# Patient Record
Sex: MALE | Race: WHITE | NOT HISPANIC OR LATINO
[De-identification: names, ages, dates, MRNs, and addresses within clinical notes are randomized per-mention and may not be internally consistent; named-entity substitution may affect disease eponyms.]

---

## 2018-04-06 ENCOUNTER — APPOINTMENT (OUTPATIENT)
Dept: DERMATOLOGY | Facility: CLINIC | Age: 83
End: 2018-04-06
Payer: MEDICARE

## 2018-04-06 ENCOUNTER — MOBILE ON CALL (OUTPATIENT)
Age: 83
End: 2018-04-06

## 2018-04-06 ENCOUNTER — LABORATORY RESULT (OUTPATIENT)
Age: 83
End: 2018-04-06

## 2018-04-06 VITALS
WEIGHT: 176 LBS | HEIGHT: 70 IN | DIASTOLIC BLOOD PRESSURE: 68 MMHG | SYSTOLIC BLOOD PRESSURE: 126 MMHG | BODY MASS INDEX: 25.2 KG/M2

## 2018-04-06 DIAGNOSIS — Z86.39 PERSONAL HISTORY OF OTHER ENDOCRINE, NUTRITIONAL AND METABOLIC DISEASE: ICD-10-CM

## 2018-04-06 DIAGNOSIS — D48.5 NEOPLASM OF UNCERTAIN BEHAVIOR OF SKIN: ICD-10-CM

## 2018-04-06 DIAGNOSIS — Z91.89 OTHER SPECIFIED PERSONAL RISK FACTORS, NOT ELSEWHERE CLASSIFIED: ICD-10-CM

## 2018-04-06 DIAGNOSIS — Z87.891 PERSONAL HISTORY OF NICOTINE DEPENDENCE: ICD-10-CM

## 2018-04-06 PROCEDURE — 99203 OFFICE O/P NEW LOW 30 MIN: CPT | Mod: 25,GC

## 2018-04-06 PROCEDURE — 11100 BX SKIN SUBCUTANEOUS&/MUCOUS MEMBRANE 1 LESION: CPT | Mod: 59,GC

## 2018-04-06 PROCEDURE — 11101 BIOPSY SKIN SUBQ&/MUCOUS MEMBRANE EA ADDL LESN: CPT | Mod: 59,GC

## 2018-04-06 RX ORDER — GLIPIZIDE 5 MG/1
5 TABLET, FILM COATED, EXTENDED RELEASE ORAL
Qty: 90 | Refills: 0 | Status: ACTIVE | COMMUNITY
Start: 2017-11-02

## 2018-04-06 RX ORDER — OSELTAMIVIR PHOSPHATE 75 MG/1
75 CAPSULE ORAL
Qty: 10 | Refills: 0 | Status: ACTIVE | COMMUNITY
Start: 2018-01-09

## 2018-04-06 RX ORDER — MIRTAZAPINE 15 MG/1
15 TABLET, FILM COATED ORAL
Qty: 90 | Refills: 0 | Status: ACTIVE | COMMUNITY
Start: 2017-08-08

## 2018-04-06 RX ORDER — ESCITALOPRAM OXALATE 10 MG/1
10 TABLET ORAL
Qty: 45 | Refills: 0 | Status: ACTIVE | COMMUNITY
Start: 2017-05-16

## 2018-04-06 RX ORDER — CLONAZEPAM 0.5 MG/1
0.5 TABLET ORAL
Qty: 30 | Refills: 0 | Status: ACTIVE | COMMUNITY
Start: 2017-12-12

## 2018-04-06 RX ORDER — METFORMIN HYDROCHLORIDE 1000 MG/1
1000 TABLET, COATED ORAL
Qty: 90 | Refills: 0 | Status: ACTIVE | COMMUNITY
Start: 2017-11-02

## 2018-04-06 RX ORDER — POLYETHYLENE GLYCOL 3350 17 G/17G
17 POWDER, FOR SOLUTION ORAL
Qty: 510 | Refills: 0 | Status: ACTIVE | COMMUNITY
Start: 2017-11-20

## 2018-04-10 ENCOUNTER — TRANSCRIPTION ENCOUNTER (OUTPATIENT)
Age: 83
End: 2018-04-10

## 2018-04-17 ENCOUNTER — APPOINTMENT (OUTPATIENT)
Dept: PLASTIC SURGERY | Facility: CLINIC | Age: 83
End: 2018-04-17
Payer: MEDICARE

## 2018-04-17 ENCOUNTER — MOBILE ON CALL (OUTPATIENT)
Age: 83
End: 2018-04-17

## 2018-04-17 ENCOUNTER — APPOINTMENT (OUTPATIENT)
Dept: DERMATOLOGY | Facility: CLINIC | Age: 83
End: 2018-04-17
Payer: MEDICARE

## 2018-04-17 ENCOUNTER — LABORATORY RESULT (OUTPATIENT)
Age: 83
End: 2018-04-17

## 2018-04-17 VITALS
HEIGHT: 70 IN | TEMPERATURE: 98.2 F | DIASTOLIC BLOOD PRESSURE: 71 MMHG | SYSTOLIC BLOOD PRESSURE: 123 MMHG | BODY MASS INDEX: 24.62 KG/M2 | HEART RATE: 89 BPM | WEIGHT: 172 LBS

## 2018-04-17 PROCEDURE — 99203 OFFICE O/P NEW LOW 30 MIN: CPT

## 2018-04-17 PROCEDURE — 17263 DSTRJ MAL LES T/A/L 2.1-3.0: CPT

## 2018-04-17 PROCEDURE — 11100 BX SKIN SUBCUTANEOUS&/MUCOUS MEMBRANE 1 LESION: CPT | Mod: 59

## 2018-04-17 PROCEDURE — 99214 OFFICE O/P EST MOD 30 MIN: CPT | Mod: 25

## 2018-04-17 RX ORDER — MUPIROCIN 20 MG/G
2 OINTMENT TOPICAL
Qty: 1 | Refills: 6 | Status: ACTIVE | COMMUNITY
Start: 2018-04-17 | End: 1900-01-01

## 2018-04-17 RX ORDER — MUPIROCIN CALCIUM 20 MG/G
2 OINTMENT TOPICAL
Qty: 1 | Refills: 2 | Status: DISCONTINUED | COMMUNITY
Start: 2018-04-17 | End: 2018-04-17

## 2018-04-30 ENCOUNTER — MOBILE ON CALL (OUTPATIENT)
Age: 83
End: 2018-04-30

## 2018-04-30 ENCOUNTER — APPOINTMENT (OUTPATIENT)
Dept: DERMATOLOGY | Facility: CLINIC | Age: 83
End: 2018-04-30
Payer: MEDICARE

## 2018-04-30 DIAGNOSIS — C44.329 SQUAMOUS CELL CARCINOMA OF SKIN OF OTHER PARTS OF FACE: ICD-10-CM

## 2018-04-30 PROCEDURE — 99213 OFFICE O/P EST LOW 20 MIN: CPT | Mod: 25

## 2018-04-30 PROCEDURE — 17311 MOHS 1 STAGE H/N/HF/G: CPT

## 2018-05-01 ENCOUNTER — OUTPATIENT (OUTPATIENT)
Dept: OUTPATIENT SERVICES | Facility: HOSPITAL | Age: 83
LOS: 1 days | End: 2018-05-01

## 2018-05-01 ENCOUNTER — APPOINTMENT (OUTPATIENT)
Dept: PLASTIC SURGERY | Facility: CLINIC | Age: 83
End: 2018-05-01
Payer: MEDICARE

## 2018-05-01 VITALS
TEMPERATURE: 99 F | DIASTOLIC BLOOD PRESSURE: 60 MMHG | WEIGHT: 173.94 LBS | SYSTOLIC BLOOD PRESSURE: 100 MMHG | HEIGHT: 68.5 IN | RESPIRATION RATE: 16 BRPM | HEART RATE: 72 BPM

## 2018-05-01 DIAGNOSIS — E11.9 TYPE 2 DIABETES MELLITUS WITHOUT COMPLICATIONS: ICD-10-CM

## 2018-05-01 DIAGNOSIS — Z85.828 PERSONAL HISTORY OF OTHER MALIGNANT NEOPLASM OF SKIN: Chronic | ICD-10-CM

## 2018-05-01 DIAGNOSIS — C44.519 BASAL CELL CARCINOMA OF SKIN OF OTHER PART OF TRUNK: ICD-10-CM

## 2018-05-01 DIAGNOSIS — C44.91 BASAL CELL CARCINOMA OF SKIN, UNSPECIFIED: ICD-10-CM

## 2018-05-01 LAB
ALBUMIN SERPL ELPH-MCNC: 3.8 G/DL — SIGNIFICANT CHANGE UP (ref 3.3–5)
ALP SERPL-CCNC: 93 U/L — SIGNIFICANT CHANGE UP (ref 40–120)
ALT FLD-CCNC: 18 U/L — SIGNIFICANT CHANGE UP (ref 4–41)
AST SERPL-CCNC: 21 U/L — SIGNIFICANT CHANGE UP (ref 4–40)
BILIRUB SERPL-MCNC: 0.3 MG/DL — SIGNIFICANT CHANGE UP (ref 0.2–1.2)
BLD GP AB SCN SERPL QL: NEGATIVE — SIGNIFICANT CHANGE UP
BUN SERPL-MCNC: 17 MG/DL — SIGNIFICANT CHANGE UP (ref 7–23)
CALCIUM SERPL-MCNC: 9.6 MG/DL — SIGNIFICANT CHANGE UP (ref 8.4–10.5)
CHLORIDE SERPL-SCNC: 98 MMOL/L — SIGNIFICANT CHANGE UP (ref 98–107)
CO2 SERPL-SCNC: 33 MMOL/L — HIGH (ref 22–31)
CREAT SERPL-MCNC: 1.39 MG/DL — HIGH (ref 0.5–1.3)
GLUCOSE SERPL-MCNC: 116 MG/DL — HIGH (ref 70–99)
HBA1C BLD-MCNC: 5.8 % — HIGH (ref 4–5.6)
HCT VFR BLD CALC: 44.1 % — SIGNIFICANT CHANGE UP (ref 39–50)
HGB BLD-MCNC: 13.7 G/DL — SIGNIFICANT CHANGE UP (ref 13–17)
MCHC RBC-ENTMCNC: 31.1 % — LOW (ref 32–36)
MCHC RBC-ENTMCNC: 32.2 PG — SIGNIFICANT CHANGE UP (ref 27–34)
MCV RBC AUTO: 103.5 FL — HIGH (ref 80–100)
NRBC # FLD: 0 — SIGNIFICANT CHANGE UP
PLATELET # BLD AUTO: 234 K/UL — SIGNIFICANT CHANGE UP (ref 150–400)
PMV BLD: 9.4 FL — SIGNIFICANT CHANGE UP (ref 7–13)
POTASSIUM SERPL-MCNC: 4.6 MMOL/L — SIGNIFICANT CHANGE UP (ref 3.5–5.3)
POTASSIUM SERPL-SCNC: 4.6 MMOL/L — SIGNIFICANT CHANGE UP (ref 3.5–5.3)
PROT SERPL-MCNC: 7.4 G/DL — SIGNIFICANT CHANGE UP (ref 6–8.3)
RBC # BLD: 4.26 M/UL — SIGNIFICANT CHANGE UP (ref 4.2–5.8)
RBC # FLD: 13 % — SIGNIFICANT CHANGE UP (ref 10.3–14.5)
RH IG SCN BLD-IMP: POSITIVE — SIGNIFICANT CHANGE UP
SODIUM SERPL-SCNC: 140 MMOL/L — SIGNIFICANT CHANGE UP (ref 135–145)
WBC # BLD: 9.82 K/UL — SIGNIFICANT CHANGE UP (ref 3.8–10.5)
WBC # FLD AUTO: 9.82 K/UL — SIGNIFICANT CHANGE UP (ref 3.8–10.5)

## 2018-05-01 PROCEDURE — 14041 TIS TRNFR F/C/C/M/N/A/G/H/F: CPT

## 2018-05-01 NOTE — H&P PST ADULT - NEGATIVE ENMT SYMPTOMS
no sinus symptoms/no tinnitus/no nose bleeds/no vertigo/no throat pain/no dysphagia/no ear pain/no hearing difficulty

## 2018-05-01 NOTE — H&P PST ADULT - SKIN COMMENTS
dry scaly skin to lower extremities, right cheek/under eye sutures c/d/i, left anterior shoulder dressing c/d/i

## 2018-05-01 NOTE — H&P PST ADULT - PROBLEM SELECTOR PLAN 2
type 2, instructed to hold metformin and Glipizide on the morning of procedure. OR booking notified of Hx of DM type 2.

## 2018-05-01 NOTE — H&P PST ADULT - RS GEN PE MLT RESP DETAILS PC
no chest wall tenderness/clear to auscultation bilaterally/good air movement/respirations non-labored/no rhonchi/airway patent/no wheezes/no rales/breath sounds equal

## 2018-05-01 NOTE — H&P PST ADULT - PROBLEM SELECTOR PLAN 1
Pt is scheduled for full thickness graft to chest for 5/7/18. Preop instructions, pepcid provided. Pt and daughter stated understanding. Penicillin allergy, OR booking notified. Pending medical evaluation due to Hx of peripheral edema, and DM type 2. Instructed patient to obtain medical evaluation. Pending echo report 2018. Instructed to take escitalopram on the morning of procedure

## 2018-05-01 NOTE — H&P PST ADULT - NSANTHOSAYNRD_GEN_A_CORE
No. VELMA screening performed.  STOP BANG Legend: 0-2 = LOW Risk; 3-4 = INTERMEDIATE Risk; 5-8 = HIGH Risk

## 2018-05-01 NOTE — H&P PST ADULT - PMH
Anxiety    Basal cell carcinoma of skin    Depression    Diabetes mellitus  type 2  Hyperlipidemia    Pancreatitis  years ago  Squamous cell carcinoma

## 2018-05-01 NOTE — H&P PST ADULT - MUSCULOSKELETAL
details… detailed exam no calf tenderness/no joint warmth/normal strength/no joint swelling/no joint erythema/ROM intact

## 2018-05-01 NOTE — H&P PST ADULT - NEGATIVE GENERAL SYMPTOMS
no polydipsia/no fever/no sweating/no anorexia/no malaise/no chills/no weight loss/no weight gain/no polyphagia/no polyuria/no fatigue

## 2018-05-01 NOTE — H&P PST ADULT - HISTORY OF PRESENT ILLNESS
85 year old male presents to presurgical testing with diagnosis of basal cell carcinoma of skin of other part of trunk scheduled for full thickness graft to chest for 5/7/18. Pt with Hx of Moh's procedure to right medial cheek on 4/30/18. Pt left anterior shoulder lesion, increasing in size s/p biopsy, requiring further surgical intervention. 85 year old male presents to presurgical testing with diagnosis of basal cell carcinoma of skin of other part of trunk scheduled for full thickness graft to chest for 5/7/18. Pt with Hx of Moh's procedure to right medial cheek on 4/30/18. Pt with left anterior shoulder lesion, increasing in size s/p biopsy, requiring further surgical intervention.

## 2018-05-01 NOTE — H&P PST ADULT - NEGATIVE OPHTHALMOLOGIC SYMPTOMS
no diplopia/no blurred vision R/no discharge R/no pain L/no loss of vision R/no discharge L/no pain R/no loss of vision L/no blurred vision L

## 2018-05-01 NOTE — H&P PST ADULT - ENDOCRINE COMMENTS
DM type 2 on medication, reports "blood work was normal", does not check FS on a daily basis. Denies thyroid dysfunction

## 2018-05-01 NOTE — H&P PST ADULT - NEGATIVE NEUROLOGICAL SYMPTOMS
no syncope/no focal seizures/no confusion/no loss of consciousness/no hemiparesis/no headache/no transient paralysis/no paresthesias/no weakness/no generalized seizures/no loss of sensation

## 2018-05-07 ENCOUNTER — APPOINTMENT (OUTPATIENT)
Dept: PLASTIC SURGERY | Facility: CLINIC | Age: 83
End: 2018-05-07
Payer: MEDICARE

## 2018-05-07 ENCOUNTER — APPOINTMENT (OUTPATIENT)
Dept: DERMATOLOGY | Facility: CLINIC | Age: 83
End: 2018-05-07
Payer: MEDICARE

## 2018-05-07 ENCOUNTER — MOBILE ON CALL (OUTPATIENT)
Age: 83
End: 2018-05-07

## 2018-05-07 PROCEDURE — 17314 MOHS ADDL STAGE T/A/L: CPT

## 2018-05-07 PROCEDURE — 17313 MOHS 1 STAGE T/A/L: CPT

## 2018-05-07 PROCEDURE — 17315 MOHS SURG ADDL BLOCK: CPT

## 2018-05-07 PROCEDURE — 13101 CMPLX RPR TRUNK 2.6-7.5 CM: CPT | Mod: 79

## 2018-05-14 ENCOUNTER — APPOINTMENT (OUTPATIENT)
Dept: DERMATOLOGY | Facility: CLINIC | Age: 83
End: 2018-05-14

## 2018-05-15 ENCOUNTER — APPOINTMENT (OUTPATIENT)
Dept: PLASTIC SURGERY | Facility: CLINIC | Age: 83
End: 2018-05-15
Payer: MEDICARE

## 2018-05-15 PROCEDURE — 99024 POSTOP FOLLOW-UP VISIT: CPT

## 2018-05-29 ENCOUNTER — APPOINTMENT (OUTPATIENT)
Dept: PLASTIC SURGERY | Facility: CLINIC | Age: 83
End: 2018-05-29
Payer: MEDICARE

## 2018-05-29 PROCEDURE — 99024 POSTOP FOLLOW-UP VISIT: CPT

## 2018-06-19 ENCOUNTER — APPOINTMENT (OUTPATIENT)
Dept: PLASTIC SURGERY | Facility: CLINIC | Age: 83
End: 2018-06-19
Payer: MEDICARE

## 2018-06-19 PROCEDURE — 99024 POSTOP FOLLOW-UP VISIT: CPT

## 2018-07-24 ENCOUNTER — APPOINTMENT (OUTPATIENT)
Dept: PLASTIC SURGERY | Facility: CLINIC | Age: 83
End: 2018-07-24
Payer: MEDICARE

## 2018-07-24 ENCOUNTER — NON-APPOINTMENT (OUTPATIENT)
Age: 83
End: 2018-07-24

## 2018-07-24 ENCOUNTER — APPOINTMENT (OUTPATIENT)
Dept: DERMATOLOGY | Facility: CLINIC | Age: 83
End: 2018-07-24
Payer: MEDICARE

## 2018-07-24 DIAGNOSIS — C44.91 BASAL CELL CARCINOMA OF SKIN, UNSPECIFIED: ICD-10-CM

## 2018-07-24 DIAGNOSIS — C44.519 BASAL CELL CARCINOMA OF SKIN OF OTHER PART OF TRUNK: ICD-10-CM

## 2018-07-24 PROCEDURE — 17311 MOHS 1 STAGE H/N/HF/G: CPT

## 2018-07-24 PROCEDURE — 99024 POSTOP FOLLOW-UP VISIT: CPT

## 2018-07-24 PROCEDURE — 13132 CMPLX RPR F/C/C/M/N/AX/G/H/F: CPT

## 2018-11-21 ENCOUNTER — APPOINTMENT (OUTPATIENT)
Dept: CT IMAGING | Facility: CLINIC | Age: 83
End: 2018-11-21
Payer: MEDICARE

## 2018-11-21 ENCOUNTER — OUTPATIENT (OUTPATIENT)
Dept: OUTPATIENT SERVICES | Facility: HOSPITAL | Age: 83
LOS: 1 days | End: 2018-11-21
Payer: MEDICARE

## 2018-11-21 ENCOUNTER — TRANSCRIPTION ENCOUNTER (OUTPATIENT)
Age: 83
End: 2018-11-21

## 2018-11-21 DIAGNOSIS — Z85.828 PERSONAL HISTORY OF OTHER MALIGNANT NEOPLASM OF SKIN: Chronic | ICD-10-CM

## 2018-11-21 DIAGNOSIS — S09.90XA UNSPECIFIED INJURY OF HEAD, INITIAL ENCOUNTER: ICD-10-CM

## 2018-11-21 PROBLEM — C44.92 SQUAMOUS CELL CARCINOMA OF SKIN, UNSPECIFIED: Chronic | Status: ACTIVE | Noted: 2018-05-01

## 2018-11-21 PROBLEM — C44.91 BASAL CELL CARCINOMA OF SKIN, UNSPECIFIED: Chronic | Status: ACTIVE | Noted: 2018-05-01

## 2018-11-21 PROCEDURE — 70450 CT HEAD/BRAIN W/O DYE: CPT | Mod: 26

## 2018-11-21 PROCEDURE — 70450 CT HEAD/BRAIN W/O DYE: CPT

## 2018-12-05 ENCOUNTER — APPOINTMENT (OUTPATIENT)
Dept: OPHTHALMOLOGY | Facility: CLINIC | Age: 83
End: 2018-12-05
Payer: MEDICARE

## 2018-12-05 DIAGNOSIS — E11.3293 TYPE 2 DIABETES MELLITUS WITH MILD NONPROLIFERATIVE DIABETIC RETINOPATHY WITHOUT MACULAR EDEMA, BILATERAL: ICD-10-CM

## 2018-12-05 DIAGNOSIS — H40.003 PREGLAUCOMA, UNSPECIFIED, BILATERAL: ICD-10-CM

## 2018-12-05 PROCEDURE — 92133 CPTRZD OPH DX IMG PST SGM ON: CPT

## 2018-12-05 PROCEDURE — 92225: CPT | Mod: 50

## 2018-12-05 PROCEDURE — 92004 COMPRE OPH EXAM NEW PT 1/>: CPT

## 2018-12-05 PROCEDURE — 76514 ECHO EXAM OF EYE THICKNESS: CPT

## 2019-01-23 ENCOUNTER — EMERGENCY (EMERGENCY)
Facility: HOSPITAL | Age: 84
LOS: 1 days | Discharge: HOME | End: 2019-01-23
Attending: EMERGENCY MEDICINE
Payer: MEDICARE

## 2019-01-23 VITALS
RESPIRATION RATE: 19 BRPM | DIASTOLIC BLOOD PRESSURE: 77 MMHG | TEMPERATURE: 98 F | HEIGHT: 68 IN | HEART RATE: 98 BPM | OXYGEN SATURATION: 95 % | SYSTOLIC BLOOD PRESSURE: 116 MMHG | WEIGHT: 171.96 LBS

## 2019-01-23 VITALS
DIASTOLIC BLOOD PRESSURE: 74 MMHG | RESPIRATION RATE: 18 BRPM | HEART RATE: 88 BPM | SYSTOLIC BLOOD PRESSURE: 149 MMHG | TEMPERATURE: 98 F | OXYGEN SATURATION: 95 %

## 2019-01-23 DIAGNOSIS — Z85.828 PERSONAL HISTORY OF OTHER MALIGNANT NEOPLASM OF SKIN: Chronic | ICD-10-CM

## 2019-01-23 LAB
ALBUMIN SERPL ELPH-MCNC: 3.8 G/DL — SIGNIFICANT CHANGE UP (ref 3.3–5)
ALP SERPL-CCNC: 95 U/L — SIGNIFICANT CHANGE UP (ref 40–120)
ALT FLD-CCNC: 19 U/L — SIGNIFICANT CHANGE UP (ref 10–45)
ANION GAP SERPL CALC-SCNC: 11 MMOL/L — SIGNIFICANT CHANGE UP (ref 5–17)
APTT BLD: 32.1 SEC — SIGNIFICANT CHANGE UP (ref 27.5–36.3)
AST SERPL-CCNC: 20 U/L — SIGNIFICANT CHANGE UP (ref 10–40)
BASOPHILS # BLD AUTO: 0 K/UL — SIGNIFICANT CHANGE UP (ref 0–0.2)
BASOPHILS NFR BLD AUTO: 0.5 % — SIGNIFICANT CHANGE UP (ref 0–2)
BILIRUB SERPL-MCNC: 0.3 MG/DL — SIGNIFICANT CHANGE UP (ref 0.2–1.2)
BUN SERPL-MCNC: 19 MG/DL — SIGNIFICANT CHANGE UP (ref 7–23)
CALCIUM SERPL-MCNC: 9.6 MG/DL — SIGNIFICANT CHANGE UP (ref 8.4–10.5)
CHLORIDE SERPL-SCNC: 99 MMOL/L — SIGNIFICANT CHANGE UP (ref 96–108)
CO2 SERPL-SCNC: 32 MMOL/L — HIGH (ref 22–31)
CREAT SERPL-MCNC: 1.41 MG/DL — HIGH (ref 0.5–1.3)
EOSINOPHIL # BLD AUTO: 0.1 K/UL — SIGNIFICANT CHANGE UP (ref 0–0.5)
EOSINOPHIL NFR BLD AUTO: 1.9 % — SIGNIFICANT CHANGE UP (ref 0–6)
GAS PNL BLDV: SIGNIFICANT CHANGE UP
GLUCOSE SERPL-MCNC: 143 MG/DL — HIGH (ref 70–99)
HCT VFR BLD CALC: 41.6 % — SIGNIFICANT CHANGE UP (ref 39–50)
HGB BLD-MCNC: 13.4 G/DL — SIGNIFICANT CHANGE UP (ref 13–17)
INR BLD: 1.18 RATIO — HIGH (ref 0.88–1.16)
LYMPHOCYTES # BLD AUTO: 1.4 K/UL — SIGNIFICANT CHANGE UP (ref 1–3.3)
LYMPHOCYTES # BLD AUTO: 17.1 % — SIGNIFICANT CHANGE UP (ref 13–44)
MCHC RBC-ENTMCNC: 32.2 GM/DL — SIGNIFICANT CHANGE UP (ref 32–36)
MCHC RBC-ENTMCNC: 32.4 PG — SIGNIFICANT CHANGE UP (ref 27–34)
MCV RBC AUTO: 101 FL — HIGH (ref 80–100)
MONOCYTES # BLD AUTO: 0.6 K/UL — SIGNIFICANT CHANGE UP (ref 0–0.9)
MONOCYTES NFR BLD AUTO: 7.6 % — SIGNIFICANT CHANGE UP (ref 2–14)
NEUTROPHILS # BLD AUTO: 5.8 K/UL — SIGNIFICANT CHANGE UP (ref 1.8–7.4)
NEUTROPHILS NFR BLD AUTO: 72.9 % — SIGNIFICANT CHANGE UP (ref 43–77)
PLATELET # BLD AUTO: 210 K/UL — SIGNIFICANT CHANGE UP (ref 150–400)
POTASSIUM SERPL-MCNC: 4.8 MMOL/L — SIGNIFICANT CHANGE UP (ref 3.5–5.3)
POTASSIUM SERPL-SCNC: 4.8 MMOL/L — SIGNIFICANT CHANGE UP (ref 3.5–5.3)
PROT SERPL-MCNC: 7.2 G/DL — SIGNIFICANT CHANGE UP (ref 6–8.3)
PROTHROM AB SERPL-ACNC: 13.5 SEC — HIGH (ref 10–12.9)
RBC # BLD: 4.13 M/UL — LOW (ref 4.2–5.8)
RBC # FLD: 13 % — SIGNIFICANT CHANGE UP (ref 10.3–14.5)
SODIUM SERPL-SCNC: 142 MMOL/L — SIGNIFICANT CHANGE UP (ref 135–145)
TROPONIN T, HIGH SENSITIVITY RESULT: 48 NG/L — SIGNIFICANT CHANGE UP (ref 0–51)
TROPONIN T, HIGH SENSITIVITY RESULT: 49 NG/L — SIGNIFICANT CHANGE UP (ref 0–51)
WBC # BLD: 7.9 K/UL — SIGNIFICANT CHANGE UP (ref 3.8–10.5)
WBC # FLD AUTO: 7.9 K/UL — SIGNIFICANT CHANGE UP (ref 3.8–10.5)

## 2019-01-23 PROCEDURE — 72125 CT NECK SPINE W/O DYE: CPT | Mod: 26

## 2019-01-23 PROCEDURE — 72100 X-RAY EXAM L-S SPINE 2/3 VWS: CPT

## 2019-01-23 PROCEDURE — 73130 X-RAY EXAM OF HAND: CPT

## 2019-01-23 PROCEDURE — 70450 CT HEAD/BRAIN W/O DYE: CPT | Mod: 26

## 2019-01-23 PROCEDURE — 83605 ASSAY OF LACTIC ACID: CPT

## 2019-01-23 PROCEDURE — 99284 EMERGENCY DEPT VISIT MOD MDM: CPT | Mod: 25

## 2019-01-23 PROCEDURE — 82435 ASSAY OF BLOOD CHLORIDE: CPT

## 2019-01-23 PROCEDURE — 73110 X-RAY EXAM OF WRIST: CPT

## 2019-01-23 PROCEDURE — 73130 X-RAY EXAM OF HAND: CPT | Mod: 26,LT

## 2019-01-23 PROCEDURE — 82550 ASSAY OF CK (CPK): CPT

## 2019-01-23 PROCEDURE — 99285 EMERGENCY DEPT VISIT HI MDM: CPT | Mod: GC,25

## 2019-01-23 PROCEDURE — 96360 HYDRATION IV INFUSION INIT: CPT

## 2019-01-23 PROCEDURE — 82803 BLOOD GASES ANY COMBINATION: CPT

## 2019-01-23 PROCEDURE — 85014 HEMATOCRIT: CPT

## 2019-01-23 PROCEDURE — 90715 TDAP VACCINE 7 YRS/> IM: CPT

## 2019-01-23 PROCEDURE — 93005 ELECTROCARDIOGRAM TRACING: CPT

## 2019-01-23 PROCEDURE — 82962 GLUCOSE BLOOD TEST: CPT

## 2019-01-23 PROCEDURE — 93010 ELECTROCARDIOGRAM REPORT: CPT | Mod: GC

## 2019-01-23 PROCEDURE — 85027 COMPLETE CBC AUTOMATED: CPT

## 2019-01-23 PROCEDURE — 82947 ASSAY GLUCOSE BLOOD QUANT: CPT

## 2019-01-23 PROCEDURE — 80053 COMPREHEN METABOLIC PANEL: CPT

## 2019-01-23 PROCEDURE — 84484 ASSAY OF TROPONIN QUANT: CPT

## 2019-01-23 PROCEDURE — 72100 X-RAY EXAM L-S SPINE 2/3 VWS: CPT | Mod: 26

## 2019-01-23 PROCEDURE — 83880 ASSAY OF NATRIURETIC PEPTIDE: CPT

## 2019-01-23 PROCEDURE — 72125 CT NECK SPINE W/O DYE: CPT

## 2019-01-23 PROCEDURE — 90471 IMMUNIZATION ADMIN: CPT

## 2019-01-23 PROCEDURE — 85610 PROTHROMBIN TIME: CPT

## 2019-01-23 PROCEDURE — 71045 X-RAY EXAM CHEST 1 VIEW: CPT | Mod: 26

## 2019-01-23 PROCEDURE — 84132 ASSAY OF SERUM POTASSIUM: CPT

## 2019-01-23 PROCEDURE — 71045 X-RAY EXAM CHEST 1 VIEW: CPT

## 2019-01-23 PROCEDURE — 84295 ASSAY OF SERUM SODIUM: CPT

## 2019-01-23 PROCEDURE — 82330 ASSAY OF CALCIUM: CPT

## 2019-01-23 PROCEDURE — 70450 CT HEAD/BRAIN W/O DYE: CPT

## 2019-01-23 PROCEDURE — 85730 THROMBOPLASTIN TIME PARTIAL: CPT

## 2019-01-23 PROCEDURE — 73110 X-RAY EXAM OF WRIST: CPT | Mod: 26,LT

## 2019-01-23 RX ORDER — SODIUM CHLORIDE 9 MG/ML
500 INJECTION INTRAMUSCULAR; INTRAVENOUS; SUBCUTANEOUS ONCE
Qty: 0 | Refills: 0 | Status: COMPLETED | OUTPATIENT
Start: 2019-01-23 | End: 2019-01-23

## 2019-01-23 RX ORDER — TETANUS TOXOID, REDUCED DIPHTHERIA TOXOID AND ACELLULAR PERTUSSIS VACCINE, ADSORBED 5; 2.5; 8; 8; 2.5 [IU]/.5ML; [IU]/.5ML; UG/.5ML; UG/.5ML; UG/.5ML
0.5 SUSPENSION INTRAMUSCULAR ONCE
Qty: 0 | Refills: 0 | Status: COMPLETED | OUTPATIENT
Start: 2019-01-23 | End: 2019-01-23

## 2019-01-23 RX ADMIN — SODIUM CHLORIDE 500 MILLILITER(S): 9 INJECTION INTRAMUSCULAR; INTRAVENOUS; SUBCUTANEOUS at 11:00

## 2019-01-23 RX ADMIN — SODIUM CHLORIDE 500 MILLILITER(S): 9 INJECTION INTRAMUSCULAR; INTRAVENOUS; SUBCUTANEOUS at 09:51

## 2019-01-23 RX ADMIN — TETANUS TOXOID, REDUCED DIPHTHERIA TOXOID AND ACELLULAR PERTUSSIS VACCINE, ADSORBED 0.5 MILLILITER(S): 5; 2.5; 8; 8; 2.5 SUSPENSION INTRAMUSCULAR at 09:52

## 2019-01-23 NOTE — ED ADULT NURSE NOTE - OBJECTIVE STATEMENT
0824 86 yr old WM brought to ER via ambulance on stretcher from assisted living facility for further eval and tx of near syncope, fall and weakness at 630 AM. Was in room getting dressed, getting ready for breakfast. Felt weak, fell, striking head and scraping left hand near index finger.Abrasion to right forehead and temporal region. Pressed life alert bracelet. Denies LOC, chest pain, palp or dizziness. Lips and tongue look dry A&Ox4. Color pink. skin W&D. Crackles 1/3 up on right. Pedal edema on right 3+, 1+ on left.  PMHx of diabetes, depression and anxiety

## 2019-01-23 NOTE — ED ADULT NURSE NOTE - NSIMPLEMENTINTERV_GEN_ALL_ED
Implemented All Fall with Harm Risk Interventions:  [place] to call system. Call bell, personal items and telephone within reach. Instruct patient to call for assistance. Room bathroom lighting operational. Non-slip footwear when patient is off stretcher. Physically safe environment: no spills, clutter or unnecessary equipment. Stretcher in lowest position, wheels locked, appropriate side rails in place. Provide visual cue, wrist band, yellow gown, etc. Monitor gait and stability. Monitor for mental status changes and reorient to person, place, and time. Review medications for side effects contributing to fall risk. Reinforce activity limits and safety measures with patient and family. Provide visual clues: red socks.

## 2019-01-23 NOTE — ED ADULT TRIAGE NOTE - CHIEF COMPLAINT QUOTE
fall/ near syncope- pt states that he felt dizzy and fell- pt denies LOC- no anti coags   +abrasion on right side of forehead

## 2019-01-23 NOTE — ED PROVIDER NOTE - PSH
H/O Moh's micrographic surgery for skin cancer  4/30/18 right cheek  Inguinal hernia s/p repair (L)    Renal cyst surgery in 2008

## 2019-01-23 NOTE — ED PROVIDER NOTE - NSFOLLOWUPINSTRUCTIONS_ED_ALL_ED_FT
Please followup with Dr. Daniel tomorrow in the [facility].  Return to ED with any worsening sign or symptoms or any concerns.

## 2019-01-23 NOTE — ED PROVIDER NOTE - PROGRESS NOTE DETAILS
All labs reviewed and discussed with patient and son.  Patient able to stand without difficulty and walk.  Patient will followup with Dr. Daniel tomorrow at the [facility].

## 2019-01-23 NOTE — ED PROVIDER NOTE - CARE PLAN
Principal Discharge DX:	Fall, initial encounter Principal Discharge DX:	Contusion of forehead, initial encounter  Secondary Diagnosis:	Fall, initial encounter

## 2019-01-23 NOTE — ED PROVIDER NOTE - OBJECTIVE STATEMENT
86M PMH NIKOLE SR presents from [facility] with syncope.  Patient reports he woke up at 6:30am and reports he felt dizziness.  Patient reports he fell down and hit his head against the door and rug.  Patient states he does not believe he passed out but does not recall.  Patient states he felt too weak to stand up and activated his life alert.  EMS arrived 20-30 minutes later 86M PMH NIKOLE SR presents from [facility] with dizziness and fall.  Patient reports he woke up at 6:30am and reports he felt dizziness.  Patient describes dizziness as lightheadedness and states he felt like he was going to pass out.  Patient reports he fell down and hit his head against the door and rug.  Patient states he does not believe he passed out but does not recall.  Patient states he felt too weak to stand up and activated his life alert.  EMS arrived 20-30 minutes later per patient.  Patient reports he fell forward but reports mild low back pain and left hand pain.  Patient denies chest pain, dyspnea, nausea, vomiting, changes in vision, numbness, paresthesias. 86M PMH NIKOLE SR presents from [facility] with dizziness and fall.  Patient reports he woke up at 6:30am and reports he felt dizziness.  Patient describes dizziness as lightheadedness and states he felt like he was going to pass out.  Patient reports he fell down and hit his head against the door and rug.  Patient states he does not believe he passed out but does not recall.  Patient states he felt too weak to stand up.  Patient reports he fell forward but reports mild low back pain and left hand pain.  Patient denies chest pain, dyspnea, nausea, vomiting, changes in vision, numbness, paresthesias. 86M PMH NIKOLE SR presents from [facility] with dizziness and fall.  Patient reports he woke up at 6:30am and reports he felt dizziness.  Patient describes dizziness as lightheadedness and states he felt like he was going to pass out.  Patient reports he fell down and hit his head against the door and rug.  Patient states he does not believe he passed out but does not recall.  Patient states he felt too weak to stand up.  Patient reports he fell forward but reports mild low back pain and left hand pain.  Patient denies chest pain, dyspnea, nausea, vomiting, changes in vision, numbness, paresthesias.    Rdz; Dizzy this morning and had fall against wall.  minor contusion to hand and forehead.

## 2019-01-23 NOTE — ED PROVIDER NOTE - PHYSICAL EXAMINATION
Rdz:  General: No distress.  Mentation at baseline. abrasion to forhead.  Exam with son at bedside  HEENT: WNL  Chest/Lungs: CTAB, No wheeze, No retractions, No increased work of breathing, Normal rate  Heart: S1S2 RRR, No M/R/G, Pules equal Bilaterally in upper and lower extremities distally  Abd: soft, NT/ND, No guarding, No rebound.  No hernias, no palpable masses.  Extrem: FROM in all joints, no significant edema noted, No ulcers.  Cap refil < 2sec. minor abrasion to hand   Skin: No rash noted, warm dry.  Neuro:  Grossly normal.  No difficulty ambulating. No focal deficits.  Psychiatric: No evidence of delusions. No SI/HI.

## 2019-02-12 ENCOUNTER — APPOINTMENT (OUTPATIENT)
Dept: OPHTHALMOLOGY | Facility: CLINIC | Age: 84
End: 2019-02-12

## 2019-05-09 ENCOUNTER — APPOINTMENT (OUTPATIENT)
Dept: UROLOGY | Facility: CLINIC | Age: 84
End: 2019-05-09
Payer: MEDICARE

## 2019-05-09 DIAGNOSIS — R35.0 FREQUENCY OF MICTURITION: ICD-10-CM

## 2019-05-09 DIAGNOSIS — N48.1 BALANITIS: ICD-10-CM

## 2019-05-09 DIAGNOSIS — K46.9 UNSPECIFIED ABDOMINAL HERNIA W/OUT OBSTRUCTION OR GANGRENE: ICD-10-CM

## 2019-05-09 DIAGNOSIS — N47.8 OTHER DISORDERS OF PREPUCE: ICD-10-CM

## 2019-05-09 PROCEDURE — 99203 OFFICE O/P NEW LOW 30 MIN: CPT | Mod: 25

## 2019-05-09 PROCEDURE — 51798 US URINE CAPACITY MEASURE: CPT | Mod: 59

## 2019-05-09 PROCEDURE — 51741 ELECTRO-UROFLOWMETRY FIRST: CPT

## 2019-05-09 RX ORDER — HYDROCORTISONE 25 MG/G
2.5 CREAM TOPICAL TWICE DAILY
Qty: 15 | Refills: 0 | Status: ACTIVE | COMMUNITY
Start: 2019-05-09 | End: 1900-01-01

## 2019-05-09 RX ORDER — NYSTATIN 100000 [USP'U]/G
100000 CREAM TOPICAL TWICE DAILY
Qty: 2 | Refills: 0 | Status: ACTIVE | COMMUNITY
Start: 2019-05-09 | End: 1900-01-01

## 2019-05-09 RX ORDER — NYSTATIN AND TRIAMCINOLONE ACETONIDE 100000; 1 [USP'U]/G; MG/G
100000-0.1 OINTMENT TOPICAL TWICE DAILY
Qty: 1 | Refills: 0 | Status: ACTIVE | COMMUNITY
Start: 2019-05-09 | End: 1900-01-01

## 2019-05-09 NOTE — REVIEW OF SYSTEMS
[Diarrhea] : diarrhea [Constipation] : constipation [Wake up at night to urinate  How many times?  ___] : wakes up to urinate [unfilled] times during the night [Strong urge to urinate] : strong urge to urinate [Skin Lesions] : skin lesion [Negative] : Heme/Lymph

## 2019-05-09 NOTE — REVIEW OF SYSTEMS
[Diarrhea] : diarrhea [Constipation] : constipation [Wake up at night to urinate  How many times?  ___] : wakes up to urinate [unfilled] times during the night [Strong urge to urinate] : strong urge to urinate [Skin Lesions] : skin lesion [Negative] : Endocrine

## 2019-05-10 LAB
APPEARANCE: CLEAR
BACTERIA: NEGATIVE
BILIRUBIN URINE: NEGATIVE
BLOOD URINE: NEGATIVE
COLOR: YELLOW
GLUCOSE QUALITATIVE U: NEGATIVE
HYALINE CASTS: 1 /LPF
KETONES URINE: NEGATIVE
LEUKOCYTE ESTERASE URINE: NEGATIVE
MICROSCOPIC-UA: NORMAL
NITRITE URINE: NEGATIVE
PH URINE: 6
PROTEIN URINE: ABNORMAL
RED BLOOD CELLS URINE: 9 /HPF
SPECIFIC GRAVITY URINE: 1.03
SQUAMOUS EPITHELIAL CELLS: 1 /HPF
UROBILINOGEN URINE: NORMAL
WHITE BLOOD CELLS URINE: 1 /HPF

## 2019-05-10 NOTE — PHYSICAL EXAM
[General Appearance - Well Developed] : well developed [General Appearance - Well Nourished] : well nourished [Heart Rate And Rhythm] : Heart rate and rhythm were normal [Exaggerated Use Of Accessory Muscles For Inspiration] : no accessory muscle use [Bowel Sounds] : normal bowel sounds [Abdomen Soft] : soft [Abdomen Tenderness] : non-tender [] : no hepato-splenomegaly [Abdomen Mass (___ Cm)] : no abdominal mass palpated [Abdomen Hernia] : no hernia was discovered [Urethral Meatus] : meatus normal [Penis Abnormality] : normal circumcised penis [Epididymis] : the epididymides were normal [Normal Station and Gait] : the gait and station were normal for the patient's age [Oriented To Time, Place, And Person] : oriented to person, place, and time [Inguinal Lymph Nodes Enlarged Bilaterally] : inguinal [Skin Turgor] : supple [No Focal Deficits] : no focal deficits [FreeTextEntry1] : redundant foreskin; balanoposthitis; PVR 17

## 2019-05-10 NOTE — LETTER BODY
[Dear  ___] : Dear  [unfilled], [Consult Letter:] : I had the pleasure of evaluating your patient, [unfilled]. [Please see my note below.] : Please see my note below. [Sincerely,] : Sincerely, [Consult Closing:] : Thank you very much for allowing me to participate in the care of this patient.  If you have any questions, please do not hesitate to contact me. [FreeTextEntry1] : \par \par

## 2019-05-10 NOTE — LETTER BODY
[Dear  ___] : Dear  [unfilled], [Consult Closing:] : Thank you very much for allowing me to participate in the care of this patient.  If you have any questions, please do not hesitate to contact me. [Please see my note below.] : Please see my note below. [Sincerely,] : Sincerely, [Consult Letter:] : I had the pleasure of evaluating your patient, [unfilled]. [FreeTextEntry1] : \par \par

## 2019-05-10 NOTE — HISTORY OF PRESENT ILLNESS
[Nocturia] : nocturia [Weak Stream] : weak stream [FreeTextEntry1] : 86 year old complaining  nocturia x 3\par no Diuril frequency\par weak stream\par lives is assisted living\par accompanied by daughter [Urinary Incontinence] : no urinary incontinence [Urinary Retention] : no urinary retention [Urinary Urgency] : no urinary urgency [Urinary Frequency] : no urinary frequency [Intermittency] : no intermittency

## 2019-05-10 NOTE — ASSESSMENT
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 nocturia and\par 2. pyuria\par Urinary symptoms are only nocturnal.  Patient denies Diuril symptoms.  He voided volume 45\par peak flow 3.1 with a PVR of  19.  He has significant pedal edema and in light of the fact that his symptoms are only nocturia with low PVR, would recommend medical management.  Discussed potential sodium restriction and/or Diuril diuretic.  Patient will review with PCP\par \par leukocyte esterase moderate observed on single urinalysis\par He has redundant foreskin with balanoposthitis\par Will treat with nystatin and hydrocortisone\par Discussed hygiene measures\par Will repeat urinalysis and culture after cleaning glans\par \par Will assess in 3 weeks

## 2019-05-13 LAB — BACTERIA UR CULT: NORMAL

## 2019-05-24 ENCOUNTER — APPOINTMENT (OUTPATIENT)
Dept: DERMATOLOGY | Facility: CLINIC | Age: 84
End: 2019-05-24
Payer: MEDICARE

## 2019-05-24 ENCOUNTER — LABORATORY RESULT (OUTPATIENT)
Age: 84
End: 2019-05-24

## 2019-05-24 DIAGNOSIS — L98.8 OTHER SPECIFIED DISORDERS OF THE SKIN AND SUBCUTANEOUS TISSUE: ICD-10-CM

## 2019-05-24 DIAGNOSIS — D48.5 NEOPLASM OF UNCERTAIN BEHAVIOR OF SKIN: ICD-10-CM

## 2019-05-24 DIAGNOSIS — L57.0 ACTINIC KERATOSIS: ICD-10-CM

## 2019-05-24 DIAGNOSIS — Z12.83 ENCOUNTER FOR SCREENING FOR MALIGNANT NEOPLASM OF SKIN: ICD-10-CM

## 2019-05-24 PROCEDURE — 11103 TANGNTL BX SKIN EA SEP/ADDL: CPT | Mod: 59,GC

## 2019-05-24 PROCEDURE — 17003 DESTRUCT PREMALG LES 2-14: CPT | Mod: 59,GC

## 2019-05-24 PROCEDURE — 11102 TANGNTL BX SKIN SINGLE LES: CPT | Mod: GC

## 2019-05-24 PROCEDURE — 17000 DESTRUCT PREMALG LESION: CPT | Mod: 59,GC

## 2019-05-24 PROCEDURE — 99214 OFFICE O/P EST MOD 30 MIN: CPT | Mod: 25,GC

## 2019-05-24 RX ORDER — AMMONIUM LACTATE 12 %
12 CREAM (GRAM) TOPICAL TWICE DAILY
Qty: 1 | Refills: 1 | Status: ACTIVE | COMMUNITY
Start: 2019-05-24 | End: 1900-01-01

## 2019-05-30 ENCOUNTER — APPOINTMENT (OUTPATIENT)
Dept: UROLOGY | Facility: CLINIC | Age: 84
End: 2019-05-30
Payer: MEDICARE

## 2019-05-30 DIAGNOSIS — N39.0 URINARY TRACT INFECTION, SITE NOT SPECIFIED: ICD-10-CM

## 2019-05-30 DIAGNOSIS — B37.49 OTHER UROGENITAL CANDIDIASIS: ICD-10-CM

## 2019-05-30 DIAGNOSIS — R35.1 NOCTURIA: ICD-10-CM

## 2019-05-30 DIAGNOSIS — R60.0 LOCALIZED EDEMA: ICD-10-CM

## 2019-05-30 PROCEDURE — 99213 OFFICE O/P EST LOW 20 MIN: CPT

## 2019-05-31 PROBLEM — B37.49 CANDIDAL BALANO-POSTHITIS: Status: ACTIVE | Noted: 2019-05-09

## 2019-05-31 PROBLEM — R35.1 NOCTURIA MORE THAN TWICE PER NIGHT: Status: ACTIVE | Noted: 2019-05-09

## 2019-05-31 PROBLEM — R60.0 EDEMA, LEG: Status: ACTIVE | Noted: 2018-04-06

## 2019-05-31 NOTE — ASSESSMENT
[FreeTextEntry1] : Patient continues to have nocturia but much improved after institution of diuretic.\par He is pleased \par Discussed edema and need for further followup with PCP\par Penile hygiene much improved; no penile lesions\par

## 2019-05-31 NOTE — HISTORY OF PRESENT ILLNESS
[FreeTextEntry1] : 86 year old complaining nocturia x 3\par no Diuril frequency\par weak stream\par lives is assisted living\par accompanied by daughter \par \par Patient is currently experiencing nocturia and weak stream, but no urinary incontinence, no urinary retention, no urinary urgency, no urinary frequency and no intermittency. \par \par 5..31.2019\par patient returns \par he has been put on diuretic and notes that nocturia had decreased by 1/2\par no incontinence\par no dysuria\par

## 2019-06-04 ENCOUNTER — TRANSCRIPTION ENCOUNTER (OUTPATIENT)
Age: 84
End: 2019-06-04

## 2019-06-04 DIAGNOSIS — R31.29 OTHER MICROSCOPIC HEMATURIA: ICD-10-CM

## 2019-06-04 LAB
APPEARANCE: CLEAR
BACTERIA: NEGATIVE
BILIRUBIN URINE: NEGATIVE
BLOOD URINE: NEGATIVE
COLOR: YELLOW
GLUCOSE QUALITATIVE U: NEGATIVE
HYALINE CASTS: 0 /LPF
KETONES URINE: NEGATIVE
LEUKOCYTE ESTERASE URINE: NEGATIVE
MICROSCOPIC-UA: NORMAL
NITRITE URINE: NEGATIVE
PH URINE: 6
PROTEIN URINE: ABNORMAL
RED BLOOD CELLS URINE: 8 /HPF
SPECIFIC GRAVITY URINE: 1.02
SQUAMOUS EPITHELIAL CELLS: 0 /HPF
UROBILINOGEN URINE: NORMAL
WHITE BLOOD CELLS URINE: 1 /HPF

## 2019-06-06 ENCOUNTER — FORM ENCOUNTER (OUTPATIENT)
Age: 84
End: 2019-06-06

## 2019-06-07 ENCOUNTER — APPOINTMENT (OUTPATIENT)
Dept: CT IMAGING | Facility: CLINIC | Age: 84
End: 2019-06-07
Payer: MEDICARE

## 2019-06-07 ENCOUNTER — OUTPATIENT (OUTPATIENT)
Dept: OUTPATIENT SERVICES | Facility: HOSPITAL | Age: 84
LOS: 1 days | End: 2019-06-07
Payer: MEDICARE

## 2019-06-07 DIAGNOSIS — Z85.828 PERSONAL HISTORY OF OTHER MALIGNANT NEOPLASM OF SKIN: Chronic | ICD-10-CM

## 2019-06-07 DIAGNOSIS — R31.29 OTHER MICROSCOPIC HEMATURIA: ICD-10-CM

## 2019-06-07 PROCEDURE — 82565 ASSAY OF CREATININE: CPT

## 2019-06-07 PROCEDURE — 74178 CT ABD&PLV WO CNTR FLWD CNTR: CPT

## 2019-06-07 PROCEDURE — 74178 CT ABD&PLV WO CNTR FLWD CNTR: CPT | Mod: 26

## 2019-06-12 ENCOUNTER — OTHER (OUTPATIENT)
Age: 84
End: 2019-06-12

## 2019-06-13 ENCOUNTER — APPOINTMENT (OUTPATIENT)
Dept: UROLOGY | Facility: CLINIC | Age: 84
End: 2019-06-13
Payer: MEDICARE

## 2019-06-13 VITALS
SYSTOLIC BLOOD PRESSURE: 123 MMHG | OXYGEN SATURATION: 77 % | TEMPERATURE: 98.3 F | HEART RATE: 95 BPM | DIASTOLIC BLOOD PRESSURE: 75 MMHG

## 2019-06-13 PROCEDURE — 52000 CYSTOURETHROSCOPY: CPT

## 2019-06-17 ENCOUNTER — APPOINTMENT (OUTPATIENT)
Dept: DERMATOLOGY | Facility: CLINIC | Age: 84
End: 2019-06-17
Payer: MEDICARE

## 2019-06-17 PROCEDURE — 17282 DSTR MAL LS F/E/E/N/L/M1.1-2: CPT

## 2019-06-28 ENCOUNTER — APPOINTMENT (OUTPATIENT)
Dept: DERMATOLOGY | Facility: CLINIC | Age: 84
End: 2019-06-28
Payer: MEDICARE

## 2019-06-28 DIAGNOSIS — D04.5 CARCINOMA IN SITU OF SKIN OF TRUNK: ICD-10-CM

## 2019-06-28 DIAGNOSIS — D09.9 CARCINOMA IN SITU, UNSPECIFIED: ICD-10-CM

## 2019-06-28 DIAGNOSIS — C44.519 BASAL CELL CARCINOMA OF SKIN OF OTHER PART OF TRUNK: ICD-10-CM

## 2019-06-28 PROCEDURE — 17262 DSTRJ MAL LES T/A/L 1.1-2.0: CPT | Mod: 59,GC

## 2019-06-28 PROCEDURE — 17263 DSTRJ MAL LES T/A/L 2.1-3.0: CPT | Mod: 59,GC

## 2019-09-05 ENCOUNTER — APPOINTMENT (OUTPATIENT)
Dept: UROLOGY | Facility: CLINIC | Age: 84
End: 2019-09-05

## 2019-09-19 ENCOUNTER — APPOINTMENT (OUTPATIENT)
Dept: UROLOGY | Facility: CLINIC | Age: 84
End: 2019-09-19

## 2019-10-15 ENCOUNTER — INPATIENT (INPATIENT)
Facility: HOSPITAL | Age: 84
LOS: 5 days | Discharge: NURSING, REHAB OR LONG-TERM CARE | DRG: 393 | End: 2019-10-21
Attending: INTERNAL MEDICINE | Admitting: INTERNAL MEDICINE
Payer: MEDICARE

## 2019-10-15 VITALS
WEIGHT: 160.06 LBS | RESPIRATION RATE: 16 BRPM | OXYGEN SATURATION: 97 % | DIASTOLIC BLOOD PRESSURE: 60 MMHG | SYSTOLIC BLOOD PRESSURE: 99 MMHG | TEMPERATURE: 98 F | HEART RATE: 86 BPM

## 2019-10-15 DIAGNOSIS — E11.9 TYPE 2 DIABETES MELLITUS WITHOUT COMPLICATIONS: ICD-10-CM

## 2019-10-15 DIAGNOSIS — Z85.828 PERSONAL HISTORY OF OTHER MALIGNANT NEOPLASM OF SKIN: Chronic | ICD-10-CM

## 2019-10-15 DIAGNOSIS — D64.9 ANEMIA, UNSPECIFIED: ICD-10-CM

## 2019-10-15 DIAGNOSIS — R13.10 DYSPHAGIA, UNSPECIFIED: ICD-10-CM

## 2019-10-15 DIAGNOSIS — F32.9 MAJOR DEPRESSIVE DISORDER, SINGLE EPISODE, UNSPECIFIED: ICD-10-CM

## 2019-10-15 DIAGNOSIS — E86.0 DEHYDRATION: ICD-10-CM

## 2019-10-15 DIAGNOSIS — K62.89 OTHER SPECIFIED DISEASES OF ANUS AND RECTUM: ICD-10-CM

## 2019-10-15 DIAGNOSIS — N18.3 CHRONIC KIDNEY DISEASE, STAGE 3 (MODERATE): ICD-10-CM

## 2019-10-15 DIAGNOSIS — R79.89 OTHER SPECIFIED ABNORMAL FINDINGS OF BLOOD CHEMISTRY: ICD-10-CM

## 2019-10-15 LAB
ALBUMIN SERPL ELPH-MCNC: 3.5 G/DL — SIGNIFICANT CHANGE UP (ref 3.3–5)
ALP SERPL-CCNC: 71 U/L — SIGNIFICANT CHANGE UP (ref 40–120)
ALT FLD-CCNC: 11 U/L — SIGNIFICANT CHANGE UP (ref 10–45)
ANION GAP SERPL CALC-SCNC: 16 MMOL/L — SIGNIFICANT CHANGE UP (ref 5–17)
APPEARANCE UR: CLEAR — SIGNIFICANT CHANGE UP
AST SERPL-CCNC: 15 U/L — SIGNIFICANT CHANGE UP (ref 10–40)
BACTERIA # UR AUTO: NEGATIVE — SIGNIFICANT CHANGE UP
BASOPHILS # BLD AUTO: 0.03 K/UL — SIGNIFICANT CHANGE UP (ref 0–0.2)
BASOPHILS NFR BLD AUTO: 0.2 % — SIGNIFICANT CHANGE UP (ref 0–2)
BILIRUB SERPL-MCNC: 0.5 MG/DL — SIGNIFICANT CHANGE UP (ref 0.2–1.2)
BILIRUB UR-MCNC: NEGATIVE — SIGNIFICANT CHANGE UP
BUN SERPL-MCNC: 18 MG/DL — SIGNIFICANT CHANGE UP (ref 7–23)
CALCIUM SERPL-MCNC: 9.7 MG/DL — SIGNIFICANT CHANGE UP (ref 8.4–10.5)
CHLORIDE SERPL-SCNC: 99 MMOL/L — SIGNIFICANT CHANGE UP (ref 96–108)
CK MB BLD-MCNC: 3.6 % — HIGH (ref 0–3.5)
CK MB CFR SERPL CALC: 4.4 NG/ML — SIGNIFICANT CHANGE UP (ref 0–6.7)
CK SERPL-CCNC: 121 U/L — SIGNIFICANT CHANGE UP (ref 30–200)
CO2 SERPL-SCNC: 24 MMOL/L — SIGNIFICANT CHANGE UP (ref 22–31)
COLOR SPEC: YELLOW — SIGNIFICANT CHANGE UP
CREAT SERPL-MCNC: 1.4 MG/DL — HIGH (ref 0.5–1.3)
DIFF PNL FLD: NEGATIVE — SIGNIFICANT CHANGE UP
EOSINOPHIL # BLD AUTO: 0.02 K/UL — SIGNIFICANT CHANGE UP (ref 0–0.5)
EOSINOPHIL NFR BLD AUTO: 0.1 % — SIGNIFICANT CHANGE UP (ref 0–6)
EPI CELLS # UR: 0 /HPF — SIGNIFICANT CHANGE UP
GLUCOSE BLDC GLUCOMTR-MCNC: 110 MG/DL — HIGH (ref 70–99)
GLUCOSE SERPL-MCNC: 151 MG/DL — HIGH (ref 70–99)
GLUCOSE UR QL: NEGATIVE — SIGNIFICANT CHANGE UP
HCT VFR BLD CALC: 35.6 % — LOW (ref 39–50)
HGB BLD-MCNC: 11.3 G/DL — LOW (ref 13–17)
HYALINE CASTS # UR AUTO: 3 /LPF — HIGH (ref 0–2)
IMM GRANULOCYTES NFR BLD AUTO: 0.4 % — SIGNIFICANT CHANGE UP (ref 0–1.5)
KETONES UR-MCNC: ABNORMAL
LEUKOCYTE ESTERASE UR-ACNC: NEGATIVE — SIGNIFICANT CHANGE UP
LYMPHOCYTES # BLD AUTO: 1.42 K/UL — SIGNIFICANT CHANGE UP (ref 1–3.3)
LYMPHOCYTES # BLD AUTO: 10.1 % — LOW (ref 13–44)
MCHC RBC-ENTMCNC: 31.7 GM/DL — LOW (ref 32–36)
MCHC RBC-ENTMCNC: 32.2 PG — SIGNIFICANT CHANGE UP (ref 27–34)
MCV RBC AUTO: 101.4 FL — HIGH (ref 80–100)
MONOCYTES # BLD AUTO: 0.76 K/UL — SIGNIFICANT CHANGE UP (ref 0–0.9)
MONOCYTES NFR BLD AUTO: 5.4 % — SIGNIFICANT CHANGE UP (ref 2–14)
NEUTROPHILS # BLD AUTO: 11.82 K/UL — HIGH (ref 1.8–7.4)
NEUTROPHILS NFR BLD AUTO: 83.8 % — HIGH (ref 43–77)
NITRITE UR-MCNC: NEGATIVE — SIGNIFICANT CHANGE UP
NRBC # BLD: 0 /100 WBCS — SIGNIFICANT CHANGE UP (ref 0–0)
PH UR: 5.5 — SIGNIFICANT CHANGE UP (ref 5–8)
PLATELET # BLD AUTO: 261 K/UL — SIGNIFICANT CHANGE UP (ref 150–400)
POTASSIUM SERPL-MCNC: 5.2 MMOL/L — SIGNIFICANT CHANGE UP (ref 3.5–5.3)
POTASSIUM SERPL-SCNC: 5.2 MMOL/L — SIGNIFICANT CHANGE UP (ref 3.5–5.3)
PROT SERPL-MCNC: 7.2 G/DL — SIGNIFICANT CHANGE UP (ref 6–8.3)
PROT UR-MCNC: ABNORMAL
RBC # BLD: 3.51 M/UL — LOW (ref 4.2–5.8)
RBC # FLD: 13.5 % — SIGNIFICANT CHANGE UP (ref 10.3–14.5)
RBC CASTS # UR COMP ASSIST: 3 /HPF — SIGNIFICANT CHANGE UP (ref 0–4)
SODIUM SERPL-SCNC: 139 MMOL/L — SIGNIFICANT CHANGE UP (ref 135–145)
SP GR SPEC: 1.03 — HIGH (ref 1.01–1.02)
TROPONIN T, HIGH SENSITIVITY RESULT: 55 NG/L — HIGH (ref 0–51)
TROPONIN T, HIGH SENSITIVITY RESULT: 58 NG/L — HIGH (ref 0–51)
TROPONIN T, HIGH SENSITIVITY RESULT: 64 NG/L — HIGH (ref 0–51)
UROBILINOGEN FLD QL: NEGATIVE — SIGNIFICANT CHANGE UP
WBC # BLD: 14.11 K/UL — HIGH (ref 3.8–10.5)
WBC # FLD AUTO: 14.11 K/UL — HIGH (ref 3.8–10.5)
WBC UR QL: 2 /HPF — SIGNIFICANT CHANGE UP (ref 0–5)

## 2019-10-15 PROCEDURE — 71045 X-RAY EXAM CHEST 1 VIEW: CPT | Mod: 26

## 2019-10-15 PROCEDURE — 93010 ELECTROCARDIOGRAM REPORT: CPT

## 2019-10-15 PROCEDURE — 74177 CT ABD & PELVIS W/CONTRAST: CPT | Mod: 26

## 2019-10-15 PROCEDURE — 99223 1ST HOSP IP/OBS HIGH 75: CPT

## 2019-10-15 PROCEDURE — 99285 EMERGENCY DEPT VISIT HI MDM: CPT | Mod: GC

## 2019-10-15 RX ORDER — SODIUM CHLORIDE 9 MG/ML
1000 INJECTION, SOLUTION INTRAVENOUS
Refills: 0 | Status: DISCONTINUED | OUTPATIENT
Start: 2019-10-15 | End: 2019-10-21

## 2019-10-15 RX ORDER — SENNA PLUS 8.6 MG/1
2 TABLET ORAL AT BEDTIME
Refills: 0 | Status: DISCONTINUED | OUTPATIENT
Start: 2019-10-15 | End: 2019-10-21

## 2019-10-15 RX ORDER — INSULIN LISPRO 100/ML
VIAL (ML) SUBCUTANEOUS AT BEDTIME
Refills: 0 | Status: DISCONTINUED | OUTPATIENT
Start: 2019-10-15 | End: 2019-10-21

## 2019-10-15 RX ORDER — HEPARIN SODIUM 5000 [USP'U]/ML
5000 INJECTION INTRAVENOUS; SUBCUTANEOUS EVERY 12 HOURS
Refills: 0 | Status: DISCONTINUED | OUTPATIENT
Start: 2019-10-15 | End: 2019-10-21

## 2019-10-15 RX ORDER — DEXTROSE 50 % IN WATER 50 %
25 SYRINGE (ML) INTRAVENOUS ONCE
Refills: 0 | Status: DISCONTINUED | OUTPATIENT
Start: 2019-10-15 | End: 2019-10-21

## 2019-10-15 RX ORDER — MUPIROCIN 20 MG/G
1 OINTMENT TOPICAL
Qty: 0 | Refills: 0 | DISCHARGE

## 2019-10-15 RX ORDER — PANTOPRAZOLE SODIUM 20 MG/1
40 TABLET, DELAYED RELEASE ORAL
Refills: 0 | Status: DISCONTINUED | OUTPATIENT
Start: 2019-10-15 | End: 2019-10-21

## 2019-10-15 RX ORDER — ONDANSETRON 8 MG/1
4 TABLET, FILM COATED ORAL EVERY 6 HOURS
Refills: 0 | Status: DISCONTINUED | OUTPATIENT
Start: 2019-10-15 | End: 2019-10-21

## 2019-10-15 RX ORDER — MIRTAZAPINE 45 MG/1
15 TABLET, ORALLY DISINTEGRATING ORAL AT BEDTIME
Refills: 0 | Status: DISCONTINUED | OUTPATIENT
Start: 2019-10-15 | End: 2019-10-21

## 2019-10-15 RX ORDER — DOCUSATE SODIUM 100 MG
100 CAPSULE ORAL THREE TIMES A DAY
Refills: 0 | Status: DISCONTINUED | OUTPATIENT
Start: 2019-10-15 | End: 2019-10-21

## 2019-10-15 RX ORDER — GLUCAGON INJECTION, SOLUTION 0.5 MG/.1ML
1 INJECTION, SOLUTION SUBCUTANEOUS ONCE
Refills: 0 | Status: DISCONTINUED | OUTPATIENT
Start: 2019-10-15 | End: 2019-10-21

## 2019-10-15 RX ORDER — SODIUM CHLORIDE 9 MG/ML
1000 INJECTION INTRAMUSCULAR; INTRAVENOUS; SUBCUTANEOUS ONCE
Refills: 0 | Status: COMPLETED | OUTPATIENT
Start: 2019-10-15 | End: 2019-10-15

## 2019-10-15 RX ORDER — ESCITALOPRAM OXALATE 10 MG/1
10 TABLET, FILM COATED ORAL DAILY
Refills: 0 | Status: DISCONTINUED | OUTPATIENT
Start: 2019-10-15 | End: 2019-10-21

## 2019-10-15 RX ORDER — DEXTROSE 50 % IN WATER 50 %
15 SYRINGE (ML) INTRAVENOUS ONCE
Refills: 0 | Status: DISCONTINUED | OUTPATIENT
Start: 2019-10-15 | End: 2019-10-21

## 2019-10-15 RX ORDER — SODIUM CHLORIDE 9 MG/ML
1000 INJECTION INTRAMUSCULAR; INTRAVENOUS; SUBCUTANEOUS
Refills: 0 | Status: DISCONTINUED | OUTPATIENT
Start: 2019-10-15 | End: 2019-10-17

## 2019-10-15 RX ORDER — MULTIVIT-MIN/FERROUS GLUCONATE 9 MG/15 ML
1 LIQUID (ML) ORAL
Qty: 0 | Refills: 0 | DISCHARGE

## 2019-10-15 RX ORDER — FERROUS SULFATE 325(65) MG
0 TABLET ORAL
Qty: 0 | Refills: 0 | DISCHARGE

## 2019-10-15 RX ORDER — INSULIN LISPRO 100/ML
VIAL (ML) SUBCUTANEOUS
Refills: 0 | Status: DISCONTINUED | OUTPATIENT
Start: 2019-10-15 | End: 2019-10-21

## 2019-10-15 RX ORDER — DEXTROSE 50 % IN WATER 50 %
12.5 SYRINGE (ML) INTRAVENOUS ONCE
Refills: 0 | Status: DISCONTINUED | OUTPATIENT
Start: 2019-10-15 | End: 2019-10-21

## 2019-10-15 RX ORDER — POLYETHYLENE GLYCOL 3350 17 G/17G
17 POWDER, FOR SOLUTION ORAL
Qty: 0 | Refills: 0 | DISCHARGE

## 2019-10-15 RX ORDER — ASPIRIN/CALCIUM CARB/MAGNESIUM 324 MG
81 TABLET ORAL DAILY
Refills: 0 | Status: DISCONTINUED | OUTPATIENT
Start: 2019-10-15 | End: 2019-10-21

## 2019-10-15 RX ORDER — CLONAZEPAM 1 MG
0.5 TABLET ORAL AT BEDTIME
Refills: 0 | Status: DISCONTINUED | OUTPATIENT
Start: 2019-10-15 | End: 2019-10-21

## 2019-10-15 RX ADMIN — SODIUM CHLORIDE 1000 MILLILITER(S): 9 INJECTION INTRAMUSCULAR; INTRAVENOUS; SUBCUTANEOUS at 14:22

## 2019-10-15 RX ADMIN — SODIUM CHLORIDE 60 MILLILITER(S): 9 INJECTION INTRAMUSCULAR; INTRAVENOUS; SUBCUTANEOUS at 22:13

## 2019-10-15 NOTE — ED ADULT NURSE NOTE - NSIMPLEMENTINTERV_GEN_ALL_ED
Implemented All Fall Risk Interventions:  [place] to call system. Call bell, personal items and telephone within reach. Instruct patient to call for assistance. Room bathroom lighting operational. Non-slip footwear when patient is off stretcher. Physically safe environment: no spills, clutter or unnecessary equipment. Stretcher in lowest position, wheels locked, appropriate side rails in place. Provide visual cue, wrist band, yellow gown, etc. Monitor gait and stability. Monitor for mental status changes and reorient to person, place, and time. Review medications for side effects contributing to fall risk. Reinforce activity limits and safety measures with patient and family.

## 2019-10-15 NOTE — ED PROVIDER NOTE - PHYSICAL EXAMINATION
GENERAL: elderly frail male in no acute distress, non-toxic appearing  HEAD: normocephalic, atraumatic  HEENT: normal conjunctiva, oral mucosa moist, neck supple  CARDIAC: regular rate and rhythm, normal S1 and S2, systolic murmurs  PULM: normal breath sounds, clear to ascultation bilaterally, no rales, rhonchi, or wheezing  GI: abdomen nondistended, soft, tender in LLQ with guarding; no rebound tenderness  : no suprapubic pain  NEURO: AAOx3, normal speech, PERRLA, EOMI, no focal motor or sensory deficits  MSK: no peripheral edema, no calf tenderness/redness/swelling, no visible deformities  SKIN: bruise on R hand, well-perfused, hands cold but proximal extremities warm, no visible rashes  PSYCH: appropriate mood and affect

## 2019-10-15 NOTE — H&P ADULT - PROBLEM SELECTOR PLAN 7
Suspect patient has significant degree depressive symptoms which is contributing to his overall condition.   Poor PO intake, weight loss, poor sleep, lack of interest in activities.   Currently on remeron and lexapro for many years but likely will need psyc eval for adjustment of meds.   D/w patient and daughter.

## 2019-10-15 NOTE — H&P ADULT - PROBLEM SELECTOR PLAN 4
Cr at baseline (1.39-1.4s) previously  Currently 1.4  Monitor outpt and renal function  avoid nephrotoxic agents.

## 2019-10-15 NOTE — H&P ADULT - HISTORY OF PRESENT ILLNESS
88 yo m with h/o HTN, HLD, DM2, pancreatitis, basal/squamous cell carcinoma s/p Mohs surg, depression, anxiety presented from assisted living facility c/o abd pain and constipation last night. Patient is somewhat poor historian and collaborating information obtained from his daughter at bedside. Per family, patient called his son at 1 am last night reporting severe lower abd pain.  He has not had BM for few days which is not unusual as per family but not associated with pain. Patient did have BM overnight which did relieve his pain but family was concerned about overall fluid status and decided to bring him in to ED today. Patient has had very poor po intake over many months and has lost about 30 lbs over 1 year per daughter. Also reporting difficulty swallowing sec to pain at times and had seen an ENT as outpt who performed larynoscopy which was unremarkable. Patient also saw a GI given weight loss but did not recommend any colonoscopy (last colon was 10 years ago). Patient denies any melena or BRBPR. Alos reporting some "spitting up of bile at time." +nausea but denies vomiting. Patient has had slow functional decline over many months with decrease in PO, weight, poor sleep, lack of interest in activity. Has been taking Lexapro and Remeron for many years.     In the ED, VS: 99.1, 118/86, 78, 23, 95% RA. Lying comfortably in bed without pain.

## 2019-10-15 NOTE — CONSULT NOTE ADULT - SUBJECTIVE AND OBJECTIVE BOX
CHIEF COMPLAINT:Patient is a 87y old  Male who presents with a chief complaint of abd pain, weight loss (15 Oct 2019 18:19)      HPI:  86 yo m with h/o HTN, HLD, DM2, pancreatitis, basal/squamous cell carcinoma s/p Mohs surg, depression, anxiety presented from assisted living facility c/o abd pain and constipation last night. Patient is somewhat poor historian and collaborating information obtained from his daughter at bedside. Per family, patient called his son at 1 am last night reporting severe lower abd pain.  He has not had BM for few days which is not unusual as per family but not associated with pain. Patient did have BM overnight which did relieve his pain but family was concerned about overall fluid status and decided to bring him in to ED today. Patient has had very poor po intake over many months and has lost about 30 lbs over 1 year per daughter. Also reporting difficulty swallowing sec to pain at times and had seen an ENT as outpt who performed larynoscopy which was unremarkable. Patient also saw a GI given weight loss but did not recommend any colonoscopy (last colon was 10 years ago). Patient denies any melena or BRBPR. Alos reporting some "spitting up of bile at time." +nausea but denies vomiting. Patient has had slow functional decline over many months with decrease in PO, weight, poor sleep, lack of interest in activity. Has been taking Lexapro and Remeron for many years.     In the ED, VS: 99.1, 118/86, 78, 23, 95% RA. Lying comfortably in bed without pain. (15 Oct 2019 18:19)      PAST MEDICAL & SURGICAL HISTORY:  Squamous cell carcinoma  Basal cell carcinoma of skin  Pancreatitis: years ago  Diabetes mellitus: type 2  Hyperlipidemia  Anxiety  Depression  H/O Moh's micrographic surgery for skin cancer: 4/30/18 right cheek  Renal cyst surgery in 2008  Inguinal hernia s/p repair (L)      MEDICATIONS  (STANDING):  aspirin enteric coated 81 milliGRAM(s) Oral daily  clonazePAM  Tablet 0.5 milliGRAM(s) Oral at bedtime  dextrose 5%. 1000 milliLiter(s) (50 mL/Hr) IV Continuous <Continuous>  dextrose 50% Injectable 12.5 Gram(s) IV Push once  dextrose 50% Injectable 25 Gram(s) IV Push once  dextrose 50% Injectable 25 Gram(s) IV Push once  docusate sodium 100 milliGRAM(s) Oral three times a day  escitalopram 10 milliGRAM(s) Oral daily  heparin  Injectable 5000 Unit(s) SubCutaneous every 12 hours  insulin lispro (HumaLOG) corrective regimen sliding scale   SubCutaneous three times a day before meals  insulin lispro (HumaLOG) corrective regimen sliding scale   SubCutaneous at bedtime  mirtazapine 15 milliGRAM(s) Oral at bedtime  multivitamin 1 Tablet(s) Oral daily  pantoprazole    Tablet 40 milliGRAM(s) Oral before breakfast  senna 2 Tablet(s) Oral at bedtime    MEDICATIONS  (PRN):  dextrose 40% Gel 15 Gram(s) Oral once PRN Blood Glucose LESS THAN 70 milliGRAM(s)/deciliter  glucagon  Injectable 1 milliGRAM(s) IntraMuscular once PRN Glucose LESS THAN 70 milligrams/deciliter  ondansetron Injectable 4 milliGRAM(s) IV Push every 6 hours PRN Nausea      FAMILY HISTORY:  FH: brain tumor  FH: diabetes mellitus      SOCIAL HISTORY:    [ ] Non-smoker  [ ] Smoker  [ ] Alcohol    Allergies    penicillin (Rash)  quinidine (Unknown)    Intolerances    	    REVIEW OF SYSTEMS:  CONSTITUTIONAL: No fever, weight loss, or fatigue  EYES: No eye pain, visual disturbances, or discharge  ENT:  No difficulty hearing, tinnitus, vertigo; No sinus or throat pain  NECK: No pain or stiffness  RESPIRATORY: No cough, wheezing, chills or hemoptysis; No Shortness of Breath  CARDIOVASCULAR: No chest pain, palpitations, passing out, dizziness, or leg swelling  GASTROINTESTINAL: No abdominal or epigastric pain. No nausea, vomiting, or hematemesis; No diarrhea or constipation. No melena or hematochezia.  GENITOURINARY: No dysuria, frequency, hematuria, or incontinence  NEUROLOGICAL: No headaches, memory loss, loss of strength, numbness, or tremors  SKIN: No itching, burning, rashes, or lesions   LYMPH Nodes: No enlarged glands  ENDOCRINE: No heat or cold intolerance; No hair loss  MUSCULOSKELETAL: No joint pain or swelling; No muscle, back, or extremity pain  PSYCHIATRIC: No depression, anxiety, mood swings, or difficulty sleeping  HEME/LYMPH: No easy bruising, or bleeding gums  ALLERGY AND IMMUNOLOGIC: No hives or eczema	    [ ] All others negative	  [x ] Unable to obtain    PHYSICAL EXAM:  T(C): 37 (10-15-19 @ 19:23), Max: 37.3 (10-15-19 @ 14:40)  HR: 72 (10-15-19 @ 19:23) (72 - 86)  BP: 102/63 (10-15-19 @ 19:23) (95/53 - 125/60)  RR: 18 (10-15-19 @ 19:23) (16 - 23)  SpO2: 96% (10-15-19 @ 19:23) (95% - 99%)  Wt(kg): --  I&O's Summary      Appearance: Normal	  HEENT:   Normal oral mucosa, PERRL, EOMI	  Lymphatic: No lymphadenopathy  Cardiovascular: Normal S1 S2, No JVD, + murmurs, No edema  Respiratory: rhonchi  Psychiatry: A & O x 3, Mood & affect appropriate  Gastrointestinal:  Soft, Non-tender, + BS	  Skin: No rashes, No ecchymoses, No cyanosis	  Neurologic: Non-focal  Extremities: Normal range of motion, No clubbing, cyanosis or edema  Vascular: Peripheral pulses palpable 2+ bilaterally    TELEMETRY: 	    ECG:  	  RADIOLOGY:  OTHER: 	  	  LABS:	 	    CARDIAC MARKERS:                              11.3   14.11 )-----------( 261      ( 15 Oct 2019 12:47 )             35.6     10-15    139  |  99  |  18  ----------------------------<  151<H>  5.2   |  24  |  1.40<H>    Ca    9.7      15 Oct 2019 12:47    TPro  7.2  /  Alb  3.5  /  TBili  0.5  /  DBili  x   /  AST  15  /  ALT  11  /  AlkPhos  71  10-15    proBNP:   Lipid Profile:   HgA1c:   TSH:       PREVIOUS DIAGNOSTIC TESTING:    < from: 12 Lead ECG (01.23.19 @ 08:57) >  Diagnosis Line NORMAL SINUS RHYTHM  RIGHT BUNDLE BRANCH BLOCK  LEFT ANTERIOR FASCICULAR BLOCK  *** BIFASCICULAR BLOCK ***  ABNORMAL ECG    Troponin T, High Sensitivity (10.15.19 @ 17:16)    Troponin T, High Sensitivity Result: 55: Rapid upward or downward changes in high-sensitivity troponin levels  suggest acute myocardial injury. Renal impairment may cause sustained  troponin elevations.  Normal: <6 - 14 ng/L  Indeterminate: 15-51 ng/L  Elevated: > 51 ng/L  See http://labs/test/[email or web address] on the [facility] intranet for more  information ng/L    < from: CT Abdomen and Pelvis w/ IV Cont (10.15.19 @ 14:15) >  BOWEL: Sigmoid diverticulosis. Mural thickening and pericolonic   inflammatory change centered around the rectum, new since prior study. No   bowel obstruction. Appendix is normal.  PERITONEUM: No ascites.  VESSELS: Atherosclerotic changes.  RETROPERITONEUM/LYMPH NODES: No lymphadenopathy.    ABDOMINAL WALL: Ventral fat-containing inguinal hernias.  BONES: Degenerative changes.    IMPRESSION:     Proctitis. No evidence of pneumoperitoneum or adjacent fluid collection.    < from: TTE with Doppler (w/Cont) (09.15.12 @ 12:39) >  1. Mitral annular calcification, otherwise normal mitral  valve. Mild-moderate mitral regurgitation.  2. Endocardium not well visualized; grossly normal left  ventricular systolic function.  Endocardial visualization  enhanced with intravenus injection of echo contrast  (Optison).  3. The right ventricleis not well visualized; grossly  normal right ventricular systolic function.    < from: TTE with Doppler (w/Cont) (09.15.12 @ 12:39) >  1. Mitral annular calcification, otherwise normal mitral  valve. Mild-moderate mitral regurgitation.  2. Endocardium not well visualized; grossly normal left  ventricular systolic function.  Endocardial visualization  enhanced with intravenus injection of echo contrast  (Optison).  3. The right ventricleis not well visualized; grossly  normal right ventricular systolic function.

## 2019-10-15 NOTE — CHART NOTE - NSCHARTNOTEFT_GEN_A_CORE
MEDICINE PA     EVENT SUMMARY    Troponin T, High Sensitivity (10.15.19 @ 20:47)    Troponin T, High Sensitivity Result: 64: Rapid upward or downward changes in high-sensitivity troponin levels  suggest acute myocardial injury. Renal impairment may cause sustained  troponin elevations.  Normal: <6 - 14 ng/L  Indeterminate: 15-51 ng/L  Elevated: > 51 ng/L  See http://labs/test/TROPTHS on the [facility] intranet for more  information ng/L  CKMB Mass Assay (10.15.19 @ 20:47)    CKMB Units: 4.4 ng/mL    CPK Mass Assay %: 3.6 %  Creatine Kinase, Serum (10.15.19 @ 20:47)    Creatine Kinase, Serum: 121 U/L    Notified by RN for 2.7 sec pause and bradycardic to 47 seen on tele. Pt asymptomatic. Pt is alert. Pt was resting comfortably at the time of the event; denies CP, SOB, dyspnea, lightheadedness, dizziness, numbness, tingling, weakness. On tele pt is sinus 60s.     MEDICATIONS  (STANDING):  aspirin enteric coated 81 milliGRAM(s) Oral daily  clonazePAM  Tablet 0.5 milliGRAM(s) Oral at bedtime  dextrose 5%. 1000 milliLiter(s) (50 mL/Hr) IV Continuous <Continuous>  dextrose 50% Injectable 12.5 Gram(s) IV Push once  dextrose 50% Injectable 25 Gram(s) IV Push once  dextrose 50% Injectable 25 Gram(s) IV Push once  docusate sodium 100 milliGRAM(s) Oral three times a day  escitalopram 10 milliGRAM(s) Oral daily  heparin  Injectable 5000 Unit(s) SubCutaneous every 12 hours  insulin lispro (HumaLOG) corrective regimen sliding scale   SubCutaneous three times a day before meals  insulin lispro (HumaLOG) corrective regimen sliding scale   SubCutaneous at bedtime  mirtazapine 15 milliGRAM(s) Oral at bedtime  multivitamin 1 Tablet(s) Oral daily  pantoprazole    Tablet 40 milliGRAM(s) Oral before breakfast  senna 2 Tablet(s) Oral at bedtime  sodium chloride 0.9%. 1000 milliLiter(s) (60 mL/Hr) IV Continuous <Continuous>    MEDICATIONS  (PRN):  dextrose 40% Gel 15 Gram(s) Oral once PRN Blood Glucose LESS THAN 70 milliGRAM(s)/deciliter  glucagon  Injectable 1 milliGRAM(s) IntraMuscular once PRN Glucose LESS THAN 70 milligrams/deciliter  ondansetron Injectable 4 milliGRAM(s) IV Push every 6 hours PRN Nausea    ICU Vital Signs Last 24 Hrs  T(C): 36.3 (15 Oct 2019 21:30), Max: 37.3 (15 Oct 2019 14:40)  T(F): 97.4 (15 Oct 2019 21:30), Max: 99.1 (15 Oct 2019 14:40)  HR: 73 (15 Oct 2019 21:30) (72 - 86)  BP: 103/63 (15 Oct 2019 21:30) (95/53 - 125/60)  BP(mean): --  ABP: --  ABP(mean): --  RR: 18 (15 Oct 2019 21:30) (16 - 23)  SpO2: 95% (15 Oct 2019 21:30) (95% - 99%)    A&P  86 yo m with h/o HTN, HLD, DM2, pancreatitis, basal/squamous cell carcinoma s/p Mohs surg, depression, anxiety presented from assisted living facility c/o abd pain and constipation last night. Patient is somewhat poor historian and collaborating information obtained from his daughter at bedside. Per family, patient called his son at 1 am last night reporting severe lower abd pain.  He has not had BM for few days which is not unusual as per family but not associated with pain. Patient did have BM overnight which did relieve his pain but family was concerned about overall fluid status and decided to bring him in to ED today. Patient has had very poor po intake over many months and has lost about 30 lbs over 1 year per daughter. Also reporting difficulty swallowing sec to pain at times and had seen an ENT as outpt who performed larynoscopy which was unremarkable. Patient also saw a GI given weight loss but did not recommend any colonoscopy (last colon was 10 years ago). Patient denies any melena or BRBPR. Alos reporting some "spitting up of bile at time." +nausea but denies vomiting. Patient has had slow functional decline over many months with decrease in PO, weight, poor sleep, lack of interest in activity. Has been taking Lexapro and Remeron for many years.     In the ED, VS: 99.1, 118/86, 78, 23, 95% RA. Lying comfortably in bed without pain. (15 Oct 2019 18:19)    Asymptomatic bradycardia/ pause  - c/w vitals and tele   - EKG   - TSH in AM  - hold off with AVN blockers  - R2 pads at the bedside  Elevated troponin  D/w Dr. Ansari; no acute intervention ; recommended IVF for 1L 60 cc /hr. Will continue to monitor.     Alesia TOBIAS  #[id number]

## 2019-10-15 NOTE — H&P ADULT - NSICDXPASTMEDICALHX_GEN_ALL_CORE_FT
PAST MEDICAL HISTORY:  Anxiety     Basal cell carcinoma of skin     Depression     Diabetes mellitus type 2    Hyperlipidemia     Pancreatitis years ago    Squamous cell carcinoma

## 2019-10-15 NOTE — H&P ADULT - PROBLEM SELECTOR PLAN 3
Patient denies any chest pain , SOB or any concerning signs for ACS  Trop previous elevated to 40s as well likely sec to renal dysfunction.   Would not trend further unless patient has symptoms.

## 2019-10-15 NOTE — ED ADULT NURSE REASSESSMENT NOTE - NS ED NURSE REASSESS COMMENT FT1
pt is an 87 yr M presents with LLQ pain associated with constipation and then an episode of diarrhea. no current diarrhea/n/v/pain/fever/chills. pt awaiting CT results, daughter at bedside.

## 2019-10-15 NOTE — ED PROVIDER NOTE - ATTENDING CONTRIBUTION TO CARE
I have seen and evaluated this patient with the resident.   I agree with the findings  unless other wise stated.  I have made appropriate changes in documentations where needed, After my face to face bedside evaluation, I am further  notiny/o M h/o HLD, T2DM, pancreatitis, hernia p/w diarrhea. Pt states that he had constipation overnight and had pain when straining for bowel movement. He had diarrhea and has felt nauseous this morning and went to the ED. Pt with son and daughter in law at bedside state that he has lost about 30 pounds over the past year and has not been eating. He is complaining of some throat pain. Denies fevers, chills, chest pain, sob, weakness, tingling.    Pt alert no distress dehydration+ flattening of right nasolabial fold No carotid bruit Heart regular s1s2 Air entry fair no rales Abdomen soft surgical scar of hernia repair No masses changes of advanced degenerative changes of joints Ecchymoses over both  knees 2/2 fall concern for weight loss dehydration  labs CT scans re eval admit --Martinez

## 2019-10-15 NOTE — H&P ADULT - NSICDXPASTSURGICALHX_GEN_ALL_CORE_FT
PAST SURGICAL HISTORY:  H/O Moh's micrographic surgery for skin cancer 4/30/18 right cheek    Inguinal hernia s/p repair (L)     Renal cyst surgery in 2008

## 2019-10-15 NOTE — H&P ADULT - NSHPLABSRESULTS_GEN_ALL_CORE
11.3   14.11 )-----------( 261      ( 15 Oct 2019 12:47 )             35.6   10-15    139  |  99  |  18  ----------------------------<  151<H>  5.2   |  24  |  1.40<H>    Ca    9.7      15 Oct 2019 12:47    TPro  7.2  /  Alb  3.5  /  TBili  0.5  /  DBili  x   /  AST  15  /  ALT  11  /  AlkPhos  71  10-15

## 2019-10-15 NOTE — ED ADULT NURSE NOTE - OBJECTIVE STATEMENT
Pt is an ambulatory 87 yr old male A/oX 3 who is a poor historian and not forthcoming with information sent from assisted living facility for diarrhea.  As per patient he was constipated and treated, now he is going to the bathroom too much.  No recent antibiotic use or hospitalizations.  PERRL wnl, pleitez with equal strength.  Denies chest pain or sob.  LUNGS CTA.  No fevers, chills.  Pt asked for emesis bag although denies vomiting and nausea. States he has been spitting a lot since starting an unknown medication.  No urinary symptoms.  Peripheral pulses +2bl no edema

## 2019-10-15 NOTE — ED PROVIDER NOTE - NS ED ROS FT
GENERAL: denies fever, chills  HEENT: +dysphagia; denies headaches, lightheadedness, dizziness, vision changes, congestion  CARDIAC: denies chest pain, palpitations  PULM: denies dyspnea, wheezing, cough  GI: +abdominal pain, nausea; denies vomiting, diarrhea, constipation, melena, hematochezia  : denies dysuria, frequency, incontinence, hematuria  NEURO: denies motor weakness, sensory changes  MSK: denies joint, muscle pain  SKIN: denies new rashes, hives  HEME: denies active bleeding

## 2019-10-15 NOTE — H&P ADULT - ASSESSMENT
88 yo m with h/o HTN, HLD, DM2, pancreatitis, basal/squamous cell carcinoma s/p Mohs surg, depression, anxiety presented from assisted living facility c/o abd pain with proctitis

## 2019-10-15 NOTE — ED PROVIDER NOTE - CLINICAL SUMMARY MEDICAL DECISION MAKING FREE TEXT BOX
88y/o M h/o HLD, T2DM, pancreatitis, hernia p/w multiple complaints including 30 pound weight loss and inability to tolerate PO. Pt has LLQ pain. CTa/p w/ IV contrast r/o diverticulitis. TBA for failure to thrive.

## 2019-10-15 NOTE — H&P ADULT - PROBLEM SELECTOR PLAN 6
Currently on metformin only at [facility]  hold oral med and monitor on SS  Poor PO intake, previous A1c in 7/2019 5.2

## 2019-10-15 NOTE — H&P ADULT - PROBLEM SELECTOR PLAN 2
reportedly havinf difficulty swallowing food. ?odynophagia  Seen by ENT as outpt without findings.   reports to "spitting up bile" but no vomiting.   Will start on PPI, zofran PRN.   S+S eval   Will give soft diet and monitor for tolerance.

## 2019-10-15 NOTE — H&P ADULT - PROBLEM SELECTOR PLAN 5
Likely nutritional   No evidence of active bleeding.   Has been on iron supplement but also with MCV in 100s.  previous check with TSH, B12, folate.

## 2019-10-15 NOTE — H&P ADULT - PROBLEM SELECTOR PLAN 1
Abd pain resolved after BM last night.   CT with evidence of thickened rectum.  Patient denies any diarrhea or fever.   Suspect related to constipation? elevated WBC. will monitor.   Consider GI eval for possible colonscopy/flex sig given 30lbs weight loss although may be related to poor po intake.   Cont with bowel regiment.   Monitor off abx

## 2019-10-15 NOTE — ED PROVIDER NOTE - PROGRESS NOTE DETAILS
spoke with patient pmd Dr. Juan who says to admit patient under Dr. Rebolledo.  -Rene Buckner, PGY-1 Spoke with Dr. Rebolledo who has accepted the patient

## 2019-10-16 LAB
ANION GAP SERPL CALC-SCNC: 10 MMOL/L — SIGNIFICANT CHANGE UP (ref 5–17)
BASOPHILS # BLD AUTO: 0.03 K/UL — SIGNIFICANT CHANGE UP (ref 0–0.2)
BASOPHILS NFR BLD AUTO: 0.4 % — SIGNIFICANT CHANGE UP (ref 0–2)
BUN SERPL-MCNC: 14 MG/DL — SIGNIFICANT CHANGE UP (ref 7–23)
CALCIUM SERPL-MCNC: 8.6 MG/DL — SIGNIFICANT CHANGE UP (ref 8.4–10.5)
CHLORIDE SERPL-SCNC: 103 MMOL/L — SIGNIFICANT CHANGE UP (ref 96–108)
CO2 SERPL-SCNC: 27 MMOL/L — SIGNIFICANT CHANGE UP (ref 22–31)
CREAT SERPL-MCNC: 1.25 MG/DL — SIGNIFICANT CHANGE UP (ref 0.5–1.3)
EOSINOPHIL # BLD AUTO: 0.11 K/UL — SIGNIFICANT CHANGE UP (ref 0–0.5)
EOSINOPHIL NFR BLD AUTO: 1.3 % — SIGNIFICANT CHANGE UP (ref 0–6)
FOLATE SERPL-MCNC: >20 NG/ML — SIGNIFICANT CHANGE UP
GLUCOSE BLDC GLUCOMTR-MCNC: 132 MG/DL — HIGH (ref 70–99)
GLUCOSE BLDC GLUCOMTR-MCNC: 161 MG/DL — HIGH (ref 70–99)
GLUCOSE BLDC GLUCOMTR-MCNC: 92 MG/DL — SIGNIFICANT CHANGE UP (ref 70–99)
GLUCOSE BLDC GLUCOMTR-MCNC: 92 MG/DL — SIGNIFICANT CHANGE UP (ref 70–99)
GLUCOSE SERPL-MCNC: 99 MG/DL — SIGNIFICANT CHANGE UP (ref 70–99)
HBA1C BLD-MCNC: 5.2 % — SIGNIFICANT CHANGE UP (ref 4–5.6)
HCT VFR BLD CALC: 31.6 % — LOW (ref 39–50)
HGB BLD-MCNC: 10 G/DL — LOW (ref 13–17)
IMM GRANULOCYTES NFR BLD AUTO: 0.4 % — SIGNIFICANT CHANGE UP (ref 0–1.5)
IRON SATN MFR SERPL: 22 % — SIGNIFICANT CHANGE UP (ref 16–55)
IRON SATN MFR SERPL: 28 UG/DL — LOW (ref 45–165)
LYMPHOCYTES # BLD AUTO: 1.12 K/UL — SIGNIFICANT CHANGE UP (ref 1–3.3)
LYMPHOCYTES # BLD AUTO: 13.5 % — SIGNIFICANT CHANGE UP (ref 13–44)
MCHC RBC-ENTMCNC: 31.6 GM/DL — LOW (ref 32–36)
MCHC RBC-ENTMCNC: 32.1 PG — SIGNIFICANT CHANGE UP (ref 27–34)
MCV RBC AUTO: 101.3 FL — HIGH (ref 80–100)
MONOCYTES # BLD AUTO: 0.64 K/UL — SIGNIFICANT CHANGE UP (ref 0–0.9)
MONOCYTES NFR BLD AUTO: 7.7 % — SIGNIFICANT CHANGE UP (ref 2–14)
NEUTROPHILS # BLD AUTO: 6.36 K/UL — SIGNIFICANT CHANGE UP (ref 1.8–7.4)
NEUTROPHILS NFR BLD AUTO: 76.7 % — SIGNIFICANT CHANGE UP (ref 43–77)
NRBC # BLD: 0 /100 WBCS — SIGNIFICANT CHANGE UP (ref 0–0)
PLATELET # BLD AUTO: 200 K/UL — SIGNIFICANT CHANGE UP (ref 150–400)
POTASSIUM SERPL-MCNC: 3.8 MMOL/L — SIGNIFICANT CHANGE UP (ref 3.5–5.3)
POTASSIUM SERPL-SCNC: 3.8 MMOL/L — SIGNIFICANT CHANGE UP (ref 3.5–5.3)
RBC # BLD: 3.12 M/UL — LOW (ref 4.2–5.8)
RBC # FLD: 13.8 % — SIGNIFICANT CHANGE UP (ref 10.3–14.5)
SODIUM SERPL-SCNC: 140 MMOL/L — SIGNIFICANT CHANGE UP (ref 135–145)
TIBC SERPL-MCNC: 129 UG/DL — LOW (ref 220–430)
TSH SERPL-MCNC: 1.81 UIU/ML — SIGNIFICANT CHANGE UP (ref 0.27–4.2)
TSH SERPL-MCNC: 1.81 UIU/ML — SIGNIFICANT CHANGE UP (ref 0.27–4.2)
UIBC SERPL-MCNC: 101 UG/DL — LOW (ref 110–370)
VIT B12 SERPL-MCNC: 1686 PG/ML — HIGH (ref 232–1245)
WBC # BLD: 8.29 K/UL — SIGNIFICANT CHANGE UP (ref 3.8–10.5)
WBC # FLD AUTO: 8.29 K/UL — SIGNIFICANT CHANGE UP (ref 3.8–10.5)

## 2019-10-16 PROCEDURE — 93010 ELECTROCARDIOGRAM REPORT: CPT

## 2019-10-16 RX ORDER — POLYETHYLENE GLYCOL 3350 17 G/17G
17 POWDER, FOR SOLUTION ORAL
Refills: 0 | Status: DISCONTINUED | OUTPATIENT
Start: 2019-10-16 | End: 2019-10-21

## 2019-10-16 RX ADMIN — Medication 81 MILLIGRAM(S): at 09:27

## 2019-10-16 RX ADMIN — HEPARIN SODIUM 5000 UNIT(S): 5000 INJECTION INTRAVENOUS; SUBCUTANEOUS at 17:52

## 2019-10-16 RX ADMIN — Medication 100 MILLIGRAM(S): at 21:52

## 2019-10-16 RX ADMIN — POLYETHYLENE GLYCOL 3350 17 GRAM(S): 17 POWDER, FOR SOLUTION ORAL at 17:51

## 2019-10-16 RX ADMIN — SENNA PLUS 2 TABLET(S): 8.6 TABLET ORAL at 21:52

## 2019-10-16 RX ADMIN — ESCITALOPRAM OXALATE 10 MILLIGRAM(S): 10 TABLET, FILM COATED ORAL at 09:28

## 2019-10-16 RX ADMIN — MIRTAZAPINE 15 MILLIGRAM(S): 45 TABLET, ORALLY DISINTEGRATING ORAL at 21:52

## 2019-10-16 RX ADMIN — Medication 1 TABLET(S): at 09:27

## 2019-10-16 RX ADMIN — Medication 0.5 MILLIGRAM(S): at 21:51

## 2019-10-16 RX ADMIN — HEPARIN SODIUM 5000 UNIT(S): 5000 INJECTION INTRAVENOUS; SUBCUTANEOUS at 06:41

## 2019-10-16 RX ADMIN — SODIUM CHLORIDE 60 MILLILITER(S): 9 INJECTION INTRAMUSCULAR; INTRAVENOUS; SUBCUTANEOUS at 18:55

## 2019-10-16 NOTE — DIETITIAN INITIAL EVALUATION ADULT. - PERTINENT MEDS FT
aspirin enteric coated 81  clonazePAM  Tablet 0.5  dextrose 40% Gel 15 PRN  dextrose 5%. 1000  dextrose 50% Injectable 12.5  dextrose 50% Injectable 25  dextrose 50% Injectable 25  docusate sodium 100  escitalopram 10  glucagon  Injectable 1 PRN  heparin  Injectable 5000  insulin lispro (HumaLOG) corrective regimen sliding scale   insulin lispro (HumaLOG) corrective regimen sliding scale   mirtazapine 15  multivitamin 1  ondansetron Injectable 4 PRN  pantoprazole    Tablet 40  senna 2  sodium chloride 0.9%. 1000

## 2019-10-16 NOTE — PHYSICAL THERAPY INITIAL EVALUATION ADULT - PERTINENT HX OF CURRENT PROBLEM, REHAB EVAL
Pt is an 86y/o M h/o HLD, T2DM, pancreatitis, hernia p/w diarrhea. Pt states that he had constipation overnight and had pain when straining for bowel movement. He had diarrhea and has felt nauseous this morning and went to the ED. Possible GI eval for possible colonscopy/flex sig given 30lbs weight loss although may be related to poor po intake. Pending S&S for possible dysphagia as pt reports difficulty swallowing, soft diet.

## 2019-10-16 NOTE — PROVIDER CONTACT NOTE (OTHER) - ASSESSMENT
Patient AOx4. Patient states he is having difficulty swallowing with eating, drinking and taking meds. Patient states his throat hurts when he swallows down any solid or liquid.   RN offered to crush meds and give it with apple sauce or pudding. Patient tried little pudding and said he had difficultly swallowing and does not want to take his medication. No coughing noted.

## 2019-10-16 NOTE — PROGRESS NOTE ADULT - PROBLEM SELECTOR PLAN 5
Likely nutritional   No evidence of active bleeding.   Has been on iron supplement   previous check with TSH, B12, folate.

## 2019-10-16 NOTE — PROVIDER CONTACT NOTE (OTHER) - BACKGROUND
Dx - diarrhea, nausea, dehydration  Hx - HTN, DM2, pancreatitis Dx - diarrhea, nausea, abdominal pain, dehydration  Hx - DM2, pancreatitis, depression, squamous cell carcinoma, and anxiety

## 2019-10-16 NOTE — DIETITIAN INITIAL EVALUATION ADULT. - ADD RECOMMEND
continue consistent carbohydrate with snack, dash, [id number] liquid, recommend HgbA1c, RD will provided diet health shakes with meals, monitor renal indices and need to change supplement, monitor result of swallow evaluation continue consistent carbohydrate with snack, dash, [id number] liquid, recommend HgbA1c, RD will provided diet health shakes with meals, monitor renal indices and need to change supplement, monitor result of swallow evaluation, continue multivitamin

## 2019-10-16 NOTE — PROVIDER CONTACT NOTE (OTHER) - ACTION/TREATMENT ORDERED:
PA made aware. Patient on NS @ 60ml/hr. Will continue to monitor patient.
NP aware. Will continue to monitor patient.

## 2019-10-16 NOTE — DIETITIAN INITIAL EVALUATION ADULT. - OTHER INFO
Reason for admission : abd pain, weight loss  Diet PTA : cereal, milk, orange juice, 2 poached eggs, prunes, blueberries, strawberries for breakfast, frankfurter, hamburger, cake for lunch, fish including salmon, filet of sole, and dessert for dinner. he does not  snack. he does not follow a modified diet PTA.   Intake : he had not received a meal from hospital as of yet so average intake of hospital meals could not yet be determined   Denies nausea/vomit :  pt with upper dentures and missing teeth on the bottom. he complains of difficulty swallowing over the past couple of weeks. his throat has been sore, but denies being sick.   Last BM : yesterday  NKFA  IBW +/- 10%= 154pounds  Ht:68"  Usual Weight PTA:160pounds  BMI:20.4  Education Provided : Heart Healthy Diabetes Nutrition Therapy   skin:no pressure injury  edema: none

## 2019-10-16 NOTE — DIETITIAN INITIAL EVALUATION ADULT. - PHYSICAL APPEARANCE
underweight Patient consented to nutrition focused physical exam. Findings as follows: severe muscle wasting from temples and clavicles, severe squaring of the shoulders, severe muscle wasting from the interosseous muscles, severe muscle wasting from calf noted. Moderate fat loss from orbitals noted./underweight

## 2019-10-16 NOTE — PROGRESS NOTE ADULT - PROBLEM SELECTOR PLAN 7
Currently on remeron and lexapro for many years  ? depression contributing to loss of appetite and wt loss  if Gi causes ruled out, may consider psych

## 2019-10-16 NOTE — CONSULT NOTE ADULT - SUBJECTIVE AND OBJECTIVE BOX
88 yo m with h/o HTN, HLD, DM2, pancreatitis, basal/squamous cell carcinoma s/p Mohs surg, depression, anxiety presented from assisted living facility c/o abd pain and constipation last night. Patient is somewhat poor historian and collaborating information obtained from his daughter at bedside. Per family, patient called his son at 1 am last night reporting severe lower abd pain.  He has not had BM for few days which is not unusual as per family but not associated with pain. Patient did have BM overnight which did relieve his pain but family was concerned about overall fluid status and decided to bring him in to ED today. Patient has had very poor po intake over many months and has lost about 30 lbs over 1 year per daughter. Also reporting difficulty swallowing sec to pain at times and had seen an ENT as outpt who performed larynoscopy which was unremarkable. Patient also saw a GI given weight loss but did not recommend any colonoscopy (last colon was 10 years ago). Patient denies any melena or BRBPR. Alos reporting some "spitting up of bile at time." +nausea but denies vomiting. Patient has had slow functional decline over many months with decrease in PO, weight, poor sleep, lack of interest in activity. Has been taking Lexapro and Remeron for many years.   -----------------------------------------  Pt seen and examined  Daughter reports that patient is complaining of intermittent dysphagia however patient denies any concerns at this time.   He has lost significant amount of weight  No prior hx of EGD  He currently denies any abdominal pain  No BM yesterday or today  No fevers or chills      PAST MEDICAL & SURGICAL HISTORY:  Squamous cell carcinoma  Basal cell carcinoma of skin  Pancreatitis: years ago  Diabetes mellitus: type 2  Hyperlipidemia  Anxiety  Depression  H/O Moh's micrographic surgery for skin cancer: 18 right cheek  Renal cyst surgery in   Inguinal hernia s/p repair (L)      MEDICATIONS  (STANDING):  aspirin enteric coated 81 milliGRAM(s) Oral daily  clonazePAM  Tablet 0.5 milliGRAM(s) Oral at bedtime  dextrose 5%. 1000 milliLiter(s) (50 mL/Hr) IV Continuous <Continuous>  dextrose 50% Injectable 12.5 Gram(s) IV Push once  dextrose 50% Injectable 25 Gram(s) IV Push once  dextrose 50% Injectable 25 Gram(s) IV Push once  docusate sodium 100 milliGRAM(s) Oral three times a day  escitalopram 10 milliGRAM(s) Oral daily  heparin  Injectable 5000 Unit(s) SubCutaneous every 12 hours  insulin lispro (HumaLOG) corrective regimen sliding scale   SubCutaneous three times a day before meals  insulin lispro (HumaLOG) corrective regimen sliding scale   SubCutaneous at bedtime  mirtazapine 15 milliGRAM(s) Oral at bedtime  multivitamin 1 Tablet(s) Oral daily  pantoprazole    Tablet 40 milliGRAM(s) Oral before breakfast  senna 2 Tablet(s) Oral at bedtime  sodium chloride 0.9%. 1000 milliLiter(s) (60 mL/Hr) IV Continuous <Continuous>    MEDICATIONS  (PRN):  dextrose 40% Gel 15 Gram(s) Oral once PRN Blood Glucose LESS THAN 70 milliGRAM(s)/deciliter  glucagon  Injectable 1 milliGRAM(s) IntraMuscular once PRN Glucose LESS THAN 70 milligrams/deciliter  ondansetron Injectable 4 milliGRAM(s) IV Push every 6 hours PRN Nausea      Allergies    penicillin (Rash)  quinidine (Unknown)    Intolerances        Review of Systems:    General:  +wt loss, fevers, chills, night sweats,fatigue,   CV:  No pain, palpitatioins, hypo/hypertension  Resp:  No dyspnea, cough, tachypnea, wheezing  GI:  see hpi  :  No pain, bleeding, incontinence, nocturia  Muscle:  No pain, weakness  Neuro:  No weakness, tingling, memory problems  Psych:  No fatigue, insomnia, mood problems, depression  Endocrine:  No polyuria, polydypsia, cold/heat intolerance  Heme:  No petechiae, ecchymosis, easy bruisability  Skin:  No rash, tattoos, scars, edema      Vital Signs Last 24 Hrs  T(C): 36.9 (16 Oct 2019 12:15), Max: 37.3 (15 Oct 2019 18:33)  T(F): 98.5 (16 Oct 2019 12:15), Max: 99.1 (15 Oct 2019 18:33)  HR: 66 (16 Oct 2019 12:15) (66 - 77)  BP: 105/66 (16 Oct 2019 12:15) (95/53 - 125/60)  BP(mean): --  RR: 18 (16 Oct 2019 12:15) (17 - 22)  SpO2: 97% (16 Oct 2019 12:15) (93% - 97%)    PHYSICAL EXAM:    Constitutional: NAD, well-developed  Respiratory: CTA   Cardiovascular: S1 and S2,  Gastrointestinal: BS+, soft, NT/ND, neg HSM,  Extremities: No peripheral edema, neg clubing, cyanosis  Vascular: 2+ peripheral pulses  Neurological: A/O x 3, no focal deficits  Psychiatric: Normal mood, normal affect  Skin: No rashes      LABS:                        10.0   8.29  )-----------( 200      ( 16 Oct 2019 06:09 )             31.6                         11.3   14.11 )-----------( 261      ( 15 Oct 2019 12:47 )             35.6     10-16    140  |  103  |  14  ----------------------------<  99  3.8   |  27  |  1.25    Ca    8.6      16 Oct 2019 06:09    TPro  7.2  /  Alb  3.5  /  TBili  0.5  /  DBili  x   /  AST  15  /  ALT  11  /  AlkPhos  71  10-15      Urinalysis Basic - ( 15 Oct 2019 15:55 )    Color: Yellow / Appearance: Clear / S.033 / pH: x  Gluc: x / Ketone: Moderate  / Bili: Negative / Urobili: Negative   Blood: x / Protein: 30 mg/dL / Nitrite: Negative   Leuk Esterase: Negative / RBC: 3 /hpf / WBC 2 /HPF   Sq Epi: x / Non Sq Epi: 0 /hpf / Bacteria: Negative      LIVER FUNCTIONS - ( 15 Oct 2019 12:47 )  Alb: 3.5 g/dL / Pro: 7.2 g/dL / ALK PHOS: 71 U/L / ALT: 11 U/L / AST: 15 U/L / GGT: x           Lipase, Serum: 7 U/L (10-15-19 @ 12:47)        RADIOLOGY & ADDITIONAL TESTS:

## 2019-10-16 NOTE — PROVIDER CONTACT NOTE (OTHER) - ASSESSMENT
Patient Aox4. Resting comfortably at bed at the time of the event. VS - Patient Aox4. Resting comfortably at bed at the time of the event. Denies any distress. VS - Temp 97.4 F, /63, HR 73 (sinus), RR 18, O2 95% on room air. Patient Aox4. Resting comfortably in bed. Denies any distress. VS - Temp 97.4 F, /63, HR 73 (sinus), RR 18, O2 95% on room air.

## 2019-10-16 NOTE — PROGRESS NOTE ADULT - PROBLEM SELECTOR PLAN 2
reportedly havinf difficulty swallowing food.   Seen by ENT as outpt without findings.   reports to "spitting up bile" but no vomiting.   cont PPI, zofran PRN. ,S+S eval    soft diet and monitor for tolerance.

## 2019-10-16 NOTE — PHYSICAL THERAPY INITIAL EVALUATION ADULT - ADDITIONAL COMMENTS
PTA pt lived at the [facility], mostly independent without AD, is provided with meals/medications, has been having someone assist him with showering.

## 2019-10-16 NOTE — CHART NOTE - NSCHARTNOTEFT_GEN_A_CORE
Upon Nutritional Assessment by the Registered Dietitian your patient was determined to meet criteria / has evidence of the following diagnosis/diagnoses:          [ ]  Mild Protein Calorie Malnutrition        [ ]  Moderate Protein Calorie Malnutrition        [x ] Severe Protein Calorie Malnutrition        [ ] Unspecified Protein Calorie Malnutrition        [ ] Underweight / BMI <19        [ ] Morbid Obesity / BMI > 40      Findings as based on:  [x ] Comprehensive nutrition assessment   [x ] Nutrition Focused Physical Exam: Patient consented to nutrition focused physical exam. Findings as follows: severe muscle wasting from temples and clavicles, severe squaring of the shoulders, severe muscle wasting from the interosseous muscles, severe muscle wasting from calf noted. Moderate fat loss from orbitals noted.   [ ] Other:       Nutrition Plan/Recommendations:    continue consistent carbohydrate with snack, dash, [id number] liquid, recommend HgbA1c, RD will provide diet health shakes with meals, monitor renal indices and need to change supplement, monitor result of swallow evaluation      PROVIDER Section:     By signing this assessment you are acknowledging and agree with the diagnosis/diagnoses assigned by the Registered Dietitian    Comments: Upon Nutritional Assessment by the Registered Dietitian your patient was determined to meet criteria / has evidence of the following diagnosis/diagnoses:          [ ]  Mild Protein Calorie Malnutrition        [ ]  Moderate Protein Calorie Malnutrition        [x ] Severe Protein Calorie Malnutrition        [ ] Unspecified Protein Calorie Malnutrition        [ ] Underweight / BMI <19        [ ] Morbid Obesity / BMI > 40      Findings as based on:  [x ] Comprehensive nutrition assessment   [x ] Nutrition Focused Physical Exam: Patient consented to nutrition focused physical exam. Findings as follows: severe muscle wasting from temples and clavicles, severe squaring of the shoulders, severe muscle wasting from the interosseous muscles, severe muscle wasting from calf noted. Moderate fat loss from orbitals noted.   [ ] Other:       Nutrition Plan/Recommendations:    continue consistent carbohydrate with snack, dash, [id number] liquid, recommend HgbA1c, RD will provide diet health shakes with meals, monitor renal indices and need to change supplement, monitor result of swallow evaluation, continue multivitamin      PROVIDER Section:     By signing this assessment you are acknowledging and agree with the diagnosis/diagnoses assigned by the Registered Dietitian    Comments:

## 2019-10-16 NOTE — PROGRESS NOTE ADULT - PROBLEM SELECTOR PLAN 1
Abd pain resolved after BM last night.   CT with evidence of thickened rectum.  Patient denies any diarrhea or fever.   Suspect related to constipation? elevated WBC. will monitor.    GI cs fro constipation/ 30lbs weight loss /dysphagia  Cont with bowel regiment.   Monitor off abx

## 2019-10-16 NOTE — DIETITIAN INITIAL EVALUATION ADULT. - PERTINENT LABORATORY DATA
Na 140, K+ 3.8, BUN 14, Cr 1.25, BG 99      CAPILLARY BLOOD GLUCOSE      POCT Blood Glucose.: 110 mg/dL (15 Oct 2019 22:08)

## 2019-10-17 LAB
ANION GAP SERPL CALC-SCNC: 17 MMOL/L — SIGNIFICANT CHANGE UP (ref 5–17)
BUN SERPL-MCNC: 12 MG/DL — SIGNIFICANT CHANGE UP (ref 7–23)
CALCIUM SERPL-MCNC: 8.9 MG/DL — SIGNIFICANT CHANGE UP (ref 8.4–10.5)
CHLORIDE SERPL-SCNC: 100 MMOL/L — SIGNIFICANT CHANGE UP (ref 96–108)
CO2 SERPL-SCNC: 23 MMOL/L — SIGNIFICANT CHANGE UP (ref 22–31)
CREAT SERPL-MCNC: 1.06 MG/DL — SIGNIFICANT CHANGE UP (ref 0.5–1.3)
GLUCOSE BLDC GLUCOMTR-MCNC: 109 MG/DL — HIGH (ref 70–99)
GLUCOSE BLDC GLUCOMTR-MCNC: 139 MG/DL — HIGH (ref 70–99)
GLUCOSE BLDC GLUCOMTR-MCNC: 142 MG/DL — HIGH (ref 70–99)
GLUCOSE BLDC GLUCOMTR-MCNC: 158 MG/DL — HIGH (ref 70–99)
GLUCOSE SERPL-MCNC: 106 MG/DL — HIGH (ref 70–99)
HBA1C BLD-MCNC: 5.2 % — SIGNIFICANT CHANGE UP (ref 4–5.6)
HCT VFR BLD CALC: 34.7 % — LOW (ref 39–50)
HGB BLD-MCNC: 10.8 G/DL — LOW (ref 13–17)
MAGNESIUM SERPL-MCNC: 1.8 MG/DL — SIGNIFICANT CHANGE UP (ref 1.6–2.6)
MCHC RBC-ENTMCNC: 31.1 GM/DL — LOW (ref 32–36)
MCHC RBC-ENTMCNC: 31.7 PG — SIGNIFICANT CHANGE UP (ref 27–34)
MCV RBC AUTO: 101.8 FL — HIGH (ref 80–100)
NRBC # BLD: 0 /100 WBCS — SIGNIFICANT CHANGE UP (ref 0–0)
PHOSPHATE SERPL-MCNC: 2.2 MG/DL — LOW (ref 2.5–4.5)
PLATELET # BLD AUTO: 220 K/UL — SIGNIFICANT CHANGE UP (ref 150–400)
POTASSIUM SERPL-MCNC: 3.9 MMOL/L — SIGNIFICANT CHANGE UP (ref 3.5–5.3)
POTASSIUM SERPL-SCNC: 3.9 MMOL/L — SIGNIFICANT CHANGE UP (ref 3.5–5.3)
RBC # BLD: 3.41 M/UL — LOW (ref 4.2–5.8)
RBC # FLD: 13.7 % — SIGNIFICANT CHANGE UP (ref 10.3–14.5)
SODIUM SERPL-SCNC: 140 MMOL/L — SIGNIFICANT CHANGE UP (ref 135–145)
WBC # BLD: 7.83 K/UL — SIGNIFICANT CHANGE UP (ref 3.8–10.5)
WBC # FLD AUTO: 7.83 K/UL — SIGNIFICANT CHANGE UP (ref 3.8–10.5)

## 2019-10-17 RX ADMIN — MIRTAZAPINE 15 MILLIGRAM(S): 45 TABLET, ORALLY DISINTEGRATING ORAL at 21:28

## 2019-10-17 RX ADMIN — POLYETHYLENE GLYCOL 3350 17 GRAM(S): 17 POWDER, FOR SOLUTION ORAL at 17:56

## 2019-10-17 RX ADMIN — SENNA PLUS 2 TABLET(S): 8.6 TABLET ORAL at 21:28

## 2019-10-17 RX ADMIN — PANTOPRAZOLE SODIUM 40 MILLIGRAM(S): 20 TABLET, DELAYED RELEASE ORAL at 05:19

## 2019-10-17 RX ADMIN — POLYETHYLENE GLYCOL 3350 17 GRAM(S): 17 POWDER, FOR SOLUTION ORAL at 05:20

## 2019-10-17 RX ADMIN — HEPARIN SODIUM 5000 UNIT(S): 5000 INJECTION INTRAVENOUS; SUBCUTANEOUS at 17:56

## 2019-10-17 RX ADMIN — Medication 100 MILLIGRAM(S): at 05:20

## 2019-10-17 RX ADMIN — Medication 100 MILLIGRAM(S): at 21:28

## 2019-10-17 RX ADMIN — Medication 81 MILLIGRAM(S): at 09:06

## 2019-10-17 RX ADMIN — Medication 1 TABLET(S): at 09:06

## 2019-10-17 RX ADMIN — ESCITALOPRAM OXALATE 10 MILLIGRAM(S): 10 TABLET, FILM COATED ORAL at 09:06

## 2019-10-17 RX ADMIN — HEPARIN SODIUM 5000 UNIT(S): 5000 INJECTION INTRAVENOUS; SUBCUTANEOUS at 05:19

## 2019-10-17 RX ADMIN — Medication 100 MILLIGRAM(S): at 13:04

## 2019-10-17 RX ADMIN — Medication 1: at 13:04

## 2019-10-17 RX ADMIN — Medication 0.5 MILLIGRAM(S): at 21:28

## 2019-10-17 NOTE — SWALLOW BEDSIDE ASSESSMENT ADULT - SLP PERTINENT HISTORY OF CURRENT PROBLEM
86 yo m with h/o HTN, HLD, DM2, pancreatitis, basal/squamous cell carcinoma s/p Mohs surg, depression, anxiety presented from assisted living facility c/o abd pain and constipation last night. Patient is somewhat poor historian and collaborating information obtained from his daughter at bedside. Per family, patient called his son at 1 am last night reporting severe lower abd pain.  He has not had BM for few days which is not unusual as per family but not associated with pain. Patient did have BM overnight which did relieve his pain but family was concerned about overall fluid status and decided to bring him in to ED today. Patient has had very poor po intake over many months and has lost about 30 lbs over 1 year per daughter. Also reporting difficulty swallowing sec to pain at times and had seen an ENT as outpt who performed larynoscopy which was unremarkable.

## 2019-10-17 NOTE — SWALLOW BEDSIDE ASSESSMENT ADULT - COMMENTS
Patient also saw a GI given weight loss but did not recommend any colonoscopy (last colon was 10 years ago). Patient denies any melena or BRBPR. Also reporting some "spitting up of bile at time." +nausea but denies vomiting. Patient has had slow functional decline over many months with decrease in PO, weight, poor sleep, lack of interest in activity. Has been taking Lexapro and Remeron for many years. Consider GI eval for possible colonscopy/flex sig given 30lbs weight loss although may be related to poor po intake. ?odynophagia; Will start on PPI, zofran PRN; S+S eval; Will give soft diet and monitor for tolerance. Per RN on 10/16: Patient states he is having difficulty swallowing with eating, drinking and taking meds. Patient states his throat hurts when he swallows down any solid or liquid. RN offered to crush meds and give it with apple sauce or pudding. Patient tried little pudding and said he had difficulty swallowing and does not want to take his medication. No coughing noted.

## 2019-10-17 NOTE — SWALLOW BEDSIDE ASSESSMENT ADULT - ADDITIONAL RECOMMENDATIONS
Maintain good oral hygiene.  This service to f/u tomorrow post GI w/u to determine if MBS is still indicated.

## 2019-10-17 NOTE — SWALLOW BEDSIDE ASSESSMENT ADULT - SLP GENERAL OBSERVATIONS
Patient encountered awake and alert, OOB in chair, on RA, A&Ox4, daughters present at bedside.  Able to follow commands for evaluation purposes and make wants/needs known.  Vocal quality somewhat weak, but judged to be WNL.

## 2019-10-17 NOTE — SWALLOW BEDSIDE ASSESSMENT ADULT - SWALLOW EVAL: RECOMMENDED DIET
A pureed diet was recommended given s/s suggestive of laryngeal penetration/aspiration post intake of solids and pt report of increased difficulty swallowing solids, however, pt/daughter refused modified diet at this time - VIRIDIANA Tadeo informed.

## 2019-10-17 NOTE — SWALLOW BEDSIDE ASSESSMENT ADULT - ASR SWALLOW RECOMMEND DIAG
VFSS/MBS/Recommend MBS to further assess the swallow mechanism.  Per discussion with PA and pt's daughter, pt pending GI w/u tomorrow.  Exam to be scheduled for 10/18.  As per discussion with daughter, MBS will be deferred if GI w/u determines etiology of dysphagia.

## 2019-10-17 NOTE — PROGRESS NOTE ADULT - PROBLEM SELECTOR PLAN 1
no abd pain at this time  CT with evidence of thickened rectum  Suspect related to constipation  appreciate GI recs, plan for EGD flex sig for constipation/ 30lbs weight loss/dysphagia  Cont with bowel regimen  Monitor off abx

## 2019-10-17 NOTE — SWALLOW BEDSIDE ASSESSMENT ADULT - SWALLOW EVAL: DIAGNOSIS
Patient presents with suspected pharyngeal dysphagia characterized by multiple swallows per bolus with may be indicative of pharyngeal stasis and throat clearing/coughing post intake of solids.

## 2019-10-17 NOTE — SWALLOW BEDSIDE ASSESSMENT ADULT - SWALLOW EVAL: PATIENT/FAMILY GOALS STATEMENT
Patient's daughter, Irasema, at bedside.  Pt/family endorsed significant weight loss over the past year.  Daughter stated pt has poor PO intake overall and has not been eating much.  Pt endorsed feeling as if "something" is stuck in his throat during intake - stated it is worse with solids than liquids/purees.  As per RN report pt consumed breakfast and PO meds, however, did appear to have some difficulty swallowing pills. Per daughter's report pt had unremarkable laryngoscopy with an ENT in August and was pending further w/u re: reflux.

## 2019-10-18 ENCOUNTER — RESULT REVIEW (OUTPATIENT)
Age: 84
End: 2019-10-18

## 2019-10-18 LAB
ANION GAP SERPL CALC-SCNC: 14 MMOL/L — SIGNIFICANT CHANGE UP (ref 5–17)
BUN SERPL-MCNC: 12 MG/DL — SIGNIFICANT CHANGE UP (ref 7–23)
CALCIUM SERPL-MCNC: 8.7 MG/DL — SIGNIFICANT CHANGE UP (ref 8.4–10.5)
CHLORIDE SERPL-SCNC: 100 MMOL/L — SIGNIFICANT CHANGE UP (ref 96–108)
CO2 SERPL-SCNC: 25 MMOL/L — SIGNIFICANT CHANGE UP (ref 22–31)
CREAT SERPL-MCNC: 1.19 MG/DL — SIGNIFICANT CHANGE UP (ref 0.5–1.3)
GLUCOSE BLDC GLUCOMTR-MCNC: 113 MG/DL — HIGH (ref 70–99)
GLUCOSE BLDC GLUCOMTR-MCNC: 115 MG/DL — HIGH (ref 70–99)
GLUCOSE BLDC GLUCOMTR-MCNC: 125 MG/DL — HIGH (ref 70–99)
GLUCOSE BLDC GLUCOMTR-MCNC: 159 MG/DL — HIGH (ref 70–99)
GLUCOSE BLDC GLUCOMTR-MCNC: 180 MG/DL — HIGH (ref 70–99)
GLUCOSE SERPL-MCNC: 121 MG/DL — HIGH (ref 70–99)
HCT VFR BLD CALC: 34.6 % — LOW (ref 39–50)
HGB BLD-MCNC: 10.8 G/DL — LOW (ref 13–17)
MCHC RBC-ENTMCNC: 31.2 GM/DL — LOW (ref 32–36)
MCHC RBC-ENTMCNC: 31.5 PG — SIGNIFICANT CHANGE UP (ref 27–34)
MCV RBC AUTO: 100.9 FL — HIGH (ref 80–100)
NRBC # BLD: 0 /100 WBCS — SIGNIFICANT CHANGE UP (ref 0–0)
PLATELET # BLD AUTO: 218 K/UL — SIGNIFICANT CHANGE UP (ref 150–400)
POTASSIUM SERPL-MCNC: 3.9 MMOL/L — SIGNIFICANT CHANGE UP (ref 3.5–5.3)
POTASSIUM SERPL-SCNC: 3.9 MMOL/L — SIGNIFICANT CHANGE UP (ref 3.5–5.3)
RBC # BLD: 3.43 M/UL — LOW (ref 4.2–5.8)
RBC # FLD: 13.6 % — SIGNIFICANT CHANGE UP (ref 10.3–14.5)
SODIUM SERPL-SCNC: 139 MMOL/L — SIGNIFICANT CHANGE UP (ref 135–145)
WBC # BLD: 9.84 K/UL — SIGNIFICANT CHANGE UP (ref 3.8–10.5)
WBC # FLD AUTO: 9.84 K/UL — SIGNIFICANT CHANGE UP (ref 3.8–10.5)

## 2019-10-18 PROCEDURE — 88305 TISSUE EXAM BY PATHOLOGIST: CPT | Mod: 26

## 2019-10-18 PROCEDURE — 88312 SPECIAL STAINS GROUP 1: CPT | Mod: 26

## 2019-10-18 PROCEDURE — 74230 X-RAY XM SWLNG FUNCJ C+: CPT | Mod: 26

## 2019-10-18 RX ADMIN — Medication 81 MILLIGRAM(S): at 10:50

## 2019-10-18 RX ADMIN — MIRTAZAPINE 15 MILLIGRAM(S): 45 TABLET, ORALLY DISINTEGRATING ORAL at 21:04

## 2019-10-18 RX ADMIN — Medication 100 MILLIGRAM(S): at 21:04

## 2019-10-18 RX ADMIN — Medication 1 TABLET(S): at 10:50

## 2019-10-18 RX ADMIN — SENNA PLUS 2 TABLET(S): 8.6 TABLET ORAL at 21:04

## 2019-10-18 RX ADMIN — Medication 1: at 18:18

## 2019-10-18 RX ADMIN — POLYETHYLENE GLYCOL 3350 17 GRAM(S): 17 POWDER, FOR SOLUTION ORAL at 18:54

## 2019-10-18 RX ADMIN — HEPARIN SODIUM 5000 UNIT(S): 5000 INJECTION INTRAVENOUS; SUBCUTANEOUS at 18:54

## 2019-10-18 RX ADMIN — Medication 100 MILLIGRAM(S): at 14:30

## 2019-10-18 RX ADMIN — Medication 0.5 MILLIGRAM(S): at 21:04

## 2019-10-18 RX ADMIN — ESCITALOPRAM OXALATE 10 MILLIGRAM(S): 10 TABLET, FILM COATED ORAL at 10:50

## 2019-10-18 NOTE — SWALLOW VFSS/MBS ASSESSMENT ADULT - PHARYNGEAL PHASE COMMENTS
A chin tuck and small single sips were not effective in maintaining airway protection.  Although difficult to visualize the point of entry during following aspiration events, increased amount of contrast is not on the posterior tracheal wall post intake of thin liquids with use of strategies.

## 2019-10-18 NOTE — PROGRESS NOTE ADULT - PROBLEM SELECTOR PLAN 1
no abd pain at this time  CT with evidence of thickened rectum  Suspect related to constipation  appreciate GI recs sp egd and flex sig mild candidal esophagitis, small esophageal ulcer, 2 large rectal ulcer, sterocoral colitis  Cont with bowel regimen with miralax bid  high fiber diet

## 2019-10-18 NOTE — SWALLOW VFSS/MBS ASSESSMENT ADULT - RECOMMENDED FEEDING/EATING TECHNIQUES
alternate food with liquid/crush medication (when feasible)/allow for swallow between intakes/small sips/bites

## 2019-10-18 NOTE — SWALLOW VFSS/MBS ASSESSMENT ADULT - ORAL PHASE COMMENTS
Max spillage to the valleculae. Mild oral residue in the visualized portion of the oral cavity which reduces with liquid wash. Max spillage to the valleculae with passive spillover along the AE folds to the pyriform sinus. Mild amount of laryngeal penetration before the swallow - appears to come over the arytenoids/AE folds. Max spillage to the valleculae.

## 2019-10-18 NOTE — SWALLOW VFSS/MBS ASSESSMENT ADULT - DIAGNOSTIC IMPRESSIONS
Patient presents with oropharyngeal dysphagia characterized by premature spillage into the pharynx prior to airway closure most notable with less viscous fluids, delayed trigger of the swallow, and aspiration of thin liquids.  One episode of aspiration resulted in a cough response, while other episodes were silent.  A chin tuck was ineffective in maintaining airway protection during intake of thin liquids. Patient presents with oropharyngeal dysphagia characterized by delayed oral transit time, premature spillage into the pharynx prior to airway closure most notable with less viscous fluids, delayed trigger of the swallow, and aspiration of thin liquids.  One episode of aspiration resulted in a cough response, while other episodes were silent.  A chin tuck and small single sips were ineffective in maintaining airway protection during intake of thin liquids.

## 2019-10-18 NOTE — SWALLOW VFSS/MBS ASSESSMENT ADULT - SLP GENERAL OBSERVATIONS
Patient received awake and alert, upright and secure in KARLOS chair, on RA.  Did not consistently follow commands for evaluation purposes.  C/o satiety and not wanting to eat/drink anymore after limited trials.

## 2019-10-18 NOTE — PROGRESS NOTE ADULT - PROBLEM SELECTOR PLAN 7
Currently on remeron and lexapro for many years  ? depression contributing to loss of appetite and wt loss  outpt fu

## 2019-10-18 NOTE — SWALLOW VFSS/MBS ASSESSMENT ADULT - SLP PERTINENT HISTORY OF CURRENT PROBLEM
88 yo m with h/o HTN, HLD, DM2, pancreatitis, basal/squamous cell carcinoma s/p Mohs surg, depression, anxiety presented from assisted living facility c/o abd pain and constipation last night. Patient is somewhat poor historian and collaborating information obtained from his daughter at bedside. Per family, patient called his son at 1 am last night reporting severe lower abd pain.  He has not had BM for few days which is not unusual as per family but not associated with pain. Patient did have BM overnight which did relieve his pain but family was concerned about overall fluid status and decided to bring him in to ED today. Patient has had very poor po intake over many months and has lost about 30 lbs over 1 year per daughter. Also reporting difficulty swallowing sec to pain at times and had seen an ENT as outpt who performed larynoscopy which was unremarkable.

## 2019-10-18 NOTE — SWALLOW VFSS/MBS ASSESSMENT ADULT - ASPIRATION DURING THE SWALLOW - COUGH
Trace/Mild/pt with immediate cough response to first episode of aspiration; material noted along the anterior and posterior tracheal walls despite cough

## 2019-10-18 NOTE — SWALLOW VFSS/MBS ASSESSMENT ADULT - NS SWALLOW VFSS REC ASPIR MON
change of breathing pattern/cough/gurgly voice/fever/pneumonia/throat clearing/upper respiratory infection/Monitor for s/s aspiration/laryngeal penetration. If noted:  D/C p.o. intake, provide non-oral nutrition/hydration/meds, and contact this service @ [phone]

## 2019-10-18 NOTE — SWALLOW VFSS/MBS ASSESSMENT ADULT - ROSENBEK'S PENETRATION ASPIRATION SCALE
(8) contrast passes glottis, visible subglottic residue remains, absent patient response (aspiration) (1) no aspiration, contrast does not enter airway

## 2019-10-18 NOTE — PROGRESS NOTE ADULT - PROBLEM SELECTOR PROBLEM 7
----- Message from Calli Huang MD sent at 1/16/2017  6:37 PM CST -----  Vit D improved. Cont current supplementation. Chol and LDL stable but TG improved. Cont to work on weight loss. FBS slightly high. Cont to work on healthy diet changes, weight loss. Kidney and liver fxn fine.   Depression, unspecified depression type

## 2019-10-18 NOTE — SWALLOW VFSS/MBS ASSESSMENT ADULT - ADDITIONAL RECOMMENDATIONS
Maintain good oral hygiene.  Trial of restorative swallow therapy prior to repeat MBS at discretion of treating SLP.

## 2019-10-18 NOTE — SWALLOW VFSS/MBS ASSESSMENT ADULT - ORAL PHASE
Incomplete tongue to palate contact/Delayed oral transit time/Uncontrolled bolus / spillover in demetris-pharynx Uncontrolled bolus / spillover in demetris-pharynx/Delayed oral transit time/Incomplete tongue to palate contact Delayed oral transit time/Uncontrolled bolus / spillover in demetris-pharynx/Laryngeal penetration before swallow - silent/Incomplete tongue to palate contact/Uncontrolled bolus / spillover in hypopharynx

## 2019-10-19 LAB
GLUCOSE BLDC GLUCOMTR-MCNC: 109 MG/DL — HIGH (ref 70–99)
GLUCOSE BLDC GLUCOMTR-MCNC: 115 MG/DL — HIGH (ref 70–99)
GLUCOSE BLDC GLUCOMTR-MCNC: 120 MG/DL — HIGH (ref 70–99)
GLUCOSE BLDC GLUCOMTR-MCNC: 141 MG/DL — HIGH (ref 70–99)
HCT VFR BLD CALC: 34.3 % — LOW (ref 39–50)
HGB BLD-MCNC: 10.7 G/DL — LOW (ref 13–17)
MCHC RBC-ENTMCNC: 31.2 GM/DL — LOW (ref 32–36)
MCHC RBC-ENTMCNC: 31.4 PG — SIGNIFICANT CHANGE UP (ref 27–34)
MCV RBC AUTO: 100.6 FL — HIGH (ref 80–100)
NRBC # BLD: 0 /100 WBCS — SIGNIFICANT CHANGE UP (ref 0–0)
PLATELET # BLD AUTO: 212 K/UL — SIGNIFICANT CHANGE UP (ref 150–400)
RBC # BLD: 3.41 M/UL — LOW (ref 4.2–5.8)
RBC # FLD: 13.5 % — SIGNIFICANT CHANGE UP (ref 10.3–14.5)
WBC # BLD: 12.24 K/UL — HIGH (ref 3.8–10.5)
WBC # FLD AUTO: 12.24 K/UL — HIGH (ref 3.8–10.5)

## 2019-10-19 RX ADMIN — Medication 100 MILLIGRAM(S): at 13:09

## 2019-10-19 RX ADMIN — PANTOPRAZOLE SODIUM 40 MILLIGRAM(S): 20 TABLET, DELAYED RELEASE ORAL at 05:29

## 2019-10-19 RX ADMIN — Medication 81 MILLIGRAM(S): at 11:44

## 2019-10-19 RX ADMIN — Medication 0.5 MILLIGRAM(S): at 21:15

## 2019-10-19 RX ADMIN — POLYETHYLENE GLYCOL 3350 17 GRAM(S): 17 POWDER, FOR SOLUTION ORAL at 17:58

## 2019-10-19 RX ADMIN — MIRTAZAPINE 15 MILLIGRAM(S): 45 TABLET, ORALLY DISINTEGRATING ORAL at 21:14

## 2019-10-19 RX ADMIN — Medication 100 MILLIGRAM(S): at 21:15

## 2019-10-19 RX ADMIN — SENNA PLUS 2 TABLET(S): 8.6 TABLET ORAL at 21:14

## 2019-10-19 RX ADMIN — Medication 1 TABLET(S): at 11:44

## 2019-10-19 RX ADMIN — ESCITALOPRAM OXALATE 10 MILLIGRAM(S): 10 TABLET, FILM COATED ORAL at 11:44

## 2019-10-19 RX ADMIN — Medication 100 MILLIGRAM(S): at 05:20

## 2019-10-19 RX ADMIN — HEPARIN SODIUM 5000 UNIT(S): 5000 INJECTION INTRAVENOUS; SUBCUTANEOUS at 05:19

## 2019-10-19 RX ADMIN — HEPARIN SODIUM 5000 UNIT(S): 5000 INJECTION INTRAVENOUS; SUBCUTANEOUS at 17:58

## 2019-10-19 RX ADMIN — POLYETHYLENE GLYCOL 3350 17 GRAM(S): 17 POWDER, FOR SOLUTION ORAL at 05:20

## 2019-10-20 LAB
GLUCOSE BLDC GLUCOMTR-MCNC: 114 MG/DL — HIGH (ref 70–99)
GLUCOSE BLDC GLUCOMTR-MCNC: 131 MG/DL — HIGH (ref 70–99)
GLUCOSE BLDC GLUCOMTR-MCNC: 135 MG/DL — HIGH (ref 70–99)
GLUCOSE BLDC GLUCOMTR-MCNC: 142 MG/DL — HIGH (ref 70–99)
HCT VFR BLD CALC: 32.9 % — LOW (ref 39–50)
HGB BLD-MCNC: 10.3 G/DL — LOW (ref 13–17)
MCHC RBC-ENTMCNC: 31.3 GM/DL — LOW (ref 32–36)
MCHC RBC-ENTMCNC: 31.6 PG — SIGNIFICANT CHANGE UP (ref 27–34)
MCV RBC AUTO: 100.9 FL — HIGH (ref 80–100)
NRBC # BLD: 0 /100 WBCS — SIGNIFICANT CHANGE UP (ref 0–0)
PLATELET # BLD AUTO: 208 K/UL — SIGNIFICANT CHANGE UP (ref 150–400)
RBC # BLD: 3.26 M/UL — LOW (ref 4.2–5.8)
RBC # FLD: 13.6 % — SIGNIFICANT CHANGE UP (ref 10.3–14.5)
WBC # BLD: 9.44 K/UL — SIGNIFICANT CHANGE UP (ref 3.8–10.5)
WBC # FLD AUTO: 9.44 K/UL — SIGNIFICANT CHANGE UP (ref 3.8–10.5)

## 2019-10-20 RX ADMIN — Medication 100 MILLIGRAM(S): at 14:10

## 2019-10-20 RX ADMIN — HEPARIN SODIUM 5000 UNIT(S): 5000 INJECTION INTRAVENOUS; SUBCUTANEOUS at 16:44

## 2019-10-20 RX ADMIN — MIRTAZAPINE 15 MILLIGRAM(S): 45 TABLET, ORALLY DISINTEGRATING ORAL at 21:14

## 2019-10-20 RX ADMIN — HEPARIN SODIUM 5000 UNIT(S): 5000 INJECTION INTRAVENOUS; SUBCUTANEOUS at 05:54

## 2019-10-20 RX ADMIN — ESCITALOPRAM OXALATE 10 MILLIGRAM(S): 10 TABLET, FILM COATED ORAL at 11:44

## 2019-10-20 RX ADMIN — Medication 81 MILLIGRAM(S): at 11:43

## 2019-10-20 RX ADMIN — Medication 100 MILLIGRAM(S): at 05:54

## 2019-10-20 RX ADMIN — Medication 1 TABLET(S): at 11:43

## 2019-10-20 RX ADMIN — Medication 0.5 MILLIGRAM(S): at 21:14

## 2019-10-20 RX ADMIN — PANTOPRAZOLE SODIUM 40 MILLIGRAM(S): 20 TABLET, DELAYED RELEASE ORAL at 05:54

## 2019-10-20 RX ADMIN — POLYETHYLENE GLYCOL 3350 17 GRAM(S): 17 POWDER, FOR SOLUTION ORAL at 05:53

## 2019-10-21 ENCOUNTER — TRANSCRIPTION ENCOUNTER (OUTPATIENT)
Age: 84
End: 2019-10-21

## 2019-10-21 VITALS
RESPIRATION RATE: 18 BRPM | OXYGEN SATURATION: 95 % | HEART RATE: 87 BPM | SYSTOLIC BLOOD PRESSURE: 126 MMHG | TEMPERATURE: 99 F | DIASTOLIC BLOOD PRESSURE: 69 MMHG

## 2019-10-21 LAB
ANION GAP SERPL CALC-SCNC: 16 MMOL/L — SIGNIFICANT CHANGE UP (ref 5–17)
BUN SERPL-MCNC: 16 MG/DL — SIGNIFICANT CHANGE UP (ref 7–23)
CALCIUM SERPL-MCNC: 8.1 MG/DL — LOW (ref 8.4–10.5)
CHLORIDE SERPL-SCNC: 98 MMOL/L — SIGNIFICANT CHANGE UP (ref 96–108)
CO2 SERPL-SCNC: 29 MMOL/L — SIGNIFICANT CHANGE UP (ref 22–31)
CREAT SERPL-MCNC: 1.22 MG/DL — SIGNIFICANT CHANGE UP (ref 0.5–1.3)
GLUCOSE BLDC GLUCOMTR-MCNC: 105 MG/DL — HIGH (ref 70–99)
GLUCOSE BLDC GLUCOMTR-MCNC: 123 MG/DL — HIGH (ref 70–99)
GLUCOSE SERPL-MCNC: 116 MG/DL — HIGH (ref 70–99)
NON-GYNECOLOGICAL CYTOLOGY STUDY: SIGNIFICANT CHANGE UP
POTASSIUM SERPL-MCNC: 3.4 MMOL/L — LOW (ref 3.5–5.3)
POTASSIUM SERPL-SCNC: 3.4 MMOL/L — LOW (ref 3.5–5.3)
SODIUM SERPL-SCNC: 143 MMOL/L — SIGNIFICANT CHANGE UP (ref 135–145)

## 2019-10-21 PROCEDURE — 99238 HOSP IP/OBS DSCHRG MGMT 30/<: CPT

## 2019-10-21 RX ORDER — DOCUSATE SODIUM 100 MG
1 CAPSULE ORAL
Qty: 0 | Refills: 0 | DISCHARGE

## 2019-10-21 RX ORDER — PANTOPRAZOLE SODIUM 20 MG/1
40 TABLET, DELAYED RELEASE ORAL
Qty: 0 | Refills: 0 | DISCHARGE
Start: 2019-10-21

## 2019-10-21 RX ORDER — POTASSIUM CHLORIDE 20 MEQ
40 PACKET (EA) ORAL ONCE
Refills: 0 | Status: COMPLETED | OUTPATIENT
Start: 2019-10-21 | End: 2019-10-21

## 2019-10-21 RX ORDER — PANTOPRAZOLE SODIUM 20 MG/1
40 TABLET, DELAYED RELEASE ORAL DAILY
Refills: 0 | Status: DISCONTINUED | OUTPATIENT
Start: 2019-10-21 | End: 2019-10-21

## 2019-10-21 RX ORDER — POLYETHYLENE GLYCOL 3350 17 G/17G
17 POWDER, FOR SOLUTION ORAL
Qty: 0 | Refills: 0 | DISCHARGE
Start: 2019-10-21

## 2019-10-21 RX ORDER — PSYLLIUM SEED (WITH DEXTROSE)
1 POWDER (GRAM) ORAL
Qty: 0 | Refills: 0 | DISCHARGE

## 2019-10-21 RX ORDER — DOCUSATE SODIUM 100 MG
1 CAPSULE ORAL
Qty: 0 | Refills: 0 | DISCHARGE
Start: 2019-10-21

## 2019-10-21 RX ADMIN — Medication 100 MILLIGRAM(S): at 11:12

## 2019-10-21 RX ADMIN — Medication 1 TABLET(S): at 11:12

## 2019-10-21 RX ADMIN — Medication 81 MILLIGRAM(S): at 11:11

## 2019-10-21 RX ADMIN — HEPARIN SODIUM 5000 UNIT(S): 5000 INJECTION INTRAVENOUS; SUBCUTANEOUS at 05:52

## 2019-10-21 RX ADMIN — ESCITALOPRAM OXALATE 10 MILLIGRAM(S): 10 TABLET, FILM COATED ORAL at 11:12

## 2019-10-21 RX ADMIN — PANTOPRAZOLE SODIUM 40 MILLIGRAM(S): 20 TABLET, DELAYED RELEASE ORAL at 11:12

## 2019-10-21 RX ADMIN — Medication 40 MILLIEQUIVALENT(S): at 12:46

## 2019-10-21 NOTE — PROGRESS NOTE ADULT - PROVIDER SPECIALTY LIST ADULT
Cardiology
Cardiology
Gastroenterology
Gastroenterology
Internal Medicine
Gastroenterology

## 2019-10-21 NOTE — DISCHARGE NOTE PROVIDER - NSDCCPCAREPLAN_GEN_ALL_CORE_FT
PRINCIPAL DISCHARGE DIAGNOSIS  Diagnosis: Proctitis  Assessment and Plan of Treatment: continue laxatives  Follow up with GI for pathology results      SECONDARY DISCHARGE DIAGNOSES  Diagnosis: Dysphagia, unspecified type  Assessment and Plan of Treatment: continue soft food with thickened liquids  continue swallow therapy    Diagnosis: Troponin level elevated  Assessment and Plan of Treatment: No chest pain, No ACS    Diagnosis: Type 2 diabetes mellitus without complication, without long-term current use of insulin  Assessment and Plan of Treatment: HgA1C this admission.  Make sure you get your HgA1c checked every three months.  If you take oral diabetes medications, check your blood glucose two times a day.  If you take insulin, check your blood glucose before meals and at bedtime.  It's important not to skip any meals.  Keep a log of your blood glucose results and always take it with you to your doctor appointments.  Keep a list of your current medications including injectables and over the counter medications and bring this medication list with you to all your doctor appointments.  If you have not seen your ophthalmologist this year call for appointment.  Check your feet daily for redness, sores, or openings. Do not self treat. If no improvement in two days call your primary care physician for an appointment.  Low blood sugar (hypoglycemia) is a blood sugar below 70mg/dl. Check your blood sugar if you feel signs/symptoms of hypoglycemia. If your blood sugar is below 70 take 15 grams of carbohydrates (ex 4 oz of apple juice, 3-4 glucose tablets, or 4-6 oz of regular soda) wait 15 minutes and repeat blood sugar to make sure it comes up above 70.  If your blood sugar is above 70 and you are due for a meal, have a meal.  If you are not due for a meal have a snack.  This snack helps keeps your blood sugar at a safe range.      Diagnosis: Depression, unspecified depression type  Assessment and Plan of Treatment: continue lexapro and remeron    Diagnosis: CKD (chronic kidney disease) stage 3, GFR 30-59 ml/min  Assessment and Plan of Treatment: Avoid taking (NSAIDs) - (ex: Ibuprofen, Advil, Celebrex, Naprosyn)  Avoid taking any nephrotoxic agents (can harm kidneys) - Intravenous contrast for diagnostic testing, combination cold medications.  Have all medications adjusted for your renal function by your Health Care Provider.  Blood pressure control is important.  Take all medication as prescribed.

## 2019-10-21 NOTE — PROGRESS NOTE ADULT - ASSESSMENT
88 yo M w/ reported dysphagia, weight loss, lower abd pain and constipation    Upper Endoscopy: Possible mild candidal esophagitis.  Small distal esophageal ulcer. Small antral umbilicated lesion. Nodular gastric mucosa, Small umbilicated antral nodule ? pancreatic rest. No clear etiology for dysphagia/weight loss.  Biospies/brushing taken.    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      Rec  Miralax BID  MBS rec Dys 3 /nectar thickened diet.   No GI contraindication to discharge.  Call my office in 1 week for pathology results  [phone]
86 yo M w/ reported dysphagia, weight loss, lower abd pain and constipation  At this time, patient appears comfortable and does not have any abdominal pain. NO BM in 2 days  Recs:  Soft diet as tolerated  Miralax 17g BID  Protonix 40mg PO daily  NPO @ [facility] for EGD/Flex sig. Enema in AM  This was discussed with patient and his daughter.   [phone]
86 yo M w/ reported dysphagia, weight loss, lower abd pain and constipation    Upper Endoscopy: Possible mild candidal esophagitis.  Small distal esophageal ulcer. Small antral umbilicated lesion. Nodular gastric mucosa, Small umbilicated antral nodule ? pancreatic rest. No clear etiology for dysphagia/weight loss.  Biospies/brushing taken.    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      Rec  High fiber diet.  Miralax BID  MBS rec Dys 3 /nectar thickened diet.   No GI contraindication to discharge.  Call my office in 1 week for pathology results  [phone]
86 yo m with h/o HTN, HLD, DM2, pancreatitis, basal/squamous cell carcinoma s/p Mohs surg, depression, anxiety presented from assisted living facility c/o abd pain with proctitis
86 yo m with h/o HTN, HLD, DM2, pancreatitis, basal/squamous cell carcinoma s/p Mohs surg, depression, anxiety presented from assisted living facility c/o abd pain with proctitis
88 yo M w/ reported dysphagia, weight loss, lower abd pain and constipation    Upper Endoscopy: Possible mild candidal esophagitis.  Small distal esophageal ulcer. Small antral umbilicated lesion. Nodular gastric mucosa, Small umbilicated antral nodule ? pancreatic rest. No clear etiology for dysphagia/weight loss.  Biospies/brushing taken.    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      Rec  High fiber diet.  Miralax BID  MBS rec Dys 3 /nectar thickened diet.   No GI contraindication to discharge.  Call my office in 1 week for pathology results [phone]
88 yo m with h/o HTN, HLD, DM2, pancreatitis, basal/squamous cell carcinoma s/p Mohs surg, depression, anxiety presented from assisted living facility c/o abd pain with proctitis

## 2019-10-21 NOTE — PROGRESS NOTE ADULT - PROBLEM SELECTOR PLAN 1
CT with evidence of thickened rectum  Suspect related to constipation  appreciate GI recs sp egd and flex sig mild candidal esophagitis, small esophageal ulcer, 2 large rectal ulcer, sterocoral colitis  Cont with bowel regimen with miralax bid  high fiber diet

## 2019-10-21 NOTE — PROGRESS NOTE ADULT - SUBJECTIVE AND OBJECTIVE BOX
See procedure note for full detail.    Upper Endoscopy: Possible mild candidal esophagitis.  Small distal esophageal ulcer. Small antral umbilicated lesion. Nodular gastric mucosa, Small umbilicated antral nodule ? pancreatic rest. No clear etiology for dysphagia/weight loss.  Biospies/brushing taken.    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      Rec  Resume high fiber diet.  Miralax BID  IF persistent dysphagia, esophagram is reasonable to evaluate for motility issues.  No GI contraindication to discharge.
Tolerating applesauce.  No current complaints.      PAST MEDICAL & SURGICAL HISTORY:  Squamous cell carcinoma  Basal cell carcinoma of skin  Pancreatitis: years ago  Diabetes mellitus: type 2  Hyperlipidemia  Anxiety  Depression  H/O Moh's micrographic surgery for skin cancer: 18 right cheek  Renal cyst surgery in   Inguinal hernia s/p repair (L)      MEDICATIONS  (STANDING):  aspirin enteric coated 81 milliGRAM(s) Oral daily  clonazePAM  Tablet 0.5 milliGRAM(s) Oral at bedtime  dextrose 5%. 1000 milliLiter(s) (50 mL/Hr) IV Continuous <Continuous>  dextrose 50% Injectable 12.5 Gram(s) IV Push once  dextrose 50% Injectable 25 Gram(s) IV Push once  dextrose 50% Injectable 25 Gram(s) IV Push once  docusate sodium 100 milliGRAM(s) Oral three times a day  escitalopram 10 milliGRAM(s) Oral daily  heparin  Injectable 5000 Unit(s) SubCutaneous every 12 hours  insulin lispro (HumaLOG) corrective regimen sliding scale   SubCutaneous three times a day before meals  insulin lispro (HumaLOG) corrective regimen sliding scale   SubCutaneous at bedtime  mirtazapine 15 milliGRAM(s) Oral at bedtime  multivitamin 1 Tablet(s) Oral daily  pantoprazole    Tablet 40 milliGRAM(s) Oral before breakfast  senna 2 Tablet(s) Oral at bedtime  sodium chloride 0.9%. 1000 milliLiter(s) (60 mL/Hr) IV Continuous <Continuous>    MEDICATIONS  (PRN):  dextrose 40% Gel 15 Gram(s) Oral once PRN Blood Glucose LESS THAN 70 milliGRAM(s)/deciliter  glucagon  Injectable 1 milliGRAM(s) IntraMuscular once PRN Glucose LESS THAN 70 milligrams/deciliter  ondansetron Injectable 4 milliGRAM(s) IV Push every 6 hours PRN Nausea      Allergies    penicillin (Rash)  quinidine (Unknown)    Intolerances        Review of Systems:    General:  +wt loss, fevers, chills, night sweats,fatigue,   CV:  No pain, palpitatioins, hypo/hypertension  Resp:  No dyspnea, cough, tachypnea, wheezing  GI:  see hpi  :  No pain, bleeding, incontinence, nocturia  Muscle:  No pain, weakness  Neuro:  No weakness, tingling, memory problems  Psych:  No fatigue, insomnia, mood problems, depression  Endocrine:  No polyuria, polydypsia, cold/heat intolerance  Heme:  No petechiae, ecchymosis, easy bruisability  Skin:  No rash, tattoos, scars, edema      Vital Signs:  Vital Signs Last 24 Hrs  T(C): 36.6 (17 Oct 2019 12:13), Max: 37.1 (16 Oct 2019 20:43)  T(F): 97.8 (17 Oct 2019 12:13), Max: 98.7 (16 Oct 2019 20:43)  HR: 6 (17 Oct 2019 12:13) (6 - 79)  BP: 105/62 (17 Oct 2019 12:13) (105/62 - 140/70)  BP(mean): --  RR: 18 (17 Oct 2019 12:13) (17 - 18)  SpO2: 98% (17 Oct 2019 12:13) (95% - 99%)  Daily     Daily Weight in k.8 (17 Oct 2019 05:05)        PHYSICAL EXAM:    Constitutional: NAD, well-developed  Respiratory: CTA   Cardiovascular: S1 and S2,  Gastrointestinal: BS+, soft, NT/ND, neg HSM,  Extremities: No peripheral edema, neg clubing, cyanosis  Vascular: 2+ peripheral pulses  Neurological: A/O x 3, no focal deficits  Psychiatric: Normal mood, normal affect  Skin: No rashes      LABS:                        10.8   7.83  )-----------( 220      ( 17 Oct 2019 06:22 )             34.7     10-17    140  |  100  |  12  ----------------------------<  106<H>  3.9   |  23  |  1.06    Ca    8.9      17 Oct 2019 06:22  Phos  2.2     10-17  Mg     1.8     10-17          Urinalysis Basic - ( 15 Oct 2019 15:55 )    Color: Yellow / Appearance: Clear / S.033 / pH: x  Gluc: x / Ketone: Moderate  / Bili: Negative / Urobili: Negative   Blood: x / Protein: 30 mg/dL / Nitrite: Negative   Leuk Esterase: Negative / RBC: 3 /hpf / WBC 2 /HPF   Sq Epi: x / Non Sq Epi: 0 /hpf / Bacteria: Negative            RADIOLOGY & ADDITIONAL TESTS:
CARDIOLOGY     PROGRESS  NOTE   ________________________________________________    CHIEF COMPLAINT:Patient is a 87y old  Male who presents with a chief complaint of abd pain, weight loss (15 Oct 2019 19:36)  doinmg better.  	  REVIEW OF SYSTEMS:  CONSTITUTIONAL: No fever, weight loss, or fatigue  EYES: No eye pain, visual disturbances, or discharge  ENT:  No difficulty hearing, tinnitus, vertigo; No sinus or throat pain  NECK: No pain or stiffness  RESPIRATORY: No cough, wheezing, chills or hemoptysis; No Shortness of Breath  CARDIOVASCULAR: No chest pain, palpitations, passing out, dizziness, or leg swelling  GASTROINTESTINAL: No abdominal or epigastric pain. No nausea, vomiting, or hematemesis; No diarrhea or constipation. No melena or hematochezia.  GENITOURINARY: No dysuria, frequency, hematuria, or incontinence  NEUROLOGICAL: No headaches, memory loss, loss of strength, numbness, or tremors  SKIN: No itching, burning, rashes, or lesions   LYMPH Nodes: No enlarged glands  ENDOCRINE: No heat or cold intolerance; No hair loss  MUSCULOSKELETAL: No joint pain or swelling; No muscle, back, or extremity pain  PSYCHIATRIC: No depression, anxiety, mood swings, or difficulty sleeping  HEME/LYMPH: No easy bruising, or bleeding gums  ALLERGY AND IMMUNOLOGIC: No hives or eczema	    [ ] All others negative	  [ ] Unable to obtain    PHYSICAL EXAM:  T(C): 36.7 (10-16-19 @ 04:34), Max: 37.3 (10-15-19 @ 14:40)  HR: 76 (10-16-19 @ 04:34) (72 - 86)  BP: 106/64 (10-16-19 @ 04:34) (95/53 - 125/60)  RR: 18 (10-16-19 @ 04:34) (16 - 23)  SpO2: 93% (10-16-19 @ 04:34) (93% - 99%)  Wt(kg): --  I&O's Summary    15 Oct 2019 07:01  -  16 Oct 2019 07:00  --------------------------------------------------------  IN: 0 mL / OUT: 350 mL / NET: -350 mL        Appearance: Normal	  HEENT:   Normal oral mucosa, PERRL, EOMI	  Lymphatic: No lymphadenopathy  Cardiovascular: Normal S1 S2, No JVD, + murmurs, No edema  Respiratory: rhonchi  Psychiatry: A & O x 3, Mood & affect appropriate  Gastrointestinal:  Soft, Non-tender, + BS	  Skin: No rashes, No ecchymoses, No cyanosis	  Neurologic: Non-focal  Extremities: Normal range of motion, No clubbing, cyanosis or edema  Vascular: Peripheral pulses palpable 2+ bilaterally    MEDICATIONS  (STANDING):  aspirin enteric coated 81 milliGRAM(s) Oral daily  clonazePAM  Tablet 0.5 milliGRAM(s) Oral at bedtime  dextrose 5%. 1000 milliLiter(s) (50 mL/Hr) IV Continuous <Continuous>  dextrose 50% Injectable 12.5 Gram(s) IV Push once  dextrose 50% Injectable 25 Gram(s) IV Push once  dextrose 50% Injectable 25 Gram(s) IV Push once  docusate sodium 100 milliGRAM(s) Oral three times a day  escitalopram 10 milliGRAM(s) Oral daily  heparin  Injectable 5000 Unit(s) SubCutaneous every 12 hours  insulin lispro (HumaLOG) corrective regimen sliding scale   SubCutaneous three times a day before meals  insulin lispro (HumaLOG) corrective regimen sliding scale   SubCutaneous at bedtime  mirtazapine 15 milliGRAM(s) Oral at bedtime  multivitamin 1 Tablet(s) Oral daily  pantoprazole    Tablet 40 milliGRAM(s) Oral before breakfast  senna 2 Tablet(s) Oral at bedtime  sodium chloride 0.9%. 1000 milliLiter(s) (60 mL/Hr) IV Continuous <Continuous>      TELEMETRY: 	    ECG:  	  RADIOLOGY:  OTHER: 	  	  LABS:	 	    CARDIAC MARKERS:  CARDIAC MARKERS ( 15 Oct 2019 20:47 )  x     / x     / 121 U/L / x     / 4.4 ng/mL                                10.0   8.29  )-----------( 200      ( 16 Oct 2019 06:09 )             31.6     10-16    140  |  103  |  14  ----------------------------<  99  3.8   |  27  |  1.25    Ca    8.6      16 Oct 2019 06:09    TPro  7.2  /  Alb  3.5  /  TBili  0.5  /  DBili  x   /  AST  15  /  ALT  11  /  AlkPhos  71  10-15    proBNP:   Lipid Profile:   HgA1c:   TSH:         Assessment and plan  ---------------------------  cardiac enzymes cpk/mb negative  tele pause of 2.5 sec asymptomatic ,otherwise hr 60 s  discussed with daughter and son worried about weight loss and sore throat  ivf for hydration  nutrition consult  GI eval ? EGD  observe on tele till tonight for 24 hours'  dvt prophylaxis
CARDIOLOGY     PROGRESS  NOTE   ________________________________________________    CHIEF COMPLAINT:Patient is a 87y old  Male who presents with a chief complaint of abd pain, weight loss (16 Oct 2019 17:30)  doing better, increase appetite.  	  REVIEW OF SYSTEMS:  CONSTITUTIONAL: No fever, weight loss, or fatigue  EYES: No eye pain, visual disturbances, or discharge  ENT:  No difficulty hearing, tinnitus, vertigo; No sinus or throat pain  NECK: No pain or stiffness  RESPIRATORY: No cough, wheezing, chills or hemoptysis; No Shortness of Breath  CARDIOVASCULAR: No chest pain, palpitations, passing out, dizziness, or leg swelling  GASTROINTESTINAL: No abdominal or epigastric pain. No nausea, vomiting, or hematemesis; No diarrhea or constipation. No melena or hematochezia.  GENITOURINARY: No dysuria, frequency, hematuria, or incontinence  NEUROLOGICAL: No headaches, memory loss, loss of strength, numbness, or tremors  SKIN: No itching, burning, rashes, or lesions   LYMPH Nodes: No enlarged glands  ENDOCRINE: No heat or cold intolerance; No hair loss  MUSCULOSKELETAL: No joint pain or swelling; No muscle, back, or extremity pain  PSYCHIATRIC: No depression, anxiety, mood swings, or difficulty sleeping  HEME/LYMPH: No easy bruising, or bleeding gums  ALLERGY AND IMMUNOLOGIC: No hives or eczema	    [ ] All others negative	  [ ] Unable to obtain    PHYSICAL EXAM:  T(C): 36.3 (10-17-19 @ 00:50), Max: 37.1 (10-16-19 @ 20:43)  HR: 63 (10-17-19 @ 00:50) (63 - 79)  BP: 117/67 (10-17-19 @ 00:50) (105/66 - 118/67)  RR: 18 (10-17-19 @ 00:50) (18 - 18)  SpO2: 95% (10-17-19 @ 00:50) (93% - 99%)  Wt(kg): --  I&O's Summary    15 Oct 2019 07:01  -  16 Oct 2019 07:00  --------------------------------------------------------  IN: 0 mL / OUT: 350 mL / NET: -350 mL    16 Oct 2019 07:01  -  17 Oct 2019 04:20  --------------------------------------------------------  IN: 840 mL / OUT: 200 mL / NET: 640 mL        Appearance: Normal	  HEENT:   Normal oral mucosa, PERRL, EOMI	  Lymphatic: No lymphadenopathy  Cardiovascular: Normal S1 S2, No JVD, + murmurs, No edema  Respiratory: Lungs clear to auscultation	  Psychiatry: A & O x 3, Mood & affect appropriate  Gastrointestinal:  Soft, Non-tender, + BS	  Skin: No rashes, No ecchymoses, No cyanosis	  Neurologic: Non-focal  Extremities: Normal range of motion, No clubbing, cyanosis or edema  Vascular: Peripheral pulses palpable 2+ bilaterally    MEDICATIONS  (STANDING):  aspirin enteric coated 81 milliGRAM(s) Oral daily  clonazePAM  Tablet 0.5 milliGRAM(s) Oral at bedtime  dextrose 5%. 1000 milliLiter(s) (50 mL/Hr) IV Continuous <Continuous>  dextrose 50% Injectable 12.5 Gram(s) IV Push once  dextrose 50% Injectable 25 Gram(s) IV Push once  dextrose 50% Injectable 25 Gram(s) IV Push once  docusate sodium 100 milliGRAM(s) Oral three times a day  escitalopram 10 milliGRAM(s) Oral daily  heparin  Injectable 5000 Unit(s) SubCutaneous every 12 hours  insulin lispro (HumaLOG) corrective regimen sliding scale   SubCutaneous three times a day before meals  insulin lispro (HumaLOG) corrective regimen sliding scale   SubCutaneous at bedtime  mirtazapine 15 milliGRAM(s) Oral at bedtime  multivitamin 1 Tablet(s) Oral daily  pantoprazole    Tablet 40 milliGRAM(s) Oral before breakfast  polyethylene glycol 3350 17 Gram(s) Oral two times a day  senna 2 Tablet(s) Oral at bedtime  sodium chloride 0.9%. 1000 milliLiter(s) (60 mL/Hr) IV Continuous <Continuous>      TELEMETRY: 	    ECG:  	  RADIOLOGY:  OTHER: 	  	  LABS:	 	    CARDIAC MARKERS:  CARDIAC MARKERS ( 15 Oct 2019 20:47 )  x     / x     / 121 U/L / x     / 4.4 ng/mL                                10.0   8.29  )-----------( 200      ( 16 Oct 2019 06:09 )             31.6     10-16    140  |  103  |  14  ----------------------------<  99  3.8   |  27  |  1.25    Ca    8.6      16 Oct 2019 06:09    TPro  7.2  /  Alb  3.5  /  TBili  0.5  /  DBili  x   /  AST  15  /  ALT  11  /  AlkPhos  71  10-15    proBNP:   Lipid Profile:   HgA1c: Hemoglobin A1C, Whole Blood: 5.2 % (10-16 @ 08:13)    TSH: Thyroid Stimulating Hormone, Serum: 1.81 uIU/mL (10-16 @ 10:05)  Thyroid Stimulating Hormone, Serum: 1.81 uIU/mL (10-16 @ 09:48)    < from: TTE with Doppler (w/Cont) (09.15.12 @ 12:39) >  1. Mitral annular calcification, otherwise normal mitral  valve. Mild-moderate mitral regurgitation.  2. Endocardium not well visualized; grossly normal left  ventricular systolic function.  Endocardial visualization  enhanced with intravenus injection of echo contrast  ([employer]).  3. The right ventricleis not well visualized; grossly  normal right ventricular systolic function.    < end of copied text >    Troponin T, High Sensitivity (10.15.19 @ 20:47)    Troponin T, High Sensitivity Result: 64: Rapid upward or downward changes in high-sensitivity troponin levels  suggest acute myocardial injury. Renal impairment may cause sustained  troponin elevations.  Normal: <6 - 14 ng/L  Indeterminate: 15-51 ng/L  Elevated: > 51 ng/L  See http://labs/test/[email or web address] on the [facility] intranet for more  information ng/L        Assessment and plan  ---------------------------  doing better appetite is improving  tele noted no arrythmia no pauses.  may dc tele  awaiting Gi work up  pt with bifascicular block, with no hx of syncope  dvt prophylaxis  awaiting gi work up  pt is clear for EGD  cardiac wise if needed, monitoring during and post procedure
Occasional sensation of oropharyngeal dysphagia.  S/S MBS with nectar thick, Dys 3 diet.  stercoral ulcers.      PAST MEDICAL & SURGICAL HISTORY:  Squamous cell carcinoma  Basal cell carcinoma of skin  Pancreatitis: years ago  Diabetes mellitus: type 2  Hyperlipidemia  Anxiety  Depression  H/O Moh's micrographic surgery for skin cancer: 4/30/18 right cheek  Renal cyst surgery in 2008  Inguinal hernia s/p repair (L)      MEDICATIONS  (STANDING):  aspirin enteric coated 81 milliGRAM(s) Oral daily  clonazePAM  Tablet 0.5 milliGRAM(s) Oral at bedtime  dextrose 5%. 1000 milliLiter(s) (50 mL/Hr) IV Continuous <Continuous>  dextrose 50% Injectable 12.5 Gram(s) IV Push once  dextrose 50% Injectable 25 Gram(s) IV Push once  dextrose 50% Injectable 25 Gram(s) IV Push once  docusate sodium 100 milliGRAM(s) Oral three times a day  escitalopram 10 milliGRAM(s) Oral daily  heparin  Injectable 5000 Unit(s) SubCutaneous every 12 hours  insulin lispro (HumaLOG) corrective regimen sliding scale   SubCutaneous three times a day before meals  insulin lispro (HumaLOG) corrective regimen sliding scale   SubCutaneous at bedtime  mirtazapine 15 milliGRAM(s) Oral at bedtime  multivitamin 1 Tablet(s) Oral daily  pantoprazole    Tablet 40 milliGRAM(s) Oral before breakfast  senna 2 Tablet(s) Oral at bedtime  sodium chloride 0.9%. 1000 milliLiter(s) (60 mL/Hr) IV Continuous <Continuous>    MEDICATIONS  (PRN):  dextrose 40% Gel 15 Gram(s) Oral once PRN Blood Glucose LESS THAN 70 milliGRAM(s)/deciliter  glucagon  Injectable 1 milliGRAM(s) IntraMuscular once PRN Glucose LESS THAN 70 milligrams/deciliter  ondansetron Injectable 4 milliGRAM(s) IV Push every 6 hours PRN Nausea      Allergies    penicillin (Rash)  quinidine (Unknown)    Intolerances        Review of Systems:    General:  +wt loss, fevers, chills, night sweats,fatigue,   CV:  No pain, palpitatioins, hypo/hypertension  Resp:  No dyspnea, cough, tachypnea, wheezing  GI:  see hpi  :  No pain, bleeding, incontinence, nocturia  Muscle:  No pain, weakness  Neuro:  No weakness, tingling, memory problems  Psych:  No fatigue, insomnia, mood problems, depression  Endocrine:  No polyuria, polydypsia, cold/heat intolerance  Heme:  No petechiae, ecchymosis, easy bruisability  Skin:  No rash, tattoos, scars, edema      Vital Signs Last 24 Hrs  T(C): 36.7 (20 Oct 2019 04:22), Max: 36.8 (19 Oct 2019 20:00)  T(F): 98 (20 Oct 2019 04:22), Max: 98.2 (19 Oct 2019 20:00)  HR: 83 (20 Oct 2019 04:22) (81 - 83)  BP: 154/73 (20 Oct 2019 04:22) (110/61 - 154/73)  BP(mean): --  RR: 18 (20 Oct 2019 04:22) (18 - 18)  SpO2: 96% (20 Oct 2019 04:22) (96% - 98%)        PHYSICAL EXAM:    Constitutional: NAD, well-developed  Respiratory: CTA   Cardiovascular: S1 and S2,  Gastrointestinal: BS+, soft, NT/ND, neg HSM,  Extremities: No peripheral edema, neg clubing, cyanosis  Vascular: 2+ peripheral pulses  Neurological: A/O x 3, no focal deficits  Psychiatric: Normal mood, normal affect  Skin: No rashes    LABS:                        10.3   9.44  )-----------( 208      ( 20 Oct 2019 05:20 )             32.9       RADIOLOGY & ADDITIONAL TESTS:
Occasional sensation of oropharyngeal dysphagia.  S/S MBS with nectar thick, Dys 3 diet.  stercoral ulcers.      PAST MEDICAL & SURGICAL HISTORY:  Squamous cell carcinoma  Basal cell carcinoma of skin  Pancreatitis: years ago  Diabetes mellitus: type 2  Hyperlipidemia  Anxiety  Depression  H/O Moh's micrographic surgery for skin cancer: 4/30/18 right cheek  Renal cyst surgery in 2008  Inguinal hernia s/p repair (L)      MEDICATIONS  (STANDING):  aspirin enteric coated 81 milliGRAM(s) Oral daily  clonazePAM  Tablet 0.5 milliGRAM(s) Oral at bedtime  dextrose 5%. 1000 milliLiter(s) (50 mL/Hr) IV Continuous <Continuous>  dextrose 50% Injectable 12.5 Gram(s) IV Push once  dextrose 50% Injectable 25 Gram(s) IV Push once  dextrose 50% Injectable 25 Gram(s) IV Push once  docusate sodium 100 milliGRAM(s) Oral three times a day  escitalopram 10 milliGRAM(s) Oral daily  heparin  Injectable 5000 Unit(s) SubCutaneous every 12 hours  insulin lispro (HumaLOG) corrective regimen sliding scale   SubCutaneous three times a day before meals  insulin lispro (HumaLOG) corrective regimen sliding scale   SubCutaneous at bedtime  mirtazapine 15 milliGRAM(s) Oral at bedtime  multivitamin 1 Tablet(s) Oral daily  pantoprazole    Tablet 40 milliGRAM(s) Oral before breakfast  senna 2 Tablet(s) Oral at bedtime  sodium chloride 0.9%. 1000 milliLiter(s) (60 mL/Hr) IV Continuous <Continuous>    MEDICATIONS  (PRN):  dextrose 40% Gel 15 Gram(s) Oral once PRN Blood Glucose LESS THAN 70 milliGRAM(s)/deciliter  glucagon  Injectable 1 milliGRAM(s) IntraMuscular once PRN Glucose LESS THAN 70 milligrams/deciliter  ondansetron Injectable 4 milliGRAM(s) IV Push every 6 hours PRN Nausea      Allergies    penicillin (Rash)  quinidine (Unknown)    Intolerances        Review of Systems:    General:  +wt loss, fevers, chills, night sweats,fatigue,   CV:  No pain, palpitatioins, hypo/hypertension  Resp:  No dyspnea, cough, tachypnea, wheezing  GI:  see hpi  :  No pain, bleeding, incontinence, nocturia  Muscle:  No pain, weakness  Neuro:  No weakness, tingling, memory problems  Psych:  No fatigue, insomnia, mood problems, depression  Endocrine:  No polyuria, polydypsia, cold/heat intolerance  Heme:  No petechiae, ecchymosis, easy bruisability  Skin:  No rash, tattoos, scars, edema      Vital Signs Last 24 Hrs  T(C): 37.2 (19 Oct 2019 04:02), Max: 37.2 (18 Oct 2019 12:02)  T(F): 99 (19 Oct 2019 04:02), Max: 99 (19 Oct 2019 04:02)  HR: 85 (19 Oct 2019 04:02) (69 - 85)  BP: 108/64 (19 Oct 2019 04:02) (108/64 - 123/71)  BP(mean): --  RR: 18 (19 Oct 2019 04:02) (18 - 18)  SpO2: 94% (19 Oct 2019 04:02) (94% - 97%)        PHYSICAL EXAM:    Constitutional: NAD, well-developed  Respiratory: CTA   Cardiovascular: S1 and S2,  Gastrointestinal: BS+, soft, NT/ND, neg HSM,  Extremities: No peripheral edema, neg clubing, cyanosis  Vascular: 2+ peripheral pulses  Neurological: A/O x 3, no focal deficits  Psychiatric: Normal mood, normal affect  Skin: No rashes      LABS:                        10.7   12.24 )-----------( 212      ( 19 Oct 2019 06:19 )             34.3     10-18    139  |  100  |  12  ----------------------------<  121<H>  3.9   |  25  |  1.19    Ca    8.7      18 Oct 2019 07:01  Phos  2.2     10-17  Mg     1.8     10-17                          RADIOLOGY & ADDITIONAL TESTS:
Patient is a 87y old  Male who presents with a chief complaint of abd pain, weight loss (16 Oct 2019 08:27)      INTERVAL HPI/OVERNIGHT EVENTS: noted, feels well  denies abd pain, n/v      Vital Signs Last 24 Hrs  T(C): 36.9 (16 Oct 2019 12:15), Max: 37.3 (15 Oct 2019 14:40)  T(F): 98.5 (16 Oct 2019 12:15), Max: 99.1 (15 Oct 2019 14:40)  HR: 66 (16 Oct 2019 12:15) (66 - 83)  BP: 105/66 (16 Oct 2019 12:15) (95/53 - 125/60)  BP(mean): --  RR: 18 (16 Oct 2019 12:15) (17 - 23)  SpO2: 97% (16 Oct 2019 12:15) (93% - 99%)    aspirin enteric coated 81 milliGRAM(s) Oral daily  clonazePAM  Tablet 0.5 milliGRAM(s) Oral at bedtime  dextrose 40% Gel 15 Gram(s) Oral once PRN  dextrose 5%. 1000 milliLiter(s) IV Continuous <Continuous>  dextrose 50% Injectable 12.5 Gram(s) IV Push once  dextrose 50% Injectable 25 Gram(s) IV Push once  dextrose 50% Injectable 25 Gram(s) IV Push once  docusate sodium 100 milliGRAM(s) Oral three times a day  escitalopram 10 milliGRAM(s) Oral daily  glucagon  Injectable 1 milliGRAM(s) IntraMuscular once PRN  heparin  Injectable 5000 Unit(s) SubCutaneous every 12 hours  insulin lispro (HumaLOG) corrective regimen sliding scale   SubCutaneous three times a day before meals  insulin lispro (HumaLOG) corrective regimen sliding scale   SubCutaneous at bedtime  mirtazapine 15 milliGRAM(s) Oral at bedtime  multivitamin 1 Tablet(s) Oral daily  ondansetron Injectable 4 milliGRAM(s) IV Push every 6 hours PRN  pantoprazole    Tablet 40 milliGRAM(s) Oral before breakfast  senna 2 Tablet(s) Oral at bedtime  sodium chloride 0.9%. 1000 milliLiter(s) IV Continuous <Continuous>      PHYSICAL EXAM:  GENERAL: NAD,   EYES: conjunctiva and sclera clear  ENMT: Moist mucous membranes  NECK: Supple, No JVD, Normal thyroid  NERVOUS SYSTEM:  Alert & Oriented X,   CHEST/LUNG: Clear to auscultation bilaterally; No rales, rhonchi, wheezing, or rubs  HEART: Regular rate and rhythm; No murmurs, rubs, or gallops  ABDOMEN: Soft, Nontender, Nondistended; Bowel sounds present  EXTREMITIES:  2+ Peripheral Pulses, No clubbing, cyanosis, or edema  LYMPH: No lymphadenopathy noted  SKIN: No rashes or lesions    Consultant(s) Notes Reviewed:  [x ] YES  [ ] NO  Care Discussed with Consultants/Other Providers [ x] YES  [ ] NO    LABS:                        10.0   8.29  )-----------( 200      ( 16 Oct 2019 06:09 )             31.6     10-16    140  |  103  |  14  ----------------------------<  99  3.8   |  27  |  1.25    Ca    8.6      16 Oct 2019 06:09    TPro  7.2  /  Alb  3.5  /  TBili  0.5  /  DBili  x   /  AST  15  /  ALT  11  /  AlkPhos  71  10-15      Urinalysis Basic - ( 15 Oct 2019 15:55 )    Color: Yellow / Appearance: Clear / S.033 / pH: x  Gluc: x / Ketone: Moderate  / Bili: Negative / Urobili: Negative   Blood: x / Protein: 30 mg/dL / Nitrite: Negative   Leuk Esterase: Negative / RBC: 3 /hpf / WBC 2 /HPF   Sq Epi: x / Non Sq Epi: 0 /hpf / Bacteria: Negative      CAPILLARY BLOOD GLUCOSE      POCT Blood Glucose.: 92 mg/dL (16 Oct 2019 12:48)  POCT Blood Glucose.: 92 mg/dL (16 Oct 2019 08:45)  POCT Blood Glucose.: 110 mg/dL (15 Oct 2019 22:08)        Urinalysis Basic - ( 15 Oct 2019 15:55 )    Color: Yellow / Appearance: Clear / S.033 / pH: x  Gluc: x / Ketone: Moderate  / Bili: Negative / Urobili: Negative   Blood: x / Protein: 30 mg/dL / Nitrite: Negative   Leuk Esterase: Negative / RBC: 3 /hpf / WBC 2 /HPF   Sq Epi: x / Non Sq Epi: 0 /hpf / Bacteria: Negative          RADIOLOGY & ADDITIONAL TESTS:    Imaging Personally Reviewed:  [x ] YES  [ ] NO
Patient is a 87y old  Male who presents with a chief complaint of abd pain, weight loss (17 Oct 2019 04:19)      SUBJECTIVE / OVERNIGHT EVENTS:    Patient seen and examined. pt states no bm since hospitalization. denies abd pain, n/v. pt tolerating PO. per dtr, states patient has lost 30lb. decreased appetite. pt denies dysphagia.       Vital Signs Last 24 Hrs  T(C): 36.6 (17 Oct 2019 12:13), Max: 37.1 (16 Oct 2019 20:43)  T(F): 97.8 (17 Oct 2019 12:13), Max: 98.7 (16 Oct 2019 20:43)  HR: 6 (17 Oct 2019 12:13) (6 - 79)  BP: 105/62 (17 Oct 2019 12:13) (105/62 - 140/70)  BP(mean): --  RR: 18 (17 Oct 2019 12:13) (17 - 18)  SpO2: 98% (17 Oct 2019 12:13) (95% - 99%)  I&O's Summary    16 Oct 2019 07:01  -  17 Oct 2019 07:00  --------------------------------------------------------  IN: 840 mL / OUT: 650 mL / NET: 190 mL    17 Oct 2019 07:01  -  17 Oct 2019 13:27  --------------------------------------------------------  IN: 0 mL / OUT: 225 mL / NET: -225 mL        PE:  GENERAL: NAD, AAOx1  HEAD:  Atraumatic, Normocephalic  EYES: conjunctiva and sclera clear  NECK: Supple, No JVD  CHEST/LUNG: CTABL, No wheeze  HEART: Regular rate and rhythm; no murmur  ABDOMEN: Soft, Nontender, Nondistended; Bowel sounds present  EXTREMITIES:  2+ Peripheral Pulses, No clubbing, cyanosis, or edema  SKIN: No rashes or lesions  NEURO: No focal deficits    LABS:                        10.8   7.83  )-----------( 220      ( 17 Oct 2019 06:22 )             34.7     10-17    140  |  100  |  12  ----------------------------<  106<H>  3.9   |  23  |  1.06    Ca    8.9      17 Oct 2019 06:22  Phos  2.2     10-17  Mg     1.8     1017        CAPILLARY BLOOD GLUCOSE      POCT Blood Glucose.: 158 mg/dL (17 Oct 2019 12:55)  POCT Blood Glucose.: 109 mg/dL (17 Oct 2019 08:46)  POCT Blood Glucose.: 161 mg/dL (16 Oct 2019 21:36)  POCT Blood Glucose.: 132 mg/dL (16 Oct 2019 17:31)    CARDIAC MARKERS ( 15 Oct 2019 20:47 )  x     / x     / 121 U/L / x     / 4.4 ng/mL      Urinalysis Basic - ( 15 Oct 2019 15:55 )    Color: Yellow / Appearance: Clear / S.033 / pH: x  Gluc: x / Ketone: Moderate  / Bili: Negative / Urobili: Negative   Blood: x / Protein: 30 mg/dL / Nitrite: Negative   Leuk Esterase: Negative / RBC: 3 /hpf / WBC 2 /HPF   Sq Epi: x / Non Sq Epi: 0 /hpf / Bacteria: Negative        RADIOLOGY & ADDITIONAL TESTS:    Imaging Personally Reviewed:  [x] YES  [ ] NO    Consultant(s) Notes Reviewed:  [x] YES  [ ] NO    MEDICATIONS  (STANDING):  aspirin enteric coated 81 milliGRAM(s) Oral daily  clonazePAM  Tablet 0.5 milliGRAM(s) Oral at bedtime  dextrose 5%. 1000 milliLiter(s) (50 mL/Hr) IV Continuous <Continuous>  dextrose 50% Injectable 12.5 Gram(s) IV Push once  dextrose 50% Injectable 25 Gram(s) IV Push once  dextrose 50% Injectable 25 Gram(s) IV Push once  docusate sodium 100 milliGRAM(s) Oral three times a day  escitalopram 10 milliGRAM(s) Oral daily  heparin  Injectable 5000 Unit(s) SubCutaneous every 12 hours  insulin lispro (HumaLOG) corrective regimen sliding scale   SubCutaneous three times a day before meals  insulin lispro (HumaLOG) corrective regimen sliding scale   SubCutaneous at bedtime  mirtazapine 15 milliGRAM(s) Oral at bedtime  multivitamin 1 Tablet(s) Oral daily  pantoprazole    Tablet 40 milliGRAM(s) Oral before breakfast  polyethylene glycol 3350 17 Gram(s) Oral two times a day  senna 2 Tablet(s) Oral at bedtime  sodium chloride 0.9%. 1000 milliLiter(s) (60 mL/Hr) IV Continuous <Continuous>    MEDICATIONS  (PRN):  dextrose 40% Gel 15 Gram(s) Oral once PRN Blood Glucose LESS THAN 70 milliGRAM(s)/deciliter  glucagon  Injectable 1 milliGRAM(s) IntraMuscular once PRN Glucose LESS THAN 70 milligrams/deciliter  ondansetron Injectable 4 milliGRAM(s) IV Push every 6 hours PRN Nausea      Care Discussed with Consultants/Other Providers [x] YES  [ ] NO    HEALTH ISSUES - PROBLEM Dx:  Depression, unspecified depression type: Depression, unspecified depression type  Type 2 diabetes mellitus without complication, without long-term current use of insulin: Type 2 diabetes mellitus without complication, without long-term current use of insulin  Anemia, unspecified type: Anemia, unspecified type  CKD (chronic kidney disease) stage 3, GFR 30-59 ml/min: CKD (chronic kidney disease) stage 3, GFR 30-59 ml/min  Troponin level elevated: Troponin level elevated  Dysphagia, unspecified type: Dysphagia, unspecified type  Proctitis: Proctitis
Patient is a 87y old  Male who presents with a chief complaint of abd pain, weight loss (18 Oct 2019 09:09)      SUBJECTIVE / OVERNIGHT EVENTS:    Patient seen and examined. sp endoscopy and flex sig. pending MBS. denies cp sob.      Vital Signs Last 24 Hrs  T(C): 37.2 (18 Oct 2019 12:02), Max: 37.2 (18 Oct 2019 12:02)  T(F): 98.9 (18 Oct 2019 12:02), Max: 98.9 (18 Oct 2019 12:02)  HR: 84 (18 Oct 2019 04:41) (74 - 84)  BP: 129/71 (18 Oct 2019 04:41) (115/68 - 129/71)  BP(mean): --  RR: 18 (18 Oct 2019 12:02) (18 - 18)  SpO2: 95% (18 Oct 2019 12:02) (95% - 96%)  I&O's Summary    17 Oct 2019 07:01  -  18 Oct 2019 07:00  --------------------------------------------------------  IN: 420 mL / OUT: 450 mL / NET: -30 mL        PE:  GENERAL: NAD, AAOx2  HEAD:  Atraumatic, Normocephalic  EYES: EOMI, PERRLA, conjunctiva and sclera clear  NECK: Supple, No JVD  CHEST/LUNG: CTABL, No wheeze  HEART: Regular rate and rhythm; no murmur  ABDOMEN: Soft, Nontender, Nondistended; Bowel sounds present  EXTREMITIES:  2+ Peripheral Pulses, No clubbing, cyanosis, or edema  SKIN: No rashes or lesions  NEURO: No focal deficits    LABS:                        10.8   9.84  )-----------( 218      ( 18 Oct 2019 07:02 )             34.6     10-18    139  |  100  |  12  ----------------------------<  121<H>  3.9   |  25  |  1.19    Ca    8.7      18 Oct 2019 07:01  Phos  2.2     10-17  Mg     1.8     10-17        CAPILLARY BLOOD GLUCOSE      POCT Blood Glucose.: 125 mg/dL (18 Oct 2019 12:55)  POCT Blood Glucose.: 113 mg/dL (18 Oct 2019 10:20)  POCT Blood Glucose.: 115 mg/dL (18 Oct 2019 06:03)  POCT Blood Glucose.: 142 mg/dL (17 Oct 2019 21:30)  POCT Blood Glucose.: 139 mg/dL (17 Oct 2019 17:27)            RADIOLOGY & ADDITIONAL TESTS:    Imaging Personally Reviewed:  [x] YES  [ ] NO    Consultant(s) Notes Reviewed:  [x] YES  [ ] NO    MEDICATIONS  (STANDING):  aspirin enteric coated 81 milliGRAM(s) Oral daily  clonazePAM  Tablet 0.5 milliGRAM(s) Oral at bedtime  dextrose 5%. 1000 milliLiter(s) (50 mL/Hr) IV Continuous <Continuous>  dextrose 50% Injectable 12.5 Gram(s) IV Push once  dextrose 50% Injectable 25 Gram(s) IV Push once  dextrose 50% Injectable 25 Gram(s) IV Push once  docusate sodium 100 milliGRAM(s) Oral three times a day  escitalopram 10 milliGRAM(s) Oral daily  heparin  Injectable 5000 Unit(s) SubCutaneous every 12 hours  insulin lispro (HumaLOG) corrective regimen sliding scale   SubCutaneous three times a day before meals  insulin lispro (HumaLOG) corrective regimen sliding scale   SubCutaneous at bedtime  mirtazapine 15 milliGRAM(s) Oral at bedtime  multivitamin 1 Tablet(s) Oral daily  pantoprazole    Tablet 40 milliGRAM(s) Oral before breakfast  polyethylene glycol 3350 17 Gram(s) Oral two times a day  senna 2 Tablet(s) Oral at bedtime    MEDICATIONS  (PRN):  dextrose 40% Gel 15 Gram(s) Oral once PRN Blood Glucose LESS THAN 70 milliGRAM(s)/deciliter  glucagon  Injectable 1 milliGRAM(s) IntraMuscular once PRN Glucose LESS THAN 70 milligrams/deciliter  ondansetron Injectable 4 milliGRAM(s) IV Push every 6 hours PRN Nausea      Care Discussed with Consultants/Other Providers [x] YES  [ ] NO    HEALTH ISSUES - PROBLEM Dx:  Depression, unspecified depression type: Depression, unspecified depression type  Type 2 diabetes mellitus without complication, without long-term current use of insulin: Type 2 diabetes mellitus without complication, without long-term current use of insulin  Anemia, unspecified type: Anemia, unspecified type  CKD (chronic kidney disease) stage 3, GFR 30-59 ml/min: CKD (chronic kidney disease) stage 3, GFR 30-59 ml/min  Troponin level elevated: Troponin level elevated  Dysphagia, unspecified type: Dysphagia, unspecified type  Proctitis: Proctitis
Patient is a 87y old  Male who presents with a chief complaint of abd pain, weight loss (19 Oct 2019 10:07)      SUBJECTIVE / OVERNIGHT EVENTS:    Patient seen and examined. denies cp sob. some pain when swallowing.      Vital Signs Last 24 Hrs  T(C): 36.7 (19 Oct 2019 12:00), Max: 37.2 (19 Oct 2019 04:02)  T(F): 98 (19 Oct 2019 12:00), Max: 99 (19 Oct 2019 04:02)  HR: 81 (19 Oct 2019 12:00) (69 - 85)  BP: 121/80 (19 Oct 2019 12:00) (108/64 - 123/71)  BP(mean): --  RR: 18 (19 Oct 2019 12:00) (18 - 18)  SpO2: 98% (19 Oct 2019 12:00) (94% - 98%)  I&O's Summary    18 Oct 2019 07:01  -  19 Oct 2019 07:00  --------------------------------------------------------  IN: 540 mL / OUT: 350 mL / NET: 190 mL        PE:  GENERAL: NAD, AAOx1, frail  HEAD:  Atraumatic, Normocephalic  EYES: conjunctiva and sclera clear  CHEST/LUNG: CTABL, No wheeze  HEART: Regular rate and rhythm; no murmur  ABDOMEN: Soft, Nontender, Nondistended; Bowel sounds present  EXTREMITIES:  2+ Peripheral Pulses, No clubbing, cyanosis, or edema  SKIN: No rashes or lesions  NEURO: No focal deficits    LABS:                        10.7   12.24 )-----------( 212      ( 19 Oct 2019 06:19 )             34.3     10-18    139  |  100  |  12  ----------------------------<  121<H>  3.9   |  25  |  1.19    Ca    8.7      18 Oct 2019 07:01        CAPILLARY BLOOD GLUCOSE      POCT Blood Glucose.: 120 mg/dL (19 Oct 2019 08:39)  POCT Blood Glucose.: 159 mg/dL (18 Oct 2019 21:38)  POCT Blood Glucose.: 180 mg/dL (18 Oct 2019 17:27)  POCT Blood Glucose.: 125 mg/dL (18 Oct 2019 12:55)            RADIOLOGY & ADDITIONAL TESTS:    Imaging Personally Reviewed:  [x] YES  [ ] NO    Consultant(s) Notes Reviewed:  [x] YES  [ ] NO    MEDICATIONS  (STANDING):  aspirin enteric coated 81 milliGRAM(s) Oral daily  clonazePAM  Tablet 0.5 milliGRAM(s) Oral at bedtime  dextrose 5%. 1000 milliLiter(s) (50 mL/Hr) IV Continuous <Continuous>  dextrose 50% Injectable 12.5 Gram(s) IV Push once  dextrose 50% Injectable 25 Gram(s) IV Push once  dextrose 50% Injectable 25 Gram(s) IV Push once  docusate sodium 100 milliGRAM(s) Oral three times a day  escitalopram 10 milliGRAM(s) Oral daily  heparin  Injectable 5000 Unit(s) SubCutaneous every 12 hours  insulin lispro (HumaLOG) corrective regimen sliding scale   SubCutaneous three times a day before meals  insulin lispro (HumaLOG) corrective regimen sliding scale   SubCutaneous at bedtime  mirtazapine 15 milliGRAM(s) Oral at bedtime  multivitamin 1 Tablet(s) Oral daily  pantoprazole    Tablet 40 milliGRAM(s) Oral before breakfast  polyethylene glycol 3350 17 Gram(s) Oral two times a day  senna 2 Tablet(s) Oral at bedtime    MEDICATIONS  (PRN):  dextrose 40% Gel 15 Gram(s) Oral once PRN Blood Glucose LESS THAN 70 milliGRAM(s)/deciliter  glucagon  Injectable 1 milliGRAM(s) IntraMuscular once PRN Glucose LESS THAN 70 milligrams/deciliter  ondansetron Injectable 4 milliGRAM(s) IV Push every 6 hours PRN Nausea      Care Discussed with Consultants/Other Providers [x] YES  [ ] NO    HEALTH ISSUES - PROBLEM Dx:  Depression, unspecified depression type: Depression, unspecified depression type  Type 2 diabetes mellitus without complication, without long-term current use of insulin: Type 2 diabetes mellitus without complication, without long-term current use of insulin  Anemia, unspecified type: Anemia, unspecified type  CKD (chronic kidney disease) stage 3, GFR 30-59 ml/min: CKD (chronic kidney disease) stage 3, GFR 30-59 ml/min  Troponin level elevated: Troponin level elevated  Dysphagia, unspecified type: Dysphagia, unspecified type  Proctitis: Proctitis
Patient is a 87y old  Male who presents with a chief complaint of abd pain, weight loss (20 Oct 2019 08:48)      SUBJECTIVE / OVERNIGHT EVENTS:    Patient seen and examined. denies cp sob. some pain with swallowing. tolerating thickened diet. ambulated in the hallway with PT.      Vital Signs Last 24 Hrs  T(C): 36.7 (20 Oct 2019 04:22), Max: 36.8 (19 Oct 2019 20:00)  T(F): 98 (20 Oct 2019 04:22), Max: 98.2 (19 Oct 2019 20:00)  HR: 83 (20 Oct 2019 04:22) (81 - 83)  BP: 154/73 (20 Oct 2019 04:22) (110/61 - 154/73)  BP(mean): --  RR: 18 (20 Oct 2019 04:22) (18 - 18)  SpO2: 96% (20 Oct 2019 04:22) (96% - 98%)  I&O's Summary    19 Oct 2019 07:01  -  20 Oct 2019 07:00  --------------------------------------------------------  IN: 350 mL / OUT: 300 mL / NET: 50 mL    20 Oct 2019 07:01  -  20 Oct 2019 11:19  --------------------------------------------------------  IN: 120 mL / OUT: 0 mL / NET: 120 mL        PE:  GENERAL: NAD, AAOx1 frail  HEAD:  Atraumatic, Normocephalic  EYES: EOMI, PERRLA, conjunctiva and sclera clear  NECK: Supple, No JVD  CHEST/LUNG: CTABL, No wheeze  HEART: Regular rate and rhythm; nomurmur  ABDOMEN: Soft, Nontender, Nondistended; Bowel sounds present  EXTREMITIES:  2+ Peripheral Pulses, No clubbing, cyanosis, or edema  SKIN: No rashes or lesions  NEURO: No focal deficits    LABS:                        10.3   9.44  )-----------( 208      ( 20 Oct 2019 05:20 )             32.9             CAPILLARY BLOOD GLUCOSE      POCT Blood Glucose.: 114 mg/dL (20 Oct 2019 09:00)  POCT Blood Glucose.: 141 mg/dL (19 Oct 2019 21:31)  POCT Blood Glucose.: 109 mg/dL (19 Oct 2019 17:30)  POCT Blood Glucose.: 115 mg/dL (19 Oct 2019 12:40)            RADIOLOGY & ADDITIONAL TESTS:    Imaging Personally Reviewed:  [x] YES  [ ] NO    Consultant(s) Notes Reviewed:  [x] YES  [ ] NO    MEDICATIONS  (STANDING):  aspirin enteric coated 81 milliGRAM(s) Oral daily  clonazePAM  Tablet 0.5 milliGRAM(s) Oral at bedtime  dextrose 5%. 1000 milliLiter(s) (50 mL/Hr) IV Continuous <Continuous>  dextrose 50% Injectable 12.5 Gram(s) IV Push once  dextrose 50% Injectable 25 Gram(s) IV Push once  dextrose 50% Injectable 25 Gram(s) IV Push once  docusate sodium 100 milliGRAM(s) Oral three times a day  escitalopram 10 milliGRAM(s) Oral daily  heparin  Injectable 5000 Unit(s) SubCutaneous every 12 hours  insulin lispro (HumaLOG) corrective regimen sliding scale   SubCutaneous three times a day before meals  insulin lispro (HumaLOG) corrective regimen sliding scale   SubCutaneous at bedtime  mirtazapine 15 milliGRAM(s) Oral at bedtime  multivitamin 1 Tablet(s) Oral daily  pantoprazole    Tablet 40 milliGRAM(s) Oral before breakfast  polyethylene glycol 3350 17 Gram(s) Oral two times a day  senna 2 Tablet(s) Oral at bedtime    MEDICATIONS  (PRN):  dextrose 40% Gel 15 Gram(s) Oral once PRN Blood Glucose LESS THAN 70 milliGRAM(s)/deciliter  glucagon  Injectable 1 milliGRAM(s) IntraMuscular once PRN Glucose LESS THAN 70 milligrams/deciliter  ondansetron Injectable 4 milliGRAM(s) IV Push every 6 hours PRN Nausea      Care Discussed with Consultants/Other Providers [x] YES  [ ] NO    HEALTH ISSUES - PROBLEM Dx:  Depression, unspecified depression type: Depression, unspecified depression type  Type 2 diabetes mellitus without complication, without long-term current use of insulin: Type 2 diabetes mellitus without complication, without long-term current use of insulin  Anemia, unspecified type: Anemia, unspecified type  CKD (chronic kidney disease) stage 3, GFR 30-59 ml/min: CKD (chronic kidney disease) stage 3, GFR 30-59 ml/min  Troponin level elevated: Troponin level elevated  Dysphagia, unspecified type: Dysphagia, unspecified type  Proctitis: Proctitis
Patient is a 87y old  Male who presents with a chief complaint of abd pain, weight loss (20 Oct 2019 11:19)      SUBJECTIVE / OVERNIGHT EVENTS:    Patient seen and examined. denies cp sob. some pain with swallowing but tolerating PO.      Vital Signs Last 24 Hrs  T(C): 36.8 (21 Oct 2019 04:18), Max: 37.3 (20 Oct 2019 20:25)  T(F): 98.2 (21 Oct 2019 04:18), Max: 99.1 (20 Oct 2019 20:25)  HR: 77 (21 Oct 2019 04:18) (77 - 83)  BP: 104/54 (21 Oct 2019 04:18) (104/54 - 116/72)  BP(mean): --  RR: 18 (21 Oct 2019 04:18) (18 - 18)  SpO2: 95% (21 Oct 2019 04:18) (95% - 97%)  I&O's Summary    20 Oct 2019 07:01  -  21 Oct 2019 07:00  --------------------------------------------------------  IN: 240 mL / OUT: 150 mL / NET: 90 mL    21 Oct 2019 07:01  -  21 Oct 2019 11:53  --------------------------------------------------------  IN: 240 mL / OUT: 0 mL / NET: 240 mL        PE:  GENERAL: NAD, AAOx1 frail  HEAD:  Atraumatic, Normocephalic  EYES: EOMI, PERRLA, conjunctiva and sclera clear  NECK: Supple, No JVD  CHEST/LUNG: CTABL, No wheeze  HEART: Regular rate and rhythm; nomurmur  ABDOMEN: Soft, Nontender, Nondistended; Bowel sounds present  EXTREMITIES:  2+ Peripheral Pulses, No clubbing, cyanosis, or edema  SKIN: No rashes or lesions  NEURO: No focal deficits      LABS:                        10.3   9.44  )-----------( 208      ( 20 Oct 2019 05:20 )             32.9     10-21    143  |  98  |  16  ----------------------------<  116<H>  3.4<L>   |  29  |  1.22    Ca    8.1<L>      21 Oct 2019 07:11        CAPILLARY BLOOD GLUCOSE      POCT Blood Glucose.: 105 mg/dL (21 Oct 2019 08:40)  POCT Blood Glucose.: 142 mg/dL (20 Oct 2019 21:44)  POCT Blood Glucose.: 135 mg/dL (20 Oct 2019 17:12)  POCT Blood Glucose.: 131 mg/dL (20 Oct 2019 12:47)            RADIOLOGY & ADDITIONAL TESTS:    Imaging Personally Reviewed:  [x] YES  [ ] NO    Consultant(s) Notes Reviewed:  [x] YES  [ ] NO    MEDICATIONS  (STANDING):  aspirin enteric coated 81 milliGRAM(s) Oral daily  clonazePAM  Tablet 0.5 milliGRAM(s) Oral at bedtime  dextrose 5%. 1000 milliLiter(s) (50 mL/Hr) IV Continuous <Continuous>  dextrose 50% Injectable 12.5 Gram(s) IV Push once  dextrose 50% Injectable 25 Gram(s) IV Push once  dextrose 50% Injectable 25 Gram(s) IV Push once  docusate sodium 100 milliGRAM(s) Oral three times a day  escitalopram 10 milliGRAM(s) Oral daily  heparin  Injectable 5000 Unit(s) SubCutaneous every 12 hours  insulin lispro (HumaLOG) corrective regimen sliding scale   SubCutaneous three times a day before meals  insulin lispro (HumaLOG) corrective regimen sliding scale   SubCutaneous at bedtime  mirtazapine 15 milliGRAM(s) Oral at bedtime  multivitamin 1 Tablet(s) Oral daily  pantoprazole   Suspension 40 milliGRAM(s) Oral daily  polyethylene glycol 3350 17 Gram(s) Oral two times a day  senna 2 Tablet(s) Oral at bedtime    MEDICATIONS  (PRN):  dextrose 40% Gel 15 Gram(s) Oral once PRN Blood Glucose LESS THAN 70 milliGRAM(s)/deciliter  glucagon  Injectable 1 milliGRAM(s) IntraMuscular once PRN Glucose LESS THAN 70 milligrams/deciliter  ondansetron Injectable 4 milliGRAM(s) IV Push every 6 hours PRN Nausea      Care Discussed with Consultants/Other Providers [x] YES  [ ] NO    HEALTH ISSUES - PROBLEM Dx:  Depression, unspecified depression type: Depression, unspecified depression type  Type 2 diabetes mellitus without complication, without long-term current use of insulin: Type 2 diabetes mellitus without complication, without long-term current use of insulin  Anemia, unspecified type: Anemia, unspecified type  CKD (chronic kidney disease) stage 3, GFR 30-59 ml/min: CKD (chronic kidney disease) stage 3, GFR 30-59 ml/min  Troponin level elevated: Troponin level elevated  Dysphagia, unspecified type: Dysphagia, unspecified type  Proctitis: Proctitis
Tolerating PO - feels "lump" in throat but no dysphagia  +BM yesterday    Vital Signs Last 24 Hrs  T(C): 37.1 (21 Oct 2019 11:58), Max: 37.3 (20 Oct 2019 20:25)  T(F): 98.8 (21 Oct 2019 11:58), Max: 99.1 (20 Oct 2019 20:25)  HR: 87 (21 Oct 2019 11:58) (77 - 87)  BP: 126/69 (21 Oct 2019 11:58) (104/54 - 126/69)  BP(mean): --  RR: 18 (21 Oct 2019 11:58) (18 - 18)  SpO2: 95% (21 Oct 2019 11:58) (95% - 97%)    PHYSICAL EXAM:    Constitutional: NAD, well-developed  Neck: No LAD, supple  Respiratory: CTA and P  Cardiovascular: S1 and S2, RRR, no M  Gastrointestinal: BS+, soft, NT/ND, neg HSM,  Extremities: No peripheral edema, neg clubing, cyanosis  Vascular: 2+ peripheral pulses  Neurological: A/O x 3, no focal deficits  Psychiatric: Normal mood, normal affect  Skin: No rashes    MEDICATIONS  (STANDING):  aspirin enteric coated 81 milliGRAM(s) Oral daily  clonazePAM  Tablet 0.5 milliGRAM(s) Oral at bedtime  dextrose 5%. 1000 milliLiter(s) (50 mL/Hr) IV Continuous <Continuous>  dextrose 50% Injectable 12.5 Gram(s) IV Push once  dextrose 50% Injectable 25 Gram(s) IV Push once  dextrose 50% Injectable 25 Gram(s) IV Push once  docusate sodium 100 milliGRAM(s) Oral three times a day  escitalopram 10 milliGRAM(s) Oral daily  heparin  Injectable 5000 Unit(s) SubCutaneous every 12 hours  insulin lispro (HumaLOG) corrective regimen sliding scale   SubCutaneous three times a day before meals  insulin lispro (HumaLOG) corrective regimen sliding scale   SubCutaneous at bedtime  mirtazapine 15 milliGRAM(s) Oral at bedtime  multivitamin 1 Tablet(s) Oral daily  pantoprazole   Suspension 40 milliGRAM(s) Oral daily  polyethylene glycol 3350 17 Gram(s) Oral two times a day  senna 2 Tablet(s) Oral at bedtime    MEDICATIONS  (PRN):  dextrose 40% Gel 15 Gram(s) Oral once PRN Blood Glucose LESS THAN 70 milliGRAM(s)/deciliter  glucagon  Injectable 1 milliGRAM(s) IntraMuscular once PRN Glucose LESS THAN 70 milligrams/deciliter  ondansetron Injectable 4 milliGRAM(s) IV Push every 6 hours PRN Nausea      Allergies    penicillin (Rash)  quinidine (Unknown)    Intolerances          LABS:                        10.3   9.44  )-----------( 208      ( 20 Oct 2019 05:20 )             32.9                         10.7   12.24 )-----------( 212      ( 19 Oct 2019 06:19 )             34.3     10-21    143  |  98  |  16  ----------------------------<  116<H>  3.4<L>   |  29  |  1.22    Ca    8.1<L>      21 Oct 2019 07:11                RADIOLOGY & ADDITIONAL TESTS:

## 2019-10-21 NOTE — CHART NOTE - NSCHARTNOTEFT_GEN_A_CORE
chart note prior to discharge. pt pending discharge to [facility]. he reports having difficulty swallowing the nectar consistency car, juices and shakes. the liquids are getting stuck in his throat. he is requesting a liquid consistency diet advance. encouraged pt to discuss this with the staff at [facility]. he reports consuming 25% of his meals.

## 2019-10-21 NOTE — PROGRESS NOTE ADULT - PROBLEM SELECTOR PLAN 3
Patient denies any chest pain , SOB or any concerning signs for ACS  Trop previous elevated to 40s as well likely sec to renal dysfunction.   Would not trend further unless patient has symptoms. No

## 2019-10-21 NOTE — DISCHARGE NOTE PROVIDER - CARE PROVIDER_API CALL
Dez Guerra)  Internal Medicine  [address], [address]  [place] [address]  Phone: [phone]  Fax: [phone]  Follow Up Time:     Elena Rivas)  Cardiovascular Disease; Internal Medicine  [address] [place] [address], [address]  [place] [address]  Phone: [phone]  Fax: [phone]  Follow Up Time:

## 2019-10-21 NOTE — PROGRESS NOTE ADULT - REASON FOR ADMISSION
abd pain, weight loss

## 2019-10-21 NOTE — DISCHARGE NOTE PROVIDER - HOSPITAL COURSE
86 yo m with h/o HTN, HLD, DM2, pancreatitis, basal/squamous cell carcinoma s/p Mohs surg, depression, anxiety presented from assisted living facility c/o abd pain ;CT with evidence of thickened rectum, Suspect related to constipation. appreciate GI recs. s/p egd and flex sig mild candidial esophagitis, small esophageal ulcer, 2 large rectal ulcer, sterocoral colitis; Cont with bowel regimen with miralax bid and high fiber diet. pt having bowel movements.  s/p MBS, aspiration with thin liquids dysphagia 3 diet with thicken liquids.         Noted troponin elevated.  Patient denies any chest pain , SOB or any concerning signs for ACS. Trop previous elevated to 40s as well likely sec to renal dysfunction. Would not trend further unless patient has symptoms. Pt with (chronic kidney disease) stage 3, Cr at baseline (1.39-1.4s) . Discharged to [facility].

## 2019-10-21 NOTE — DISCHARGE NOTE NURSING/CASE MANAGEMENT/SOCIAL WORK - PATIENT PORTAL LINK FT
You can access the FollowMyHealth Patient Portal offered by [facility] by registering at the following website: [email or web address]. By joining [employer]’s FollowMyHealth portal, you will also be able to view your health information using other applications (apps) compatible with our system.

## 2019-10-30 ENCOUNTER — FORM ENCOUNTER (OUTPATIENT)
Age: 84
End: 2019-10-30

## 2019-11-12 PROCEDURE — 82550 ASSAY OF CK (CPK): CPT

## 2019-11-12 PROCEDURE — 74177 CT ABD & PELVIS W/CONTRAST: CPT

## 2019-11-12 PROCEDURE — 81001 URINALYSIS AUTO W/SCOPE: CPT

## 2019-11-12 PROCEDURE — 82607 VITAMIN B-12: CPT

## 2019-11-12 PROCEDURE — 83735 ASSAY OF MAGNESIUM: CPT

## 2019-11-12 PROCEDURE — 84100 ASSAY OF PHOSPHORUS: CPT

## 2019-11-12 PROCEDURE — 71045 X-RAY EXAM CHEST 1 VIEW: CPT

## 2019-11-12 PROCEDURE — 97161 PT EVAL LOW COMPLEX 20 MIN: CPT

## 2019-11-12 PROCEDURE — 92610 EVALUATE SWALLOWING FUNCTION: CPT

## 2019-11-12 PROCEDURE — 80048 BASIC METABOLIC PNL TOTAL CA: CPT

## 2019-11-12 PROCEDURE — 82962 GLUCOSE BLOOD TEST: CPT

## 2019-11-12 PROCEDURE — 93005 ELECTROCARDIOGRAM TRACING: CPT

## 2019-11-12 PROCEDURE — 83550 IRON BINDING TEST: CPT

## 2019-11-12 PROCEDURE — 92611 MOTION FLUOROSCOPY/SWALLOW: CPT

## 2019-11-12 PROCEDURE — 85027 COMPLETE CBC AUTOMATED: CPT

## 2019-11-12 PROCEDURE — 84484 ASSAY OF TROPONIN QUANT: CPT

## 2019-11-12 PROCEDURE — 83690 ASSAY OF LIPASE: CPT

## 2019-11-12 PROCEDURE — 82553 CREATINE MB FRACTION: CPT

## 2019-11-12 PROCEDURE — 83540 ASSAY OF IRON: CPT

## 2019-11-12 PROCEDURE — 80053 COMPREHEN METABOLIC PANEL: CPT

## 2019-11-12 PROCEDURE — 99285 EMERGENCY DEPT VISIT HI MDM: CPT

## 2019-11-12 PROCEDURE — 88312 SPECIAL STAINS GROUP 1: CPT

## 2019-11-12 PROCEDURE — 74230 X-RAY XM SWLNG FUNCJ C+: CPT

## 2019-11-12 PROCEDURE — 83036 HEMOGLOBIN GLYCOSYLATED A1C: CPT

## 2019-11-12 PROCEDURE — 84443 ASSAY THYROID STIM HORMONE: CPT

## 2019-11-12 PROCEDURE — 88305 TISSUE EXAM BY PATHOLOGIST: CPT

## 2019-11-12 PROCEDURE — 82746 ASSAY OF FOLIC ACID SERUM: CPT

## 2019-11-13 ENCOUNTER — FORM ENCOUNTER (OUTPATIENT)
Age: 84
End: 2019-11-13

## 2019-12-16 ENCOUNTER — INPATIENT (INPATIENT)
Facility: HOSPITAL | Age: 84
LOS: 2 days | Discharge: HOME | DRG: 640 | End: 2019-12-19
Attending: INTERNAL MEDICINE | Admitting: HOSPITALIST
Payer: MEDICARE

## 2019-12-16 VITALS
DIASTOLIC BLOOD PRESSURE: 66 MMHG | RESPIRATION RATE: 18 BRPM | TEMPERATURE: 97 F | OXYGEN SATURATION: 100 % | SYSTOLIC BLOOD PRESSURE: 147 MMHG | HEART RATE: 60 BPM

## 2019-12-16 DIAGNOSIS — R62.7 ADULT FAILURE TO THRIVE: ICD-10-CM

## 2019-12-16 DIAGNOSIS — E16.2 HYPOGLYCEMIA, UNSPECIFIED: ICD-10-CM
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
## 2019-12-16 DIAGNOSIS — E11.22 TYPE 2 DIABETES MELLITUS WITH DIABETIC CHRONIC KIDNEY DISEASE: ICD-10-CM

## 2019-12-16 DIAGNOSIS — F41.9 ANXIETY DISORDER, UNSPECIFIED: ICD-10-CM

## 2019-12-16 DIAGNOSIS — R13.14 DYSPHAGIA, PHARYNGOESOPHAGEAL PHASE: ICD-10-CM

## 2019-12-16 DIAGNOSIS — Z29.9 ENCOUNTER FOR PROPHYLACTIC MEASURES, UNSPECIFIED: ICD-10-CM

## 2019-12-16 DIAGNOSIS — I24.8 OTHER FORMS OF ACUTE ISCHEMIC HEART DISEASE: ICD-10-CM

## 2019-12-16 DIAGNOSIS — S09.90XA UNSPECIFIED INJURY OF HEAD, INITIAL ENCOUNTER: ICD-10-CM

## 2019-12-16 DIAGNOSIS — N18.3 CHRONIC KIDNEY DISEASE, STAGE 3 (MODERATE): ICD-10-CM

## 2019-12-16 DIAGNOSIS — Z85.828 PERSONAL HISTORY OF OTHER MALIGNANT NEOPLASM OF SKIN: Chronic | ICD-10-CM

## 2019-12-16 DIAGNOSIS — Z02.9 ENCOUNTER FOR ADMINISTRATIVE EXAMINATIONS, UNSPECIFIED: ICD-10-CM

## 2019-12-16 LAB
ALBUMIN SERPL ELPH-MCNC: 3.1 G/DL — LOW (ref 3.3–5)
ALP SERPL-CCNC: 100 U/L — SIGNIFICANT CHANGE UP (ref 40–120)
ALT FLD-CCNC: 10 U/L — SIGNIFICANT CHANGE UP (ref 10–45)
ANION GAP SERPL CALC-SCNC: 12 MMOL/L — SIGNIFICANT CHANGE UP (ref 5–17)
APPEARANCE UR: CLEAR — SIGNIFICANT CHANGE UP
APTT BLD: 31.7 SEC — SIGNIFICANT CHANGE UP (ref 27.5–36.3)
AST SERPL-CCNC: 16 U/L — SIGNIFICANT CHANGE UP (ref 10–40)
BACTERIA # UR AUTO: NEGATIVE — SIGNIFICANT CHANGE UP
BASE EXCESS BLDV CALC-SCNC: 1.9 MMOL/L — SIGNIFICANT CHANGE UP (ref -2–2)
BASOPHILS # BLD AUTO: 0.03 K/UL — SIGNIFICANT CHANGE UP (ref 0–0.2)
BASOPHILS NFR BLD AUTO: 0.4 % — SIGNIFICANT CHANGE UP (ref 0–2)
BILIRUB SERPL-MCNC: 0.4 MG/DL — SIGNIFICANT CHANGE UP (ref 0.2–1.2)
BILIRUB UR-MCNC: NEGATIVE — SIGNIFICANT CHANGE UP
BUN SERPL-MCNC: 18 MG/DL — SIGNIFICANT CHANGE UP (ref 7–23)
CA-I SERPL-SCNC: 1.23 MMOL/L — SIGNIFICANT CHANGE UP (ref 1.12–1.3)
CALCIUM SERPL-MCNC: 8.4 MG/DL — SIGNIFICANT CHANGE UP (ref 8.4–10.5)
CHLORIDE BLDV-SCNC: 101 MMOL/L — SIGNIFICANT CHANGE UP (ref 96–108)
CHLORIDE SERPL-SCNC: 99 MMOL/L — SIGNIFICANT CHANGE UP (ref 96–108)
CO2 BLDV-SCNC: 30 MMOL/L — SIGNIFICANT CHANGE UP (ref 22–30)
CO2 SERPL-SCNC: 25 MMOL/L — SIGNIFICANT CHANGE UP (ref 22–31)
COLOR SPEC: COLORLESS — SIGNIFICANT CHANGE UP
CREAT SERPL-MCNC: 1.38 MG/DL — HIGH (ref 0.5–1.3)
DIFF PNL FLD: NEGATIVE — SIGNIFICANT CHANGE UP
EOSINOPHIL # BLD AUTO: 0.09 K/UL — SIGNIFICANT CHANGE UP (ref 0–0.5)
EOSINOPHIL NFR BLD AUTO: 1.3 % — SIGNIFICANT CHANGE UP (ref 0–6)
EPI CELLS # UR: 0 /HPF — SIGNIFICANT CHANGE UP
ETHANOL SERPL-MCNC: SIGNIFICANT CHANGE UP MG/DL (ref 0–10)
GAS PNL BLDV: 137 MMOL/L — SIGNIFICANT CHANGE UP (ref 135–145)
GAS PNL BLDV: SIGNIFICANT CHANGE UP
GLUCOSE BLDV-MCNC: 183 MG/DL — HIGH (ref 70–99)
GLUCOSE SERPL-MCNC: 184 MG/DL — HIGH (ref 70–99)
GLUCOSE UR QL: NEGATIVE — SIGNIFICANT CHANGE UP
HCO3 BLDV-SCNC: 28 MMOL/L — SIGNIFICANT CHANGE UP (ref 21–29)
HCT VFR BLD CALC: 35 % — LOW (ref 39–50)
HCT VFR BLDA CALC: 36 % — LOW (ref 39–50)
HGB BLD CALC-MCNC: 11.8 G/DL — LOW (ref 13–17)
HGB BLD-MCNC: 11.3 G/DL — LOW (ref 13–17)
HYALINE CASTS # UR AUTO: 0 /LPF — SIGNIFICANT CHANGE UP (ref 0–2)
IMM GRANULOCYTES NFR BLD AUTO: 0.4 % — SIGNIFICANT CHANGE UP (ref 0–1.5)
INR BLD: 1.17 RATIO — HIGH (ref 0.88–1.16)
KETONES UR-MCNC: NEGATIVE — SIGNIFICANT CHANGE UP
LACTATE BLDV-MCNC: 1.1 MMOL/L — SIGNIFICANT CHANGE UP (ref 0.7–2)
LEUKOCYTE ESTERASE UR-ACNC: NEGATIVE — SIGNIFICANT CHANGE UP
LIDOCAIN IGE QN: 8 U/L — SIGNIFICANT CHANGE UP (ref 7–60)
LYMPHOCYTES # BLD AUTO: 1.7 K/UL — SIGNIFICANT CHANGE UP (ref 1–3.3)
LYMPHOCYTES # BLD AUTO: 24.5 % — SIGNIFICANT CHANGE UP (ref 13–44)
MCHC RBC-ENTMCNC: 32.3 GM/DL — SIGNIFICANT CHANGE UP (ref 32–36)
MCHC RBC-ENTMCNC: 33.6 PG — SIGNIFICANT CHANGE UP (ref 27–34)
MCV RBC AUTO: 104.2 FL — HIGH (ref 80–100)
MONOCYTES # BLD AUTO: 0.56 K/UL — SIGNIFICANT CHANGE UP (ref 0–0.9)
MONOCYTES NFR BLD AUTO: 8.1 % — SIGNIFICANT CHANGE UP (ref 2–14)
NEUTROPHILS # BLD AUTO: 4.54 K/UL — SIGNIFICANT CHANGE UP (ref 1.8–7.4)
NEUTROPHILS NFR BLD AUTO: 65.3 % — SIGNIFICANT CHANGE UP (ref 43–77)
NITRITE UR-MCNC: NEGATIVE — SIGNIFICANT CHANGE UP
NRBC # BLD: 0 /100 WBCS — SIGNIFICANT CHANGE UP (ref 0–0)
OTHER CELLS CSF MANUAL: 2 ML/DL — LOW (ref 18–22)
PCO2 BLDV: 57 MMHG — HIGH (ref 35–50)
PH BLDV: 7.32 — LOW (ref 7.35–7.45)
PH UR: 6 — SIGNIFICANT CHANGE UP (ref 5–8)
PLATELET # BLD AUTO: 212 K/UL — SIGNIFICANT CHANGE UP (ref 150–400)
PO2 BLDV: <20 MMHG — LOW (ref 25–45)
POTASSIUM BLDV-SCNC: 4 MMOL/L — SIGNIFICANT CHANGE UP (ref 3.5–5.3)
POTASSIUM SERPL-MCNC: 4.1 MMOL/L — SIGNIFICANT CHANGE UP (ref 3.5–5.3)
POTASSIUM SERPL-SCNC: 4.1 MMOL/L — SIGNIFICANT CHANGE UP (ref 3.5–5.3)
PROT SERPL-MCNC: 6.3 G/DL — SIGNIFICANT CHANGE UP (ref 6–8.3)
PROT UR-MCNC: NEGATIVE — SIGNIFICANT CHANGE UP
PROTHROM AB SERPL-ACNC: 13.5 SEC — HIGH (ref 10–12.9)
RAPID RVP RESULT: SIGNIFICANT CHANGE UP
RBC # BLD: 3.36 M/UL — LOW (ref 4.2–5.8)
RBC # FLD: 12.6 % — SIGNIFICANT CHANGE UP (ref 10.3–14.5)
RBC CASTS # UR COMP ASSIST: 0 /HPF — SIGNIFICANT CHANGE UP (ref 0–4)
SAO2 % BLDV: 12 % — LOW (ref 67–88)
SODIUM SERPL-SCNC: 136 MMOL/L — SIGNIFICANT CHANGE UP (ref 135–145)
SP GR SPEC: 1.01 — LOW (ref 1.01–1.02)
T3 SERPL-MCNC: 66 NG/DL — LOW (ref 80–200)
T4 AB SER-ACNC: 5.4 UG/DL — SIGNIFICANT CHANGE UP (ref 4.6–12)
TROPONIN T, HIGH SENSITIVITY RESULT: 55 NG/L — HIGH (ref 0–51)
TROPONIN T, HIGH SENSITIVITY RESULT: 60 NG/L — HIGH (ref 0–51)
TSH SERPL-MCNC: 2.1 UIU/ML — SIGNIFICANT CHANGE UP (ref 0.27–4.2)
UROBILINOGEN FLD QL: NEGATIVE — SIGNIFICANT CHANGE UP
WBC # BLD: 6.95 K/UL — SIGNIFICANT CHANGE UP (ref 3.8–10.5)
WBC # FLD AUTO: 6.95 K/UL — SIGNIFICANT CHANGE UP (ref 3.8–10.5)
WBC UR QL: 0 /HPF — SIGNIFICANT CHANGE UP (ref 0–5)

## 2019-12-16 PROCEDURE — 99223 1ST HOSP IP/OBS HIGH 75: CPT | Mod: AI

## 2019-12-16 PROCEDURE — 99223 1ST HOSP IP/OBS HIGH 75: CPT | Mod: GC

## 2019-12-16 PROCEDURE — 70450 CT HEAD/BRAIN W/O DYE: CPT | Mod: 26

## 2019-12-16 PROCEDURE — 71045 X-RAY EXAM CHEST 1 VIEW: CPT | Mod: 26

## 2019-12-16 PROCEDURE — 99285 EMERGENCY DEPT VISIT HI MDM: CPT | Mod: GC

## 2019-12-16 RX ORDER — SODIUM CHLORIDE 9 MG/ML
1000 INJECTION INTRAMUSCULAR; INTRAVENOUS; SUBCUTANEOUS ONCE
Refills: 0 | Status: COMPLETED | OUTPATIENT
Start: 2019-12-16 | End: 2019-12-16

## 2019-12-16 RX ORDER — ESCITALOPRAM OXALATE 10 MG/1
1 TABLET, FILM COATED ORAL
Qty: 0 | Refills: 0 | DISCHARGE

## 2019-12-16 RX ORDER — HEPARIN SODIUM 5000 [USP'U]/ML
5000 INJECTION INTRAVENOUS; SUBCUTANEOUS EVERY 12 HOURS
Refills: 0 | Status: DISCONTINUED | OUTPATIENT
Start: 2019-12-16 | End: 2019-12-19

## 2019-12-16 RX ORDER — FERROUS SULFATE 325(65) MG
2 TABLET ORAL
Qty: 0 | Refills: 0 | DISCHARGE

## 2019-12-16 RX ORDER — NYSTATIN 500MM UNIT
500000 POWDER (EA) MISCELLANEOUS
Refills: 0 | Status: DISCONTINUED | OUTPATIENT
Start: 2019-12-16 | End: 2019-12-18

## 2019-12-16 RX ORDER — SENNA PLUS 8.6 MG/1
2 TABLET ORAL
Qty: 0 | Refills: 0 | DISCHARGE

## 2019-12-16 RX ORDER — ESCITALOPRAM OXALATE 10 MG/1
10 TABLET, FILM COATED ORAL DAILY
Refills: 0 | Status: DISCONTINUED | OUTPATIENT
Start: 2019-12-16 | End: 2019-12-19

## 2019-12-16 RX ORDER — TETANUS TOXOID, REDUCED DIPHTHERIA TOXOID AND ACELLULAR PERTUSSIS VACCINE, ADSORBED 5; 2.5; 8; 8; 2.5 [IU]/.5ML; [IU]/.5ML; UG/.5ML; UG/.5ML; UG/.5ML
0.5 SUSPENSION INTRAMUSCULAR ONCE
Refills: 0 | Status: COMPLETED | OUTPATIENT
Start: 2019-12-16 | End: 2019-12-16

## 2019-12-16 RX ORDER — SODIUM CHLORIDE 9 MG/ML
1000 INJECTION, SOLUTION INTRAVENOUS
Refills: 0 | Status: DISCONTINUED | OUTPATIENT
Start: 2019-12-16 | End: 2019-12-19

## 2019-12-16 RX ORDER — GLUCAGON INJECTION, SOLUTION 0.5 MG/.1ML
1 INJECTION, SOLUTION SUBCUTANEOUS ONCE
Refills: 0 | Status: DISCONTINUED | OUTPATIENT
Start: 2019-12-16 | End: 2019-12-19

## 2019-12-16 RX ORDER — DEXTROSE 50 % IN WATER 50 %
25 SYRINGE (ML) INTRAVENOUS ONCE
Refills: 0 | Status: DISCONTINUED | OUTPATIENT
Start: 2019-12-16 | End: 2019-12-19

## 2019-12-16 RX ORDER — ASPIRIN/CALCIUM CARB/MAGNESIUM 324 MG
1 TABLET ORAL
Qty: 0 | Refills: 0 | DISCHARGE

## 2019-12-16 RX ORDER — DEXTROSE 50 % IN WATER 50 %
15 SYRINGE (ML) INTRAVENOUS ONCE
Refills: 0 | Status: DISCONTINUED | OUTPATIENT
Start: 2019-12-16 | End: 2019-12-19

## 2019-12-16 RX ORDER — DEXTROSE 50 % IN WATER 50 %
12.5 SYRINGE (ML) INTRAVENOUS ONCE
Refills: 0 | Status: DISCONTINUED | OUTPATIENT
Start: 2019-12-16 | End: 2019-12-19

## 2019-12-16 RX ORDER — BACITRACIN ZINC 500 UNIT/G
1 OINTMENT IN PACKET (EA) TOPICAL DAILY
Refills: 0 | Status: DISCONTINUED | OUTPATIENT
Start: 2019-12-16 | End: 2019-12-19

## 2019-12-16 RX ORDER — ASPIRIN/CALCIUM CARB/MAGNESIUM 324 MG
81 TABLET ORAL DAILY
Refills: 0 | Status: DISCONTINUED | OUTPATIENT
Start: 2019-12-16 | End: 2019-12-19

## 2019-12-16 RX ORDER — MIRTAZAPINE 45 MG/1
1 TABLET, ORALLY DISINTEGRATING ORAL
Qty: 0 | Refills: 0 | DISCHARGE

## 2019-12-16 RX ORDER — CLONAZEPAM 1 MG
1 TABLET ORAL
Qty: 0 | Refills: 0 | DISCHARGE

## 2019-12-16 RX ORDER — PANTOPRAZOLE SODIUM 20 MG/1
40 TABLET, DELAYED RELEASE ORAL
Refills: 0 | Status: DISCONTINUED | OUTPATIENT
Start: 2019-12-16 | End: 2019-12-19

## 2019-12-16 RX ORDER — CLONAZEPAM 1 MG
0.5 TABLET ORAL AT BEDTIME
Refills: 0 | Status: DISCONTINUED | OUTPATIENT
Start: 2019-12-16 | End: 2019-12-19

## 2019-12-16 RX ORDER — MIRTAZAPINE 45 MG/1
15 TABLET, ORALLY DISINTEGRATING ORAL AT BEDTIME
Refills: 0 | Status: DISCONTINUED | OUTPATIENT
Start: 2019-12-16 | End: 2019-12-19

## 2019-12-16 RX ADMIN — MIRTAZAPINE 15 MILLIGRAM(S): 45 TABLET, ORALLY DISINTEGRATING ORAL at 21:23

## 2019-12-16 RX ADMIN — TETANUS TOXOID, REDUCED DIPHTHERIA TOXOID AND ACELLULAR PERTUSSIS VACCINE, ADSORBED 0.5 MILLILITER(S): 5; 2.5; 8; 8; 2.5 SUSPENSION INTRAMUSCULAR at 11:22

## 2019-12-16 RX ADMIN — HEPARIN SODIUM 5000 UNIT(S): 5000 INJECTION INTRAVENOUS; SUBCUTANEOUS at 18:26

## 2019-12-16 RX ADMIN — Medication 1 APPLICATION(S): at 21:23

## 2019-12-16 RX ADMIN — Medication 0.5 MILLIGRAM(S): at 21:23

## 2019-12-16 RX ADMIN — Medication 500000 UNIT(S): at 21:22

## 2019-12-16 RX ADMIN — SODIUM CHLORIDE 1000 MILLILITER(S): 9 INJECTION INTRAMUSCULAR; INTRAVENOUS; SUBCUTANEOUS at 09:44

## 2019-12-16 RX ADMIN — Medication 1 TABLET(S): at 18:26

## 2019-12-16 RX ADMIN — Medication 81 MILLIGRAM(S): at 18:26

## 2019-12-16 RX ADMIN — ESCITALOPRAM OXALATE 10 MILLIGRAM(S): 10 TABLET, FILM COATED ORAL at 18:26

## 2019-12-16 NOTE — H&P ADULT - PROBLEM SELECTOR PLAN 10
Transitions of Care Status:  1.  Name of PCP: mercedes  2.  PCP Contacted on Admission: [ ] Y    [ x] N  retired; family looking for new Geriatrician  3.  PCP contacted at Discharge: [ ] Y    [ ] N    [ ] N/A  4.  Post-Discharge Appointment Date and Location:  5.  Summary of Handoff given to PCP:

## 2019-12-16 NOTE — ED PROVIDER NOTE - CARE PLAN
Principal Discharge DX:	Failure to thrive in adult  Secondary Diagnosis:	DIONTE (acute kidney injury)  Secondary Diagnosis:	Closed head injury  Secondary Diagnosis:	Weight loss

## 2019-12-16 NOTE — ED PROVIDER NOTE - ATTENDING CONTRIBUTION TO CARE
Dr. Interiano (Attending Physician)  I performed a history and physical exam of the patient and discussed their management with the resident. I reviewed the resident's note and agree with the documented findings and plan of care. My medical decision making and observations are found above.

## 2019-12-16 NOTE — H&P ADULT - NSHPLABSRESULTS_GEN_ALL_CORE
Hemoglobin: 11.3 g/dL <L> ( 09:31)  RBC Count: 3.36 M/uL <L> ( 09:31)  Hematocrit: 35.0 % <L> ( 09:31)  WBC Count: 6.95 K/uL ( 09:31)  Platelet Count - Automated: 212 K/uL ( @ 09:31)          136  |  99  |  18  ----------------------------<  184<H>  4.1   |  25  |  1.38<H>    Ca    8.4      16 Dec 2019 09:31    TPro  6.3  /  Alb  3.1<L>  /  TBili  0.4  /  DBili  x   /  AST  16  /  ALT  10  /  AlkPhos  100            CARDIAC MARKERS ( 16 Dec 2019 12:41 )  x     / x     / 129 U/L / x     / x            PT/INR - ( 16 Dec 2019 09:31 )   PT: 13.5 sec;   INR: 1.17 ratio         PTT - ( 16 Dec 2019 09:31 )  PTT:31.7 sec    Urinalysis Basic - ( 16 Dec 2019 12:41 )    Color: Colorless / Appearance: Clear / S.007 / pH: x  Gluc: x / Ketone: Negative  / Bili: Negative / Urobili: Negative   Blood: x / Protein: Negative / Nitrite: Negative   Leuk Esterase: Negative / RBC: 0 /hpf / WBC 0 /HPF   Sq Epi: x / Non Sq Epi: 0 /hpf / Bacteria: Negative        Bilirubin Total, Serum: 0.4 mg/dL ( @ 09:31)  Alkaline Phosphatase, Serum: 100 U/L ( @ 09:31)  Alanine Aminotransferase (ALT/SGPT): 10 U/L ( @ 09:31)  Albumin, Serum: 3.1 g/dL <L> ( @ 09:31)  Aspartate Aminotransferase (AST/SGOT): 16 U/L ( @ 09:31)            CT head: no bleed  CXR clear  EKG: wavy baseline bifascicular block, no acute ischemic changes

## 2019-12-16 NOTE — ED PROVIDER NOTE - MUSCULOSKELETAL, MLM
Spine appears normal, range of motion is not limited, no muscle or joint tenderness 5/5 strength in distal extremities b/l. no spinal ttp.

## 2019-12-16 NOTE — ED PROVIDER NOTE - OBJECTIVE STATEMENT
87M BIBEMS from [facility] s/p fall. Patient found down per EMS with contusion on his forehead. Thud was found and patient was immediately found responsive on ground. Patient is poor historian although states he remembers the entire event but is unclear why he fell. States that he just "went over." Reports some r sided rib pain but denies cp, sob, dizziness, abd pain, weakness, sensory changes, no other complaints. EMS noted FS in the field was 60 and he got dextrose. Per son, patient has lost approximately 30 pounds in the past month and has not been eating well. No other febrile illnesses.

## 2019-12-16 NOTE — ED PROVIDER NOTE - PROGRESS NOTE DETAILS
Cyril Posey: [phone]  PMD Elena Rivas Ag Pgy3: son notes that patient is [facility] but requesting to admit to [facility] and to be set up with out geriatricians. Also patient's pmd recently retired.

## 2019-12-16 NOTE — CONSULT NOTE ADULT - ASSESSMENT
88 yo m with h/o HTN, HLD, DM2, pancreatitis, basal/squamous cell carcinoma s/p Mohs surg, depression and anxiety who presented from the [facility] after a fall s/p Ct head w/ no evidence of bleed who was recently admitted in October for abdominal pain and dysphagia s/p EGD and flex sig that was positive for esophageal candidiasis and H. pylori gastritis now s/p treatment.  Pt also underwent swallow therapy after aspiration w/ thin liquids on MBS now able to tolerate soft foods and thin liquids who is now still endorsing oropharyngeal dysphagia concerning for persistent esophageal candidiasis     # Oropharyngeal Dysphagia   DDX: infectious w/ persistent esophageal candidiasis vs structural vs neurological 86 yo m with h/o HTN, HLD, DM2, pancreatitis, basal/squamous cell carcinoma s/p Mohs surg, depression and anxiety who presented from the [facility] after a fall s/p Ct head w/ no evidence of bleed who was recently admitted in October for abdominal pain and dysphagia s/p EGD and flex sig that was positive for esophageal candidiasis and H. pylori gastritis now s/p treatment s/p MBS and speech therapy now on soft foods w/ thin liquids who is now representing with oropharyngeal dysphagia     Recommendations:  # Oropharyngeal Dysphagia   DDX: Neurological vs Infectious vs structural   Pt likely has component of dementia; currently AAOx 2 (self, location but not time)  - Please place pt on dysphagia 3 diet w/ honey thick liquids and re-order S&S evaluation  - if S&S eval positive for oropharyngeal dysphagia pt can follow up outpatient for further evaluation    May also be 2/2 persistent esophageal candidiasis however no thrush or white plaques noted on physical exam  - will confirm w/ [facility] the duration of diflucan  - if S&S eval normal can consider EGD to evaluate for persistent esophageal candidiasis     less likely structural as recent EGD w/ no evidence of strictures, no Zenkers noted on MBS     Talya Limon MD  Internal Medicine, PGY-2 88 yo m with h/o HTN, HLD, DM2, pancreatitis, basal/squamous cell carcinoma s/p Mohs surg, depression and anxiety who presented from the [facility] after a fall s/p Ct head w/ no evidence of bleed who was recently admitted in October for abdominal pain and dysphagia s/p EGD and flex sig that was positive for esophageal candidiasis and H. pylori gastritis now s/p treatment s/p MBS and speech therapy now on soft foods w/ thin liquids who is now representing with oropharyngeal dysphagia     Recommendations:  # Oropharyngeal Dysphagia, vs esophageal dysphagia  DDX: Neurological vs Infectious vs structural  Pt likely has component of dementia; currently AAOx 2 (self, location but not time)  - Please place pt on dysphagia 3 diet w/ honey thick liquids and re-order S&S evaluation  - if S&S eval positive for oropharyngeal dysphagia pt can follow up outpatient for further evaluation    May also be 2/2 persistent esophageal candidiasis however no thrush or white plaques noted on physical exam  - will confirm w/ [facility] the duration of diflucan  - if S&S eval normal can consider repeat EGD to evaluate for persistent esophageal candidiasis   - Ultimately if speech and swallow is unremarkable, and EGD is negative, eventual motility study can be considered    less likely structural as recent EGD w/ no evidence of strictures, no Zenkers noted on MBS     Talya Limon MD  Internal Medicine, PGY-2

## 2019-12-16 NOTE — ED ADULT NURSE NOTE - INTERVENTIONS DEFINITIONS
Monitor gait and stability/Monitor for mental status changes and reorient to person, place, and time/Non-slip footwear when patient is off stretcher/Physically safe environment: no spills, clutter or unnecessary equipment/[place] to call system/Stretcher in lowest position, wheels locked, appropriate side rails in place

## 2019-12-16 NOTE — ED ADULT NURSE REASSESSMENT NOTE - NS ED NURSE REASSESS COMMENT FT1
Patient assisted to bedside toilet. Son at bedside. No change in mental status, VSS, afebrile. Patient currently eating meal. Awaiting bed at this time. NSR on CM.

## 2019-12-16 NOTE — H&P ADULT - HISTORY OF PRESENT ILLNESS
87M BIBEMS from [facility] s/p unwitnessed fall. Patient found down per EMS with contusion on his forehead. No syncope. Patient is poor historian although states he remembers the entire event but is unclear why he fell. States that he just "went over." Perhaps mild light headedness preceding. h/o frequent falls. denies cp, sob, abd pain, weakness, sensory changes, no other complaints. EMS noted FS in the field was 60 and he got dextrose. Per son, patient has lost approximately 30 pounds in the past month and has not been eating well. No other febrile illnesses. son reports mild runny nose but no cough or fever. patient requesting to hold laxatives due to loose stool.  patient reports mild dysphagia in throat.  poor appetite    recent EGD/flex sig showed candidal esophagitis/gastritis/PUD/stercoral colitis

## 2019-12-16 NOTE — ED ADULT NURSE NOTE - NS ED NOTE  TALK SOMEONE YN
From: Mike Weiss  To: Stefanie Hernandez MD  Sent: 5/7/2017 1:03 PM CDT  Subject: Non-Urgent Medical Question    Hello--I got a voicemail that I needed to come in for a med check. Here are some of my recent BP readings.   4/21 122/83  4/29 140/84
No

## 2019-12-16 NOTE — H&P ADULT - PROBLEM SELECTOR PLAN 5
SLT eval  soft diet  GI eval due to weight loss and concern for persistent candidal esophagitis - consult emailed.  Nystatin swish and swallow for possible oral thrush

## 2019-12-16 NOTE — ED PROVIDER NOTE - CARDIAC, MLM
Normal rate, regular rhythm.  Heart sounds S1, S2.  No murmurs, rubs or gallops. 2+ pulses in distal ex

## 2019-12-16 NOTE — CONSULT NOTE ADULT - SUBJECTIVE AND OBJECTIVE BOX
Patient is a 87y old  Male who presents with a chief complaint of fall (16 Dec 2019 13:46)    HPI:  86 yo M [facility] unwitnessed fall. Patient found down per EMS with contusion on his forehead. No syncope. Patient is poor historian although states he remembers the entire event but is unclear why he fell. States that he just "went over." Perhaps mild light headedness preceding. h/o frequent falls. denies cp, sob, abd pain, weakness, sensory changes, no other complaints. EMS noted FS in the field was 60 and he got dextrose. Per son, patient has lost approximately 30 pounds in the past month and has not been eating well. No other febrile illnesses. son reports mild runny nose but no cough or fever. patient requesting to hold laxatives due to loose stool.  patient reports mild dysphagia in throat.   poor appetite    recent EGD/flex sig showed candidal esophagitis/gastritis/PUD/stercoral colitis (16 Dec 2019 13:46)      Consult HPI:       Allergies    penicillin (Rash)  quinidine (Unknown)    Intolerances      MEDICATIONS  (STANDING):  aspirin enteric coated 81 milliGRAM(s) Oral daily  BACItracin   Ointment 1 Application(s) Topical daily  clonazePAM  Tablet 0.5 milliGRAM(s) Oral at bedtime  dextrose 5%. 1000 milliLiter(s) (50 mL/Hr) IV Continuous <Continuous>  dextrose 50% Injectable 12.5 Gram(s) IV Push once  dextrose 50% Injectable 25 Gram(s) IV Push once  dextrose 50% Injectable 25 Gram(s) IV Push once  escitalopram 10 milliGRAM(s) Oral daily  heparin  Injectable 5000 Unit(s) SubCutaneous every 12 hours  mirtazapine 15 milliGRAM(s) Oral at bedtime  multivitamin 1 Tablet(s) Oral daily  nystatin    Suspension 662014 Unit(s) Oral four times a day  pantoprazole    Tablet 40 milliGRAM(s) Oral before breakfast    MEDICATIONS  (PRN):  dextrose 40% Gel 15 Gram(s) Oral once PRN Blood Glucose LESS THAN 70 milliGRAM(s)/deciLiter  glucagon  Injectable 1 milliGRAM(s) IntraMuscular once PRN Glucose <70 milliGRAM(s)/deciLiter      PAST MEDICAL & SURGICAL HISTORY:  Squamous cell carcinoma  Basal cell carcinoma of skin  Pancreatitis: years ago  Diabetes mellitus: type 2  Hyperlipidemia  Anxiety  Depression  H/O Moh's micrographic surgery for skin cancer: 4/30/18 right cheek  Renal cyst surgery in 2008  Inguinal hernia s/p repair (L)    FAMILY HISTORY:  FH: brain tumor  FH: diabetes mellitus      REVIEW OF SYSTEMS  All review of systems negative, except for those marked:  Constitutional:   No fever, no fatigue, no pallor.   HEENT:   No eye pain, no vision changes, no icterus, no mouth ulcers.  Respiratory:   No shortness of breath, no cough, no respiratory distress.   Cardiovascular:   No chest pain, no palpitations.   Skin:   No rashes, no jaundice, no eczema.   Musculoskeletal:   No joint pain, no swelling, no myalgia.   Neurologic:   No headache, no seizure, no weakness.   Genitourinary:   No dysuria, no decreased urine output.  Psychiatric:  No depression, no anxiety, no PDD, no ADHD.  Endocrine:   No thyroid disease, no diabetes.  Heme/Lymphatic:   No anemia, no blood transfusions, no lymph node enlargement, no bleeding, no bruising.    Daily     Daily   BMI: 24.3 (10-15 @ 11:43)  Change in Weight:  Vital Signs Last 24 Hrs  T(C): 36.7 (16 Dec 2019 12:40), Max: 36.7 (16 Dec 2019 12:40)  T(F): 98 (16 Dec 2019 12:40), Max: 98 (16 Dec 2019 12:40)  HR: 68 (16 Dec 2019 12:40) (60 - 73)  BP: 110/71 (16 Dec 2019 12:40) (110/71 - 147/66)  BP(mean): --  RR: 19 (16 Dec 2019 12:40) (18 - 19)  SpO2: 98% (16 Dec 2019 12:40) (98% - 100%)  I&O's Detail      PHYSICAL EXAM  General:  Well developed, well nourished, alert and active, no pallor, NAD.  HEENT:    Normal appearance of conjunctiva, ears, nose, lips, oropharynx, and oral mucosa, anicteric.  Neck:  No masses, no asymmetry.  Lymph Nodes:  No lymphadenopathy.   Cardiovascular:  RRR normal S1/S2, no murmur.  Respiratory:  CTA B/L, normal respiratory effort.   Abdominal:   soft, no masses or tenderness, normoactive BS, NT/ND, no HSM.  Extremities:   No clubbing or cyanosis, normal capillary refill, no edema.   Skin:   No rash, jaundice, lesions, eczema.   Musculoskeletal:  No joint swelling, erythema or tenderness.   Neuro: No focal deficits.   Other:     Lab Results:                        11.3   6.95  )-----------( 212      ( 16 Dec 2019 09:31 )             35.0     12-16    136  |  99  |  18  ----------------------------<  184<H>  4.1   |  25  |  1.38<H>    Ca    8.4      16 Dec 2019 09:31    TPro  6.3  /  Alb  3.1<L>  /  TBili  0.4  /  DBili  x   /  AST  16  /  ALT  10  /  AlkPhos  100  12-16    LIVER FUNCTIONS - ( 16 Dec 2019 09:31 )  Alb: 3.1 g/dL / Pro: 6.3 g/dL / ALK PHOS: 100 U/L / ALT: 10 U/L / AST: 16 U/L / GGT: x           PT/INR - ( 16 Dec 2019 09:31 )   PT: 13.5 sec;   INR: 1.17 ratio         PTT - ( 16 Dec 2019 09:31 )  PTT:31.7 sec      Stool Results:          RADIOLOGY RESULTS:  < from: CT Head No Cont (12.16.19 @ 10:13) >    IMPRESSION: Age-appropriate involutional andischemic gliotic changes. No   hemorrhage. No change from 1/23/2019.     < end of copied text >    < from: Upper Endoscopy (10.18.19 @ 08:13) >  Impression:  - Non-bleeding esophageal ulcer. Biopsied.                       - White nummular lesions in esophageal mucosa. Cells for cytology obtained.                       - A single gastric polyp. Biopsied.                       - Nodular mucosa in the entire stomach. Biopsied.          - Duodenal erosions without bleeding.  Recommendation:      - [facility]                       - Resume previous diet.                       - Use Protonix (pantoprazole) 40 mg PO daily.       - Telephone my office for pathology results in 1 week. [phone]    < end of copied text >    < from: Flexible Sigmoidoscopy (10.18.19 @ 08:13) >  Impression:  - Preparation of the colon was inadequate for screening.                       - Two largeulcers in the rectum, likely secondary to stercoral colitis.                        Biopsied.                       - A few medium-mouthed diverticula were found in the sigmoid colon.                       - Benign polypoid lesion ?lymphoid aggregatein the recto-sigmoid colon.                        Complete removal was accomplished.  Recommendation:   - [facility].                       - High fiber diet.                       - Miralax 1 capful (17 grams) in 8 ounces of water PO BID.                       - Telephone my office for pathology results in 1 week. [phone]    < end of copied text >      SURGICAL PATHOLOGY:   Final Diagnosis    1. Stomach, erosions, biopsy  - Active chronic gastritis with focal erosions and H. Pylori  organisms on Warthin-Starry stain    2. Stomach, random biopsy  - Active chronic gastritis with H. Pylori organisms on  Warthin-Starry stain  - Intestinal metaplasia  - Negative for dysplasia    3.  Esophagus, ulcer, biopsy  - Squamous epithelium with lymphocytic and focal, active  esophagitis  - Negative for viral cytopathic changes  - Fungus stain will be reported in an addendum    4. Sigmoid, polyp, biopsy  - Hyperplastic polyp    5. Rectum, ulcer, biopsy  - Squamocolumnar (anoderm with colonic/ rectal) mucosa with  focal active inflammation  - Negative for viral cytopathic changes Patient is a 87y old  Male who presents with a chief complaint of fall (16 Dec 2019 13:46)    HPI:  86 yo M B[facility]EMS [facility] unwitnessed fall. Patient found down per EMS with contusion on his forehead. No syncope. Patient is poor historian although states he remembers the entire event but is unclear why he fell. States that he just "went over." Perhaps mild light headedness preceding. h/o frequent falls. denies cp, sob, abd pain, weakness, sensory changes, no other complaints. EMS noted FS in the field was 60 and he got dextrose. Per son, patient has lost approximately 30 pounds in the past month and has not been eating well. No other febrile illnesses. son reports mild runny nose but no cough or fever. patient requesting to hold laxatives due to loose stool.  patient reports mild dysphagia in throat.   poor appetite    recent EGD/flex sig showed candidal esophagitis/gastritis/PUD/stercoral colitis (16 Dec 2019 13:46)      Consult HPI:       Allergies    penicillin (Rash)  quinidine (Unknown)    Intolerances      MEDICATIONS  (STANDING):  aspirin enteric coated 81 milliGRAM(s) Oral daily  BACItracin   Ointment 1 Application(s) Topical daily  clonazePAM  Tablet 0.5 milliGRAM(s) Oral at bedtime  dextrose 5%. 1000 milliLiter(s) (50 mL/Hr) IV Continuous <Continuous>  dextrose 50% Injectable 12.5 Gram(s) IV Push once  dextrose 50% Injectable 25 Gram(s) IV Push once  dextrose 50% Injectable 25 Gram(s) IV Push once  escitalopram 10 milliGRAM(s) Oral daily  heparin  Injectable 5000 Unit(s) SubCutaneous every 12 hours  mirtazapine 15 milliGRAM(s) Oral at bedtime  multivitamin 1 Tablet(s) Oral daily  nystatin    Suspension 292483 Unit(s) Oral four times a day  pantoprazole    Tablet 40 milliGRAM(s) Oral before breakfast    MEDICATIONS  (PRN):  dextrose 40% Gel 15 Gram(s) Oral once PRN Blood Glucose LESS THAN 70 milliGRAM(s)/deciLiter  glucagon  Injectable 1 milliGRAM(s) IntraMuscular once PRN Glucose <70 milliGRAM(s)/deciLiter      PAST MEDICAL & SURGICAL HISTORY:  Squamous cell carcinoma  Basal cell carcinoma of skin  Pancreatitis: years ago  Diabetes mellitus: type 2  Hyperlipidemia  Anxiety  Depression  H/O Moh's micrographic surgery for skin cancer: 4/30/18 right cheek  Renal cyst surgery in 2008  Inguinal hernia s/p repair (L)    FAMILY HISTORY:  FH: brain tumor  FH: diabetes mellitus      REVIEW OF SYSTEMS  All review of systems negative, except for those marked:  Constitutional:   No fever, no fatigue, no pallor.   HEENT:   No eye pain, no vision changes, no icterus, no mouth ulcers.  Respiratory:   No shortness of breath, no cough, no respiratory distress.   Cardiovascular:   No chest pain, no palpitations.   Skin:   No rashes, no jaundice, no eczema.   Musculoskeletal:   No joint pain, no swelling, no myalgia.   Neurologic:   No headache, no seizure, no weakness.   Genitourinary:   No dysuria, no decreased urine output.  Psychiatric:  No depression, no anxiety, no PDD, no ADHD.  Endocrine:   No thyroid disease, no diabetes.  Heme/Lymphatic:   No anemia, no blood transfusions, no lymph node enlargement, no bleeding, no bruising.    Daily     Daily   BMI: 24.3 (10-15 @ 11:43)  Change in Weight:  Vital Signs Last 24 Hrs  T(C): 36.7 (16 Dec 2019 12:40), Max: 36.7 (16 Dec 2019 12:40)  T(F): 98 (16 Dec 2019 12:40), Max: 98 (16 Dec 2019 12:40)  HR: 68 (16 Dec 2019 12:40) (60 - 73)  BP: 110/71 (16 Dec 2019 12:40) (110/71 - 147/66)  BP(mean): --  RR: 19 (16 Dec 2019 12:40) (18 - 19)  SpO2: 98% (16 Dec 2019 12:40) (98% - 100%)  I&O's Detail      PHYSICAL EXAM  General:  Well developed, well nourished, alert and active, no pallor, NAD.  HEENT:    Normal appearance of conjunctiva, ears, nose, lips, oropharynx, and oral mucosa, anicteric.  Neck:  No masses, no asymmetry.  Lymph Nodes:  No lymphadenopathy.   Cardiovascular:  RRR normal S1/S2, no murmur.  Respiratory:  CTA B/L, normal respiratory effort.   Abdominal:   soft, no masses or tenderness, normoactive BS, NT/ND, no HSM.  Extremities:   No clubbing or cyanosis, normal capillary refill, no edema.   Skin:   No rash, jaundice, lesions, eczema.   Musculoskeletal:  No joint swelling, erythema or tenderness.   Neuro: No focal deficits.   Other:     Lab Results:                        11.3   6.95  )-----------( 212      ( 16 Dec 2019 09:31 )             35.0     12-16    136  |  99  |  18  ----------------------------<  184<H>  4.1   |  25  |  1.38<H>    Ca    8.4      16 Dec 2019 09:31    TPro  6.3  /  Alb  3.1<L>  /  TBili  0.4  /  DBili  x   /  AST  16  /  ALT  10  /  AlkPhos  100  12-16    LIVER FUNCTIONS - ( 16 Dec 2019 09:31 )  Alb: 3.1 g/dL / Pro: 6.3 g/dL / ALK PHOS: 100 U/L / ALT: 10 U/L / AST: 16 U/L / GGT: x           PT/INR - ( 16 Dec 2019 09:31 )   PT: 13.5 sec;   INR: 1.17 ratio         PTT - ( 16 Dec 2019 09:31 )  PTT:31.7 sec      Stool Results:          RADIOLOGY RESULTS:  < from: CT Head No Cont (12.16.19 @ 10:13) >    IMPRESSION: Age-appropriate involutional andischemic gliotic changes. No   hemorrhage. No change from 1/23/2019.     < end of copied text >    < from: Upper Endoscopy (10.18.19 @ 08:13) >  Impression:  - Non-bleeding esophageal ulcer. Biopsied.                       - White nummular lesions in esophageal mucosa. Cells for cytology obtained.                       - A single gastric polyp. Biopsied.                       - Nodular mucosa in the entire stomach. Biopsied.          - Duodenal erosions without bleeding.  Recommendation:      - [facility].                       - Resume previous diet.                       - Use Protonix (pantoprazole) 40 mg PO daily.       - Telephone my office for pathology results in 1 week. [phone]    < end of copied text >    < from: Flexible Sigmoidoscopy (10.18.19 @ 08:13) >  Impression:  - Preparation of the colon was inadequate for screening.                       - Two largeulcers in the rectum, likely secondary to stercoral colitis.                        Biopsied.                       - A few medium-mouthed diverticula were found in the sigmoid colon.                       - Benign polypoid lesion ?lymphoid aggregatein the recto-sigmoid colon.                        Complete removal was accomplished.  Recommendation:   - [facility].                       - High fiber diet.                       - Miralax 1 capful (17 grams) in 8 ounces of water PO BID.                       - Telephone my office for pathology results in 1 week. [phone]    < end of copied text >      SURGICAL PATHOLOGY:   Final Diagnosis    1. Stomach, erosions, biopsy  - Active chronic gastritis with focal erosions and H. Pylori  organisms on Warthin-Starry stain    2. Stomach, random biopsy  - Active chronic gastritis with H. Pylori organisms on  Warthin-Starry stain  - Intestinal metaplasia  - Negative for dysplasia    3.  Esophagus, ulcer, biopsy  - Squamous epithelium with lymphocytic and focal, active  esophagitis  - Negative for viral cytopathic changes  - Fungus stain will be reported in an addendum    4. Sigmoid, polyp, biopsy  - Hyperplastic polyp    5. Rectum, ulcer, biopsy  - Squamocolumnar (anoderm with colonic/ rectal) mucosa with  focal active inflammation  - Negative for viral cytopathic changes    Cytopathology - Non Gyn Report:   ACCESSION No:  [id number]    ALYSON CRUZ                     1        Cytopathology Report            Specimen(s) Submitted  ESOPHAGUS, BRUSH      Clinical History  Nummular esophageal lesions. Rule out candidal esophagitis.      Gross Description  Received: 30 ml of clear fluid in CytoLyt with brushing  2 smears received (2 fixed)  Prepared: 1 Cell block, 1 GMS      Final Diagnosis  ESOPHAGUS, BRUSH  NEGATIVE FOR MALIGNANT CELLS. Patient is a 87y old  Male who presents with a chief complaint of fall (16 Dec 2019 13:46)    HPI:  86 yo M B[facility]EM[facility] unwitnessed fall. Patient found down per EMS with contusion on his forehead. No syncope. Patient is poor historian although states he remembers the entire event but is unclear why he fell. States that he just "went over." Perhaps mild light headedness preceding. h/o frequent falls. denies cp, sob, abd pain, weakness, sensory changes, no other complaints. EMS noted FS in the field was 60 and he got dextrose. Per son, patient has lost approximately 30 pounds in the past month and has not been eating well. No other febrile illnesses. son reports mild runny nose but no cough or fever. patient requesting to hold laxatives due to loose stool.  patient reports mild dysphagia in throat.   poor appetite    recent EGD/flex sig showed candidal esophagitis/gastritis/PUD/stercoral colitis (16 Dec 2019 13:46)      Consult HPI:   86 yo m with h/o HTN, HLD, DM2, pancreatitis, basal/squamous cell carcinoma s/p Mohs surg, depression, anxiety presented from assisted living facility presenting after fall.  CT head negative for bleed.  GI consulted because son noted that he had lost 30 lbs over the past month and has been complaining of dysphagia.  Pt recently admitted from 10/15 to 10/21 for abdominal pain w/u.  During that visit pt had abdominal CT w/ IV contrast that was revealing for sigmoid diverticulosis and mural thickening and pericolonic inflammatory change centered around the rectum.  Pt underwent an EGD and flex sig that was significant for mild candidal esophagitis, small esophageal ulcer, 2 large rectal ulcers that were attributed to be likely caused by stercoral colitis. Pt also had a MBS which showed aspiration w/ thin liquids.  Pt was discharged on a dysphagia 3 diet and laxatives; pt then underwent therapy from speech and swallow and can now tolerate soft foods w/ thin liquids.  Biopsy results of stomach erosions later revealed to be positive for H. pylori and pt was treated for both H. pylori his esophageal candidiasis under the care of Dr. Guerra per his son.  Also per son he had follow up care w/ [facility] who performed a urea breath test which confirmed eradication.  Per family pt's GI doctor also noted thyroid nodules and suggested a thyroid scan as well a thyroid test.  Doctor at [facility] Dr. Khan started pt on Marinol recently for appetite stimulation.         Allergies    penicillin (Rash)  quinidine (Unknown)    Intolerances      MEDICATIONS  (STANDING):  aspirin enteric coated 81 milliGRAM(s) Oral daily  BACItracin   Ointment 1 Application(s) Topical daily  clonazePAM  Tablet 0.5 milliGRAM(s) Oral at bedtime  dextrose 5%. 1000 milliLiter(s) (50 mL/Hr) IV Continuous <Continuous>  dextrose 50% Injectable 12.5 Gram(s) IV Push once  dextrose 50% Injectable 25 Gram(s) IV Push once  dextrose 50% Injectable 25 Gram(s) IV Push once  escitalopram 10 milliGRAM(s) Oral daily  heparin  Injectable 5000 Unit(s) SubCutaneous every 12 hours  mirtazapine 15 milliGRAM(s) Oral at bedtime  multivitamin 1 Tablet(s) Oral daily  nystatin    Suspension 213524 Unit(s) Oral four times a day  pantoprazole    Tablet 40 milliGRAM(s) Oral before breakfast  Marinol     MEDICATIONS  (PRN):  dextrose 40% Gel 15 Gram(s) Oral once PRN Blood Glucose LESS THAN 70 milliGRAM(s)/deciLiter  glucagon  Injectable 1 milliGRAM(s) IntraMuscular once PRN Glucose <70 milliGRAM(s)/deciLiter      PAST MEDICAL & SURGICAL HISTORY:  Squamous cell carcinoma  Basal cell carcinoma of skin  Pancreatitis: years ago  Diabetes mellitus: type 2  Hyperlipidemia  Anxiety  Depression  H/O Moh's micrographic surgery for skin cancer: 4/30/18 right cheek  Renal cyst surgery in 2008  Inguinal hernia s/p repair (L)    FAMILY HISTORY:  FH: brain tumor  FH: diabetes mellitus      REVIEW OF SYSTEMS  All review of systems negative, except for those marked:  Constitutional:   No fever, no fatigue, no pallor.   HEENT:   No eye pain, no vision changes, no icterus, no mouth ulcers.  Respiratory:   No shortness of breath, no cough, no respiratory distress.   Cardiovascular:   No chest pain, no palpitations.   Skin:   No rashes, no jaundice, no eczema.   Musculoskeletal:   No joint pain, no swelling, no myalgia.   Neurologic:   No headache, no seizure, no weakness.   Genitourinary:   No dysuria, no decreased urine output.  Psychiatric:  No depression, no anxiety, no PDD, no ADHD.  Endocrine:   No thyroid disease, no diabetes.  Heme/Lymphatic:   No anemia, no blood transfusions, no lymph node enlargement, no bleeding, no bruising.    Daily     Daily   BMI: 24.3 (10-15 @ 11:43)  Change in Weight:  Vital Signs Last 24 Hrs  T(C): 36.7 (16 Dec 2019 12:40), Max: 36.7 (16 Dec 2019 12:40)  T(F): 98 (16 Dec 2019 12:40), Max: 98 (16 Dec 2019 12:40)  HR: 68 (16 Dec 2019 12:40) (60 - 73)  BP: 110/71 (16 Dec 2019 12:40) (110/71 - 147/66)  BP(mean): --  RR: 19 (16 Dec 2019 12:40) (18 - 19)  SpO2: 98% (16 Dec 2019 12:40) (98% - 100%)  I&O's Detail      PHYSICAL EXAM  General:  Well developed, well nourished, alert and active, no pallor, NAD.  HEENT:    Normal appearance of conjunctiva, ears, nose, lips, oropharynx, and oral mucosa, anicteric.  Neck:  No masses, no asymmetry.  Lymph Nodes:  No lymphadenopathy.   Cardiovascular:  RRR normal S1/S2, no murmur.  Respiratory:  CTA B/L, normal respiratory effort.   Abdominal:   soft, no masses or tenderness, normoactive BS, NT/ND, no HSM.  Extremities:   No clubbing or cyanosis, normal capillary refill, no edema.   Skin:   No rash, jaundice, lesions, eczema.   Musculoskeletal:  No joint swelling, erythema or tenderness.   Neuro: No focal deficits.   Other:     Lab Results:                        11.3   6.95  )-----------( 212      ( 16 Dec 2019 09:31 )             35.0     12-16    136  |  99  |  18  ----------------------------<  184<H>  4.1   |  25  |  1.38<H>    Ca    8.4      16 Dec 2019 09:31    TPro  6.3  /  Alb  3.1<L>  /  TBili  0.4  /  DBili  x   /  AST  16  /  ALT  10  /  AlkPhos  100  12-16    LIVER FUNCTIONS - ( 16 Dec 2019 09:31 )  Alb: 3.1 g/dL / Pro: 6.3 g/dL / ALK PHOS: 100 U/L / ALT: 10 U/L / AST: 16 U/L / GGT: x           PT/INR - ( 16 Dec 2019 09:31 )   PT: 13.5 sec;   INR: 1.17 ratio         PTT - ( 16 Dec 2019 09:31 )  PTT:31.7 sec      Stool Results:          RADIOLOGY RESULTS:  < from: CT Head No Cont (12.16.19 @ 10:13) >    IMPRESSION: Age-appropriate involutional andischemic gliotic changes. No   hemorrhage. No change from 1/23/2019.     < end of copied text >    < from: Upper Endoscopy (10.18.19 @ 08:13) >  Impression:  - Non-bleeding esophageal ulcer. Biopsied.                       - White nummular lesions in esophageal mucosa. Cells for cytology obtained.                       - A single gastric polyp. Biopsied.                       - Nodular mucosa in the entire stomach. Biopsied.          - Duodenal erosions without bleeding.  Recommendation:      - [facility].                       - Resume previous diet.                       - Use Protonix (pantoprazole) 40 mg PO daily.       - Telephone my office for pathology results in 1 week. [phone]    < end of copied text >    < from: Flexible Sigmoidoscopy (10.18.19 @ 08:13) >  Impression:  - Preparation of the colon was inadequate for screening.                       - Two largeulcers in the rectum, likely secondary to stercoral colitis.                        Biopsied.                       - A few medium-mouthed diverticula were found in the sigmoid colon.                       - Benign polypoid lesion ?lymphoid aggregatein the recto-sigmoid colon.                        Complete removal was accomplished.  Recommendation:   - [facility] to [facility].                       - High fiber diet.                       - Miralax 1 capful (17 grams) in 8 ounces of water PO BID.                       - Telephone my office for pathology results in 1 week. [phone]    < end of copied text >      SURGICAL PATHOLOGY:   Final Diagnosis    1. Stomach, erosions, biopsy  - Active chronic gastritis with focal erosions and H. Pylori  organisms on Warthin-Starry stain    2. Stomach, random biopsy  - Active chronic gastritis with H. Pylori organisms on  Warthin-Starry stain  - Intestinal metaplasia  - Negative for dysplasia    3.  Esophagus, ulcer, biopsy  - Squamous epithelium with lymphocytic and focal, active  esophagitis  - Negative for viral cytopathic changes  - Fungus stain will be reported in an addendum    4. Sigmoid, polyp, biopsy  - Hyperplastic polyp    5. Rectum, ulcer, biopsy  - Squamocolumnar (anoderm with colonic/ rectal) mucosa with  focal active inflammation  - Negative for viral cytopathic changes    Cytopathology - Non Gyn Report:   ACCESSION No:  [id number]    ALYSON CRUZ                     1        Cytopathology Report          Cytopathology - Non Gyn Report:   ACCESSION No:  [id number]    ALYSON CRUZ                     1        Cytopathology Report            Specimen(s) Submitted  ESOPHAGUS, BRUSH      Clinical History  Nummular esophageal lesions. Rule out candidal esophagitis.      Gross Description  Received: 30 ml of clear fluid in CytoLyt with brushing  2 smears received (2 fixed)  Prepared: 1 Cell block, 1 GMS      Final Diagnosis  ESOPHAGUS, BRUSH  NEGATIVE FOR MALIGNANT CELLS.    Slides consist of benign and reactive squamous cells with acute  inflammation.    Cell block and GMS slides are noncontributory.    Please see concurrent biopsy specimen report [id number]    Screened by: Fouzia WALL (ASCP)  Verified by: LORETO SPENCE MD  (Electronic Signature)  Reported on: 10/21/19 14:48 EDT, [address] [facility]. [address] [facility],  [place] [address]  Cytology technical processing performed at [address] [place] [address], [place] [address]  _________________________________________________________________ (10.18.19 @ 20:17) Patient is a 87y old  Male who presents with a chief complaint of fall (16 Dec 2019 13:46)    HPI:  86 yo M [facility] unwitnessed [facility]. Patient found down per EMS with contusion on his forehead. No syncope. Patient is poor historian although states he remembers the entire event but is unclear why he fell. States that he just "went over." Perhaps mild light headedness preceding. h/o frequent falls. denies cp, sob, abd pain, weakness, sensory changes, no other complaints. EMS noted FS in the field was 60 and he got dextrose. Per son, patient has lost approximately 30 pounds in the past month and has not been eating well. No other febrile illnesses. son reports mild runny nose but no cough or fever. patient requesting to hold laxatives due to loose stool.  patient reports mild dysphagia in throat.   poor appetite    recent EGD/flex sig showed candidal esophagitis/gastritis/PUD/stercoral colitis (16 Dec 2019 13:46)      Consult HPI:   86 yo m with h/o HTN, HLD, DM2, pancreatitis, basal/squamous cell carcinoma s/p Mohs surg, depression, anxiety presented from assisted living facility presenting after fall.  CT head negative for bleed.  GI consulted because son noted that he had lost 30 lbs over the past month and has been complaining of dysphagia.  Pt recently admitted from 10/15 to 10/21 for abdominal pain w/u.  During that visit pt had abdominal CT w/ IV contrast that was revealing for sigmoid diverticulosis and mural thickening and pericolonic inflammatory change centered around the rectum.  Pt underwent an EGD and flex sig that was significant for mild candidal esophagitis, small esophageal ulcer, 2 large rectal ulcers that were attributed to be likely caused by stercoral colitis. Pt also had a MBS which showed aspiration w/ thin liquids.  Pt was discharged on a dysphagia 3 diet and laxatives; pt then underwent therapy from speech and swallow and can now tolerate soft foods w/ thin liquids.  Biopsy results of stomach erosions later revealed to be positive for H. pylori and pt was treated for both H. pylori his esophageal candidiasis under the care of Dr. Guerra per his son.  Also per son he had follow up care w/ [facility] who performed a urea breath test which confirmed eradication.  Per family pt's GI doctor also noted thyroid nodules and suggested a thyroid scan as well a thyroid test.  Doctor at [facility] Dr. Khan started pt on Marinol recently for appetite stimulation.         Allergies    penicillin (Rash)  quinidine (Unknown)    Intolerances      MEDICATIONS  (STANDING):  aspirin enteric coated 81 milliGRAM(s) Oral daily  BACItracin   Ointment 1 Application(s) Topical daily  clonazePAM  Tablet 0.5 milliGRAM(s) Oral at bedtime  dextrose 5%. 1000 milliLiter(s) (50 mL/Hr) IV Continuous <Continuous>  dextrose 50% Injectable 12.5 Gram(s) IV Push once  dextrose 50% Injectable 25 Gram(s) IV Push once  dextrose 50% Injectable 25 Gram(s) IV Push once  escitalopram 10 milliGRAM(s) Oral daily  heparin  Injectable 5000 Unit(s) SubCutaneous every 12 hours  mirtazapine 15 milliGRAM(s) Oral at bedtime  multivitamin 1 Tablet(s) Oral daily  nystatin    Suspension 853082 Unit(s) Oral four times a day  pantoprazole    Tablet 40 milliGRAM(s) Oral before breakfast  Marinol     MEDICATIONS  (PRN):  dextrose 40% Gel 15 Gram(s) Oral once PRN Blood Glucose LESS THAN 70 milliGRAM(s)/deciLiter  glucagon  Injectable 1 milliGRAM(s) IntraMuscular once PRN Glucose <70 milliGRAM(s)/deciLiter      PAST MEDICAL & SURGICAL HISTORY:  Squamous cell carcinoma  Basal cell carcinoma of skin  Pancreatitis: years ago  Diabetes mellitus: type 2  Hyperlipidemia  Anxiety  Depression  H/O Moh's micrographic surgery for skin cancer: 4/30/18 right cheek  Renal cyst surgery in 2008  Inguinal hernia s/p repair (L)    FAMILY HISTORY:  FH: brain tumor  FH: diabetes mellitus      REVIEW OF SYSTEMS  All review of systems negative, except for those marked:  Constitutional:   No fever, no fatigue, no pallor.   HEENT:   No eye pain, no vision changes, no icterus, no mouth ulcers.  Respiratory:   No shortness of breath, no cough, no respiratory distress.   Cardiovascular:   No chest pain, no palpitations.   Skin:   No rashes, no jaundice, no eczema.   Musculoskeletal:   No joint pain, no swelling, no myalgia.   Neurologic:   No headache, no seizure, no weakness.   Genitourinary:   No dysuria, no decreased urine output.  Psychiatric:  No depression, no anxiety, no PDD, no ADHD.  Endocrine:   No thyroid disease, no diabetes.  Heme/Lymphatic:   No anemia, no blood transfusions, no lymph node enlargement, no bleeding, no bruising.    Daily     Daily   BMI: 24.3 (10-15 @ 11:43)  Change in Weight:  Vital Signs Last 24 Hrs  T(C): 36.7 (16 Dec 2019 12:40), Max: 36.7 (16 Dec 2019 12:40)  T(F): 98 (16 Dec 2019 12:40), Max: 98 (16 Dec 2019 12:40)  HR: 68 (16 Dec 2019 12:40) (60 - 73)  BP: 110/71 (16 Dec 2019 12:40) (110/71 - 147/66)  BP(mean): --  RR: 19 (16 Dec 2019 12:40) (18 - 19)  SpO2: 98% (16 Dec 2019 12:40) (98% - 100%)  I&O's Detail      PHYSICAL EXAM  General:  thin, no pallor, NAD.  HEENT:    Normal appearance of conjunctiva, ears, nose, lips, oropharynx, and oral mucosa, anicteric. No thrush noted   Neck:  No masses, no asymmetry.  Lymph Nodes:  No lymphadenopathy.   Cardiovascular:  RRR normal S1/S2, no murmur.  Respiratory:  CTA B/L, normal respiratory effort.   Abdominal:   soft, no masses or tenderness, normoactive BS, NT/ND, no HSM.  Extremities:   No clubbing or cyanosis, normal capillary refill, no edema.   Skin:   No rash, jaundice, lesions, eczema.   Musculoskeletal:  No joint swelling, erythema or tenderness.   Neuro: No focal deficits.   Other:     Lab Results:                        11.3   6.95  )-----------( 212      ( 16 Dec 2019 09:31 )             35.0     12-16    136  |  99  |  18  ----------------------------<  184<H>  4.1   |  25  |  1.38<H>    Ca    8.4      16 Dec 2019 09:31    TPro  6.3  /  Alb  3.1<L>  /  TBili  0.4  /  DBili  x   /  AST  16  /  ALT  10  /  AlkPhos  100  12-16    LIVER FUNCTIONS - ( 16 Dec 2019 09:31 )  Alb: 3.1 g/dL / Pro: 6.3 g/dL / ALK PHOS: 100 U/L / ALT: 10 U/L / AST: 16 U/L / GGT: x           PT/INR - ( 16 Dec 2019 09:31 )   PT: 13.5 sec;   INR: 1.17 ratio         PTT - ( 16 Dec 2019 09:31 )  PTT:31.7 sec      Stool Results:          RADIOLOGY RESULTS:  < from: CT Head No Cont (12.16.19 @ 10:13) >    IMPRESSION: Age-appropriate involutional andischemic gliotic changes. No   hemorrhage. No change from 1/23/2019.     < end of copied text >    < from: Upper Endoscopy (10.18.19 @ 08:13) >  Impression:  - Non-bleeding esophageal ulcer. Biopsied.                       - White nummular lesions in esophageal mucosa. Cells for cytology obtained.                       - A single gastric polyp. Biopsied.                       - Nodular mucosa in the entire stomach. Biopsied.          - Duodenal erosions without bleeding.  Recommendation:      - [facility]                       - Resume previous diet.                       - Use Protonix (pantoprazole) 40 mg PO daily.       - Telephone my office for pathology results in 1 week. [phone]    < end of copied text >    < from: Flexible Sigmoidoscopy (10.18.19 @ 08:13) >  Impression:  - Preparation of the colon was inadequate for screening.                       - Two largeulcers in the rectum, likely secondary to stercoral colitis.                        Biopsied.                       - A few medium-mouthed diverticula were found in the sigmoid colon.                       - Benign polypoid lesion ?lymphoid aggregatein the recto-sigmoid colon.                        Complete removal was accomplished.  Recommendation:   - [facility] for [facility].                       - High fiber diet.                       - Miralax 1 capful (17 grams) in 8 ounces of water PO BID.                       - Telephone my office for pathology results in 1 week. [phone]    < end of copied text >    Addendum  3. Esophagus, ulcer  - GMS stain shows fungal hyphae.    Verified by: Niki Paulson M.D.  (Electronic Signature)  Reported on: 10/23/19 16:18 EDT, [address] [facility]. [address] [address]    SURGICAL PATHOLOGY:   Final Diagnosis    1. Stomach, erosions, biopsy  - Active chronic gastritis with focal erosions and H. Pylori  organisms on Warthin-Starry stain    2. Stomach, random biopsy  - Active chronic gastritis with H. Pylori organisms on  Warthin-Starry stain  - Intestinal metaplasia  - Negative for dysplasia    3.  Esophagus, ulcer, biopsy  - Squamous epithelium with lymphocytic and focal, active  esophagitis  - Negative for viral cytopathic changes  - Fungus stain will be reported in an addendum    4. Sigmoid, polyp, biopsy  - Hyperplastic polyp    5. Rectum, ulcer, biopsy  - Squamocolumnar (anoderm with colonic/ rectal) mucosa with  focal active inflammation  - Negative for viral cytopathic changes    Cytopathology - Non Gyn Report:   ACCESSION No:  [id number]    ALYSON CRUZ                     1        Cytopathology Report          Cytopathology - Non Gyn Report:   ACCESSION No:  [id number]    ALYSON CRUZ                     1        Cytopathology Report            Specimen(s) Submitted  ESOPHAGUS, BRUSH      Clinical History  Nummular esophageal lesions. Rule out candidal esophagitis.      Gross Description  Received: 30 ml of clear fluid in CytoLyt with brushing  2 smears received (2 fixed)  Prepared: 1 Cell block, 1 GMS      Final Diagnosis  ESOPHAGUS, BRUSH  NEGATIVE FOR MALIGNANT CELLS.    Slides consist of benign and reactive squamous cells with acute  inflammation.    Cell block and GMS slides are noncontributory.    Please see concurrent biopsy specimen report [id number]    Screened by: Fouzia WALL (ASCP)  Verified by: LORETO SPENCE MD  (Electronic Signature)  Reported on: 10/21/19 14:48 EDT, [address] [facility]. [address] [place] [id number]  Cytology technical processing performed at [address] [place] [address], [place] [address]  _________________________________________________________________ (10.18.19 @ 20:17) Patient is a 87y old  Male who presents with a chief complaint of fall (16 Dec 2019 13:46)    HPI:  88 yo M [facility]ed [facility]. Patient found down per EMS with contusion on his forehead. No syncope. Patient is poor historian although states he remembers the entire event but is unclear why he fell. States that he just "went over." Perhaps mild light headedness preceding. h/o frequent falls. denies cp, sob, abd pain, weakness, sensory changes, no other complaints. EMS noted FS in the field was 60 and he got dextrose. Per son, patient has lost approximately 30 pounds in the past month and has not been eating well. No other febrile illnesses. son reports mild runny nose but no cough or fever. patient requesting to hold laxatives due to loose stool.  patient reports mild dysphagia in throat.   poor appetite    recent EGD/flex sig showed candidal esophagitis/gastritis/PUD/stercoral colitis (16 Dec 2019 13:46)      Consult HPI:   88 yo m with h/o HTN, HLD, DM2, pancreatitis, basal/squamous cell carcinoma s/p Mohs surg, depression and anxiety who is presenting from assisted living facility after fall.  CT head negative for bleed.  GI consulted because son noted that he had lost 30 lbs over the past month and has been complaining of dysphagia.  Pt recently admitted from 10/15 to 10/21 for abdominal pain w/u.  During that visit pt had abdominal CT w/ IV contrast that was revealing for sigmoid diverticulosis and mural thickening as well as pericolonic inflammatory change centered around the rectum.  Pt underwent an EGD and flex sig that was significant for mild candidal esophagitis, small esophageal ulcer and 2 large rectal ulcers that were attributed to stercoral colitis. Pt also had a MBS which showed aspiration w/ thin liquids.  Pt was discharged on a dysphagia 3 diet and laxatives; pt then underwent therapy from speech and swallow and was switched to soft foods w/ thin liquids.  Pt denies coughing or choking after eating new diet but reports improved symptoms on previous dyspagia 3 diet w/ honey thick consistency.     Biopsy results of stomach erosions later revealed to be positive for H. pylori and pt was treated for both H. pylori his esophageal candidiasis under the care of Dr. Guerra per his son.  Unclear duration of diflucan however per daughter he had follow up care w/ [facility] who performed a urea breath test which confirmed eradication. Doctor at [facility] Dr. Khan started pt on Marinol recently for appetite stimulation.         Allergies    penicillin (Rash)  quinidine (Unknown)    Intolerances      MEDICATIONS  (STANDING):  aspirin enteric coated 81 milliGRAM(s) Oral daily  BACItracin   Ointment 1 Application(s) Topical daily  clonazePAM  Tablet 0.5 milliGRAM(s) Oral at bedtime  dextrose 5%. 1000 milliLiter(s) (50 mL/Hr) IV Continuous <Continuous>  dextrose 50% Injectable 12.5 Gram(s) IV Push once  dextrose 50% Injectable 25 Gram(s) IV Push once  dextrose 50% Injectable 25 Gram(s) IV Push once  escitalopram 10 milliGRAM(s) Oral daily  heparin  Injectable 5000 Unit(s) SubCutaneous every 12 hours  mirtazapine 15 milliGRAM(s) Oral at bedtime  multivitamin 1 Tablet(s) Oral daily  nystatin    Suspension 132709 Unit(s) Oral four times a day  pantoprazole    Tablet 40 milliGRAM(s) Oral before breakfast  Marinol     MEDICATIONS  (PRN):  dextrose 40% Gel 15 Gram(s) Oral once PRN Blood Glucose LESS THAN 70 milliGRAM(s)/deciLiter  glucagon  Injectable 1 milliGRAM(s) IntraMuscular once PRN Glucose <70 milliGRAM(s)/deciLiter      PAST MEDICAL & SURGICAL HISTORY:  Squamous cell carcinoma  Basal cell carcinoma of skin  Pancreatitis: years ago  Diabetes mellitus: type 2  Hyperlipidemia  Anxiety  Depression  H/O Moh's micrographic surgery for skin cancer: 4/30/18 right cheek  Renal cyst surgery in 2008  Inguinal hernia s/p repair (L)    FAMILY HISTORY:  FH: brain tumor  FH: diabetes mellitus      REVIEW OF SYSTEMS  All review of systems negative, except for those marked:  Constitutional:   No fever, no fatigue, no pallor.   HEENT:   No eye pain, no vision changes, no icterus, no mouth ulcers.  Respiratory:   No shortness of breath, no cough, no respiratory distress.   Cardiovascular:   No chest pain, no palpitations.   Skin:   No rashes, no jaundice, no eczema.   Musculoskeletal:   No joint pain, no swelling, no myalgia.   Neurologic:   No headache, no seizure, no weakness.   Genitourinary:   No dysuria, no decreased urine output.  Psychiatric:  No depression, no anxiety, no PDD, no ADHD.  Endocrine:   No thyroid disease, no diabetes.  Heme/Lymphatic:   No anemia, no blood transfusions, no lymph node enlargement, no bleeding, no bruising.    Daily     Daily   BMI: 24.3 (10-15 @ 11:43)  Change in Weight:  Vital Signs Last 24 Hrs  T(C): 36.7 (16 Dec 2019 12:40), Max: 36.7 (16 Dec 2019 12:40)  T(F): 98 (16 Dec 2019 12:40), Max: 98 (16 Dec 2019 12:40)  HR: 68 (16 Dec 2019 12:40) (60 - 73)  BP: 110/71 (16 Dec 2019 12:40) (110/71 - 147/66)  BP(mean): --  RR: 19 (16 Dec 2019 12:40) (18 - 19)  SpO2: 98% (16 Dec 2019 12:40) (98% - 100%)  I&O's Detail      PHYSICAL EXAM  General:  thin, no pallor, NAD.  HEENT:    Normal appearance of conjunctiva, ears, nose, lips, oropharynx, and oral mucosa, anicteric. No thrush noted   Neck:  No masses, no asymmetry.  Lymph Nodes:  No lymphadenopathy.   Cardiovascular:  RRR normal S1/S2, no murmur.  Respiratory:  CTA B/L, normal respiratory effort.   Abdominal:   soft, no masses or tenderness, normoactive BS, NT/ND, no HSM.  Extremities:   No clubbing or cyanosis, normal capillary refill, no edema.   Skin:   No rash, jaundice, lesions, eczema.   Musculoskeletal:  No joint swelling, erythema or tenderness.   Neuro: No focal deficits.   Other:     Lab Results:                        11.3   6.95  )-----------( 212      ( 16 Dec 2019 09:31 )             35.0     12-16    136  |  99  |  18  ----------------------------<  184<H>  4.1   |  25  |  1.38<H>    Ca    8.4      16 Dec 2019 09:31    TPro  6.3  /  Alb  3.1<L>  /  TBili  0.4  /  DBili  x   /  AST  16  /  ALT  10  /  AlkPhos  100  12-16    LIVER FUNCTIONS - ( 16 Dec 2019 09:31 )  Alb: 3.1 g/dL / Pro: 6.3 g/dL / ALK PHOS: 100 U/L / ALT: 10 U/L / AST: 16 U/L / GGT: x           PT/INR - ( 16 Dec 2019 09:31 )   PT: 13.5 sec;   INR: 1.17 ratio         PTT - ( 16 Dec 2019 09:31 )  PTT:31.7 sec      Stool Results:          RADIOLOGY RESULTS:  < from: CT Head No Cont (12.16.19 @ 10:13) >    IMPRESSION: Age-appropriate involutional andischemic gliotic changes. No   hemorrhage. No change from 1/23/2019.     < end of copied text >    < from: Upper Endoscopy (10.18.19 @ 08:13) >  Impression:  - Non-bleeding esophageal ulcer. Biopsied.                       - White nummular lesions in esophageal mucosa. Cells for cytology obtained.                       - A single gastric polyp. Biopsied.                       - Nodular mucosa in the entire stomach. Biopsied.          - Duodenal erosions without bleeding.  Recommendation:      - [facility].                       - Resume previous diet.                       - Use Protonix (pantoprazole) 40 mg PO daily.       - Telephone my office for pathology results in 1 week. [phone]    < end of copied text >    < from: Flexible Sigmoidoscopy (10.18.19 @ 08:13) >  Impression:  - Preparation of the colon was inadequate for screening.                       - Two largeulcers in the rectum, likely secondary to stercoral colitis.                        Biopsied.                       - A few medium-mouthed diverticula were found in the sigmoid colon.                       - Benign polypoid lesion ?lymphoid aggregatein the recto-sigmoid colon.                        Complete removal was accomplished.  Recommendation:   - [facility] to [facility] for [facility].                       - High fiber diet.                       - Miralax 1 capful (17 grams) in 8 ounces of water PO BID.                       - Telephone my office for pathology results in 1 week. [phone]    < end of copied text >    Addendum  3. Esophagus, ulcer  - GMS stain shows fungal hyphae.    Verified by: Niki Paulson M.D.  (Electronic Signature)  Reported on: 10/23/19 16:18 EDT, [address] [facility]. [address],  [place] [address]    SURGICAL PATHOLOGY:   Final Diagnosis    1. Stomach, erosions, biopsy  - Active chronic gastritis with focal erosions and H. Pylori  organisms on Warthin-Starry stain    2. Stomach, random biopsy  - Active chronic gastritis with H. Pylori organisms on  Warthin-Starry stain  - Intestinal metaplasia  - Negative for dysplasia    3.  Esophagus, ulcer, biopsy  - Squamous epithelium with lymphocytic and focal, active  esophagitis  - Negative for viral cytopathic changes  - Fungus stain will be reported in an addendum    4. Sigmoid, polyp, biopsy  - Hyperplastic polyp    5. Rectum, ulcer, biopsy  - Squamocolumnar (anoderm with colonic/ rectal) mucosa with  focal active inflammation  - Negative for viral cytopathic changes    Cytopathology - Non Gyn Report:   ACCESSION No:  [id number]    ALYSON CRUZ                     1        Cytopathology Report          Cytopathology - Non Gyn Report:   ACCESSION No:  [id number]    ALYSON CRUZ                     1        Cytopathology Report            Specimen(s) Submitted  ESOPHAGUS, BRUSH      Clinical History  Nummular esophageal lesions. Rule out candidal esophagitis.      Gross Description  Received: 30 ml of clear fluid in CytoLyt with brushing  2 smears received (2 fixed)  Prepared: 1 Cell block, 1 GMS      Final Diagnosis  ESOPHAGUS, BRUSH  NEGATIVE FOR MALIGNANT CELLS.    Slides consist of benign and reactive squamous cells with acute  inflammation.    Cell block and GMS slides are noncontributory.    Please see concurrent biopsy specimen report [id number]    Screened by: Fouzia WALL (ASCP)  Verified by: LORETO SPENCE MD  (Electronic Signature)  Reported on: 10/21/19 14:48 EDT, [address] [facility]. [address] [address]  Cytology technical processing performed at [address] [place] [id number]  _________________________________________________________________ (10.18.19 @ 20:17) Patient is a 87y old  Male who presents with a chief complaint of fall (16 Dec 2019 13:46)    HPI:  86 yo M [facility]ed [facility]. Patient found down per EMS with contusion on his forehead. No syncope. Patient is poor historian although states he remembers the entire event but is unclear why he fell. States that he just "went over." Perhaps mild light headedness preceding. h/o frequent falls. denies cp, sob, abd pain, weakness, sensory changes, no other complaints. EMS noted FS in the field was 60 and he got dextrose. Per son, patient has lost approximately 30 pounds in the past month and has not been eating well. No other febrile illnesses. son reports mild runny nose but no cough or fever. patient requesting to hold laxatives due to loose stool.  patient reports mild dysphagia in throat.   poor appetite    recent EGD/flex sig showed candidal esophagitis/gastritis/PUD/stercoral colitis (16 Dec 2019 13:46)      Consult HPI:   86 yo m with h/o HTN, HLD, DM2, pancreatitis, basal/squamous cell carcinoma s/p Mohs surg, depression and anxiety who is presenting from assisted living facility after fall.  CT head negative for bleed.  GI consulted because son noted that he had lost 30 lbs over the past month and has been complaining of dysphagia.  Pt recently admitted from 10/15 to 10/21 for abdominal pain w/u.  During that visit pt had abdominal CT w/ IV contrast that was revealing for sigmoid diverticulosis and mural thickening as well as pericolonic inflammatory change centered around the rectum.  Pt underwent an EGD and flex sig that was significant for mild candidal esophagitis, small esophageal ulcer and 2 large rectal ulcers that were attributed to stercoral colitis. Pt also had a MBS which showed aspiration w/ thin liquids.  Pt was discharged on a dysphagia 3 diet and laxatives; pt then underwent therapy from speech and swallow and was switched to soft foods w/ thin liquids.  Pt denies coughing or choking after eating new diet but reports improved symptoms on previous dyspagia 3 diet w/ honey thick consistency. He feels like food causes discomfort in the upper throat, not the chest or esophagus.    Biopsy results of stomach erosions later revealed to be positive for H. pylori and pt was treated for both H. pylori his esophageal candidiasis under the care of Dr. Guerra per his son.  Unclear duration of diflucan however per daughter he had follow up care w/ [facility] who performed a urea breath test which confirmed eradication. Doctor at [facility] Dr. Khan started pt on Marinol recently for appetite stimulation.         Allergies    penicillin (Rash)  quinidine (Unknown)    Intolerances      MEDICATIONS  (STANDING):  aspirin enteric coated 81 milliGRAM(s) Oral daily  BACItracin   Ointment 1 Application(s) Topical daily  clonazePAM  Tablet 0.5 milliGRAM(s) Oral at bedtime  dextrose 5%. 1000 milliLiter(s) (50 mL/Hr) IV Continuous <Continuous>  dextrose 50% Injectable 12.5 Gram(s) IV Push once  dextrose 50% Injectable 25 Gram(s) IV Push once  dextrose 50% Injectable 25 Gram(s) IV Push once  escitalopram 10 milliGRAM(s) Oral daily  heparin  Injectable 5000 Unit(s) SubCutaneous every 12 hours  mirtazapine 15 milliGRAM(s) Oral at bedtime  multivitamin 1 Tablet(s) Oral daily  nystatin    Suspension 819666 Unit(s) Oral four times a day  pantoprazole    Tablet 40 milliGRAM(s) Oral before breakfast  Marinol     MEDICATIONS  (PRN):  dextrose 40% Gel 15 Gram(s) Oral once PRN Blood Glucose LESS THAN 70 milliGRAM(s)/deciLiter  glucagon  Injectable 1 milliGRAM(s) IntraMuscular once PRN Glucose <70 milliGRAM(s)/deciLiter      PAST MEDICAL & SURGICAL HISTORY:  Squamous cell carcinoma  Basal cell carcinoma of skin  Pancreatitis: years ago  Diabetes mellitus: type 2  Hyperlipidemia  Anxiety  Depression  H/O Moh's micrographic surgery for skin cancer: 4/30/18 right cheek  Renal cyst surgery in 2008  Inguinal hernia s/p repair (L)    FAMILY HISTORY:  FH: brain tumor  FH: diabetes mellitus      REVIEW OF SYSTEMS  All review of systems negative, except for those marked:  Constitutional:   No fever, no fatigue, no pallor.   HEENT:   No eye pain, no vision changes, no icterus, no mouth ulcers.  Respiratory:   No shortness of breath, no cough, no respiratory distress.   Cardiovascular:   No chest pain, no palpitations.   Skin:   No rashes, no jaundice, no eczema.   Musculoskeletal:   No joint pain, no swelling, no myalgia.   Neurologic:   No headache, no seizure, no weakness.   Genitourinary:   No dysuria, no decreased urine output.  Psychiatric:  No depression, no anxiety, no PDD, no ADHD.  Endocrine:   No thyroid disease, no diabetes.  Heme/Lymphatic:   No anemia, no blood transfusions, no lymph node enlargement, no bleeding, no bruising.    Daily     Daily   BMI: 24.3 (10-15 @ 11:43)  Change in Weight:  Vital Signs Last 24 Hrs  T(C): 36.7 (16 Dec 2019 12:40), Max: 36.7 (16 Dec 2019 12:40)  T(F): 98 (16 Dec 2019 12:40), Max: 98 (16 Dec 2019 12:40)  HR: 68 (16 Dec 2019 12:40) (60 - 73)  BP: 110/71 (16 Dec 2019 12:40) (110/71 - 147/66)  BP(mean): --  RR: 19 (16 Dec 2019 12:40) (18 - 19)  SpO2: 98% (16 Dec 2019 12:40) (98% - 100%)  I&O's Detail      PHYSICAL EXAM  General:  thin, no pallor, NAD.  HEENT:    Normal appearance of conjunctiva, ears, nose, lips, oropharynx, and oral mucosa, anicteric. No thrush noted   Neck:  No masses, no asymmetry.  Lymph Nodes:  No lymphadenopathy.   Cardiovascular:  RRR normal S1/S2, no murmur.  Respiratory:  CTA B/L, normal respiratory effort.   Abdominal:   soft, no masses or tenderness, normoactive BS, NT/ND, no HSM.  Extremities:   No clubbing or cyanosis, normal capillary refill, no edema.   Skin:   No rash, jaundice, lesions, eczema.   Musculoskeletal:  No joint swelling, erythema or tenderness.   Neuro: No focal deficits; A+O x 2 (not to year or month)    Lab Results:                        11.3   6.95  )-----------( 212      ( 16 Dec 2019 09:31 )             35.0     12-16    136  |  99  |  18  ----------------------------<  184<H>  4.1   |  25  |  1.38<H>    Ca    8.4      16 Dec 2019 09:31    TPro  6.3  /  Alb  3.1<L>  /  TBili  0.4  /  DBili  x   /  AST  16  /  ALT  10  /  AlkPhos  100  12-16    LIVER FUNCTIONS - ( 16 Dec 2019 09:31 )  Alb: 3.1 g/dL / Pro: 6.3 g/dL / ALK PHOS: 100 U/L / ALT: 10 U/L / AST: 16 U/L / GGT: x           PT/INR - ( 16 Dec 2019 09:31 )   PT: 13.5 sec;   INR: 1.17 ratio         PTT - ( 16 Dec 2019 09:31 )  PTT:31.7 sec      Stool Results:          RADIOLOGY RESULTS:  < from: CT Head No Cont (12.16.19 @ 10:13) >    IMPRESSION: Age-appropriate involutional andischemic gliotic changes. No   hemorrhage. No change from 1/23/2019.     < end of copied text >    < from: Upper Endoscopy (10.18.19 @ 08:13) >  Impression:  - Non-bleeding esophageal ulcer. Biopsied.                       - White nummular lesions in esophageal mucosa. Cells for cytology obtained.                       - A single gastric polyp. Biopsied.                       - Nodular mucosa in the entire stomach. Biopsied.          - Duodenal erosions without bleeding.  Recommendation:      - Return patient to hospital [facility] for ongoing care.                       - Resume previous diet.                       - Use Protonix (pantoprazole) 40 mg PO daily.       - Telephone my office for pathology results in 1 week. [phone]    < end of copied text >    < from: Flexible Sigmoidoscopy (10.18.19 @ 08:13) >  Impression:  - Preparation of the colon was inadequate for screening.                       - Two largeulcers in the rectum, likely secondary to stercoral colitis.                        Biopsied.                       - A few medium-mouthed diverticula were found in the sigmoid colon.                       - Benign polypoid lesion ?lymphoid aggregatein the recto-sigmoid colon.                        Complete removal was accomplished.  Recommendation:   - Return patient to hospital [facility] for ongoing care.                       - High fiber diet.                       - Miralax 1 capful (17 grams) in 8 ounces of water PO BID.                       - Telephone my office for pathology results in 1 week. [phone]    < end of copied text >    Addendum  3. Esophagus, ulcer  - GMS stain shows fungal hyphae.    Verified by: Niki Paulson M.D.  (Electronic Signature)  Reported on: 10/23/19 16:18 EDT, [address] [facility],  [place] [address]    SURGICAL PATHOLOGY:   Final Diagnosis    1. Stomach, erosions, biopsy  - Active chronic gastritis with focal erosions and H. Pylori  organisms on Warthin-Starry stain    2. Stomach, random biopsy  - Active chronic gastritis with H. Pylori organisms on  Warthin-Starry stain  - Intestinal metaplasia  - Negative for dysplasia    3.  Esophagus, ulcer, biopsy  - Squamous epithelium with lymphocytic and focal, active  esophagitis  - Negative for viral cytopathic changes  - Fungus stain will be reported in an addendum    4. Sigmoid, polyp, biopsy  - Hyperplastic polyp    5. Rectum, ulcer, biopsy  - Squamocolumnar (anoderm with colonic/ rectal) mucosa with  focal active inflammation  - Negative for viral cytopathic changes    Cytopathology - Non Gyn Report:   ACCESSION No:  [id number]    ALYSON CRUZ                     1        Cytopathology Report          Cytopathology - Non Gyn Report:   ACCESSION No:  [id number]    ALYSON CRUZ                     1        Cytopathology Report            Specimen(s) Submitted  ESOPHAGUS, BRUSH      Clinical History  Nummular esophageal lesions. Rule out candidal esophagitis.      Gross Description  Received: 30 ml of clear fluid in CytoLyt with brushing  2 smears received (2 fixed)  Prepared: 1 Cell block, 1 GMS      Final Diagnosis  ESOPHAGUS, BRUSH  NEGATIVE FOR MALIGNANT CELLS.    Slides consist of benign and reactive squamous cells with acute  inflammation.    Cell block and GMS slides are noncontributory.    Please see concurrent biopsy specimen report [id number]    Screened by: Fouzia WALL (ASCP)  Verified by: LORETO SPENCE MD  (Electronic Signature)  Reported on: 10/21/19 14:48 EDT, [address] [facility],  [place] [address]  Cytology technical processing performed at [address] [place] [id number]  _________________________________________________________________ (10.18.19 @ 20:17)

## 2019-12-16 NOTE — ED PROVIDER NOTE - CLINICAL SUMMARY MEDICAL DECISION MAKING FREE TEXT BOX
87M hx dm, hld, ?SCC, BCC p/w fall. Trauma/syncope/infectious workup. CT head, cxr. Patient is nontoxic appearing and VSS, likely a/m for malignancy/syncope/FTT workup. Dr. Interiano (Attending Physician)  87M hx dm, hld, ?SCC, BCC p/w fall. Trauma/syncope/infectious workup. CT head, cxr. Patient is nontoxic appearing and VSS, likely a/m for malignancy/syncope/FTT workup.

## 2019-12-16 NOTE — CONSULT NOTE ADULT - ATTENDING COMMENTS
Patient seen and examined. Agree with above. Patient reports discomfort with eating up in the throat, not in the chest or esophagus. He reports no odynophagia, and denies choking or coughing. He reports improvement with thickened liquids during the last admission, and was advanced to a thin liquid diet recently at assisted living. His description of symptoms are more consistent with oropharyngeal dysphagia, though esophageal dysphagia cannot be ruled out. Would ascertain how long patient was treated with Diflucan (usually 2 weeks) and obtain repeat speech and swallow evaluation. If there are no findings on swallow evaluation, consider repeat EGD to confirm resolution of esophageal candidiasis. Ultimately, if symptoms still persists and workup is negative, can consider outpatient esophageal manometry.

## 2019-12-16 NOTE — H&P ADULT - NSHPPHYSICALEXAM_GEN_ALL_CORE
PHYSICAL EXAM:  Vital Signs Last 24 Hrs  T(C): 36.7 (12-16-19 @ 12:40), Max: 36.7 (12-16-19 @ 12:40)  T(F): 98 (12-16-19 @ 12:40), Max: 98 (12-16-19 @ 12:40)  HR: 68 (12-16-19 @ 12:40) (60 - 73)  BP: 110/71 (12-16-19 @ 12:40) (110/71 - 147/66)  BP(mean): --  RR: 19 (12-16-19 @ 12:40) (18 - 19)  SpO2: 98% (12-16-19 @ 12:40) (98% - 100%)  GENERAL: No apparent distress, appears stated age  HEAD:  left forehead abrasion/small hematoma, Normocephalic  EYES: Conjunctiva and sclera clear, no discharge  ENMT: Moist mucous membranes, no nasal discharge, dental decay  NECK: Supple, no JVD  CHEST/LUNG: Clear to auscultation; no rales, wheeze or rubs  HEART: Regular rate and rhythm; no murmurs, rubs or gallops appreciated  ABDOMEN: Soft, Nontender, Nondistended; Bowel sounds present  EXTREMITIES:  No clubbing, or edema, new right knee abrasion/old bruises  SKIN: No rash or new discoloration, sacral erythema with two small 1cm or less stage 2 ulcers POA  NERVOUS SYSTEM:  Alert & Oriented; Bilateral lower extremity mobile, sensation to light touch intact

## 2019-12-16 NOTE — ED PROVIDER NOTE - SKIN, MLM
Skin normal color for race, warm, dry and intact. No evidence of rash. abrasions to b/l knees. Contusion to L forhead with superficial abrasion. no crepitus

## 2019-12-16 NOTE — ED ADULT NURSE NOTE - OBJECTIVE STATEMENT
86 y/o male pmh anxiety/depression, diabetes mellitus, arrives to ED by EMS from assisted living s/p unwitnessed fall. Patient was found down, unknown downtime, unknown LOC, contusion and small laceration noted to left upper forehead. FS on scene 66, D10 IV given no scene by EMS, repeat FS 86 en route. Patient arrives to ED a/ox4, following commands, moving all extremities. C/o lower back pain. Right knee abrasion noted, stage II decubiti noted to sacral region, barrier creme applied. Patient states "I have lost 37 pounds" and reports "I have not had an appetite". Patient states he ambulates with walker. IV 20g placed by EMS clean/intact. Patient placed on CM, No chest pain, shortness of breath, dizziness/headache, abdomen pain, n/v/d. Seen and evaluated by ED MD.

## 2019-12-17 DIAGNOSIS — R62.7 ADULT FAILURE TO THRIVE: ICD-10-CM

## 2019-12-17 DIAGNOSIS — E04.1 NONTOXIC SINGLE THYROID NODULE: ICD-10-CM

## 2019-12-17 DIAGNOSIS — R13.10 DYSPHAGIA, UNSPECIFIED: ICD-10-CM

## 2019-12-17 LAB
ALBUMIN SERPL ELPH-MCNC: 2.7 G/DL — LOW (ref 3.3–5)
ALP SERPL-CCNC: 83 U/L — SIGNIFICANT CHANGE UP (ref 40–120)
ALT FLD-CCNC: 10 U/L — SIGNIFICANT CHANGE UP (ref 10–45)
ANION GAP SERPL CALC-SCNC: 11 MMOL/L — SIGNIFICANT CHANGE UP (ref 5–17)
AST SERPL-CCNC: 14 U/L — SIGNIFICANT CHANGE UP (ref 10–40)
BILIRUB SERPL-MCNC: 0.3 MG/DL — SIGNIFICANT CHANGE UP (ref 0.2–1.2)
BUN SERPL-MCNC: 14 MG/DL — SIGNIFICANT CHANGE UP (ref 7–23)
CALCIUM SERPL-MCNC: 8.5 MG/DL — SIGNIFICANT CHANGE UP (ref 8.4–10.5)
CHLORIDE SERPL-SCNC: 103 MMOL/L — SIGNIFICANT CHANGE UP (ref 96–108)
CO2 SERPL-SCNC: 24 MMOL/L — SIGNIFICANT CHANGE UP (ref 22–31)
CREAT SERPL-MCNC: 1.32 MG/DL — HIGH (ref 0.5–1.3)
GLUCOSE SERPL-MCNC: 89 MG/DL — SIGNIFICANT CHANGE UP (ref 70–99)
HBA1C BLD-MCNC: 5 % — SIGNIFICANT CHANGE UP (ref 4–5.6)
HCT VFR BLD CALC: 32.7 % — LOW (ref 39–50)
HGB BLD-MCNC: 10.1 G/DL — LOW (ref 13–17)
MCHC RBC-ENTMCNC: 30.9 GM/DL — LOW (ref 32–36)
MCHC RBC-ENTMCNC: 32.3 PG — SIGNIFICANT CHANGE UP (ref 27–34)
MCV RBC AUTO: 104.5 FL — HIGH (ref 80–100)
NRBC # BLD: 0 /100 WBCS — SIGNIFICANT CHANGE UP (ref 0–0)
PLATELET # BLD AUTO: 207 K/UL — SIGNIFICANT CHANGE UP (ref 150–400)
POTASSIUM SERPL-MCNC: 4.1 MMOL/L — SIGNIFICANT CHANGE UP (ref 3.5–5.3)
POTASSIUM SERPL-SCNC: 4.1 MMOL/L — SIGNIFICANT CHANGE UP (ref 3.5–5.3)
PROT SERPL-MCNC: 5.7 G/DL — LOW (ref 6–8.3)
RBC # BLD: 3.13 M/UL — LOW (ref 4.2–5.8)
RBC # FLD: 12.7 % — SIGNIFICANT CHANGE UP (ref 10.3–14.5)
SODIUM SERPL-SCNC: 138 MMOL/L — SIGNIFICANT CHANGE UP (ref 135–145)
TSH SERPL-MCNC: 2.08 UIU/ML — SIGNIFICANT CHANGE UP (ref 0.27–4.2)
WBC # BLD: 7.62 K/UL — SIGNIFICANT CHANGE UP (ref 3.8–10.5)
WBC # FLD AUTO: 7.62 K/UL — SIGNIFICANT CHANGE UP (ref 3.8–10.5)

## 2019-12-17 PROCEDURE — 93308 TTE F-UP OR LMTD: CPT | Mod: 26

## 2019-12-17 PROCEDURE — 93321 DOPPLER ECHO F-UP/LMTD STD: CPT | Mod: 26

## 2019-12-17 PROCEDURE — 99232 SBSQ HOSP IP/OBS MODERATE 35: CPT | Mod: GC

## 2019-12-17 PROCEDURE — 99233 SBSQ HOSP IP/OBS HIGH 50: CPT

## 2019-12-17 RX ORDER — DEXTROSE 50 % IN WATER 50 %
25 SYRINGE (ML) INTRAVENOUS ONCE
Refills: 0 | Status: COMPLETED | OUTPATIENT
Start: 2019-12-17 | End: 2019-12-17

## 2019-12-17 RX ORDER — SODIUM CHLORIDE 9 MG/ML
1000 INJECTION, SOLUTION INTRAVENOUS
Refills: 0 | Status: DISCONTINUED | OUTPATIENT
Start: 2019-12-17 | End: 2019-12-18

## 2019-12-17 RX ADMIN — Medication 1 TABLET(S): at 13:56

## 2019-12-17 RX ADMIN — SODIUM CHLORIDE 50 MILLILITER(S): 9 INJECTION, SOLUTION INTRAVENOUS at 10:06

## 2019-12-17 RX ADMIN — Medication 500000 UNIT(S): at 03:02

## 2019-12-17 RX ADMIN — Medication 1 APPLICATION(S): at 13:56

## 2019-12-17 RX ADMIN — HEPARIN SODIUM 5000 UNIT(S): 5000 INJECTION INTRAVENOUS; SUBCUTANEOUS at 18:38

## 2019-12-17 RX ADMIN — Medication 500000 UNIT(S): at 13:56

## 2019-12-17 RX ADMIN — PANTOPRAZOLE SODIUM 40 MILLIGRAM(S): 20 TABLET, DELAYED RELEASE ORAL at 05:34

## 2019-12-17 RX ADMIN — SODIUM CHLORIDE 50 MILLILITER(S): 9 INJECTION, SOLUTION INTRAVENOUS at 21:48

## 2019-12-17 RX ADMIN — SODIUM CHLORIDE 50 MILLILITER(S): 9 INJECTION, SOLUTION INTRAVENOUS at 13:57

## 2019-12-17 RX ADMIN — MIRTAZAPINE 15 MILLIGRAM(S): 45 TABLET, ORALLY DISINTEGRATING ORAL at 21:46

## 2019-12-17 RX ADMIN — Medication 0.5 MILLIGRAM(S): at 21:46

## 2019-12-17 RX ADMIN — Medication 500000 UNIT(S): at 18:38

## 2019-12-17 RX ADMIN — Medication 81 MILLIGRAM(S): at 13:56

## 2019-12-17 RX ADMIN — HEPARIN SODIUM 5000 UNIT(S): 5000 INJECTION INTRAVENOUS; SUBCUTANEOUS at 05:33

## 2019-12-17 RX ADMIN — Medication 500000 UNIT(S): at 21:46

## 2019-12-17 RX ADMIN — Medication 25 MILLILITER(S): at 09:20

## 2019-12-17 RX ADMIN — ESCITALOPRAM OXALATE 10 MILLIGRAM(S): 10 TABLET, FILM COATED ORAL at 13:56

## 2019-12-17 RX ADMIN — Medication 500000 UNIT(S): at 10:06

## 2019-12-17 NOTE — PROGRESS NOTE ADULT - PROBLEM SELECTOR PLAN 9
outpatient CT chest with thyroid nodule. TSH here normal, do not think cause of any symptoms, outpatient non-emergent thyroid US

## 2019-12-17 NOTE — PROGRESS NOTE ADULT - PROBLEM SELECTOR PLAN 8
forehead bruise without ICH on CT, not on A/C, no need to repeat unless neuro exam changes.  clean abrasion with bacitracin daily

## 2019-12-17 NOTE — PROGRESS NOTE ADULT - PROBLEM SELECTOR PLAN 1
unclear whether mechanical vs. syncope.  -no events on telemetry to date.  -family and patient states no cardiac disease, but Q waves in inferior leads on ECG  -check TTE, monitor on telemetry for 24 hours  -troponins initially elevated, but delta was 5, no chest pain. and no evidence of active ischemia on ECG, just old Q waves.   -orthostatics negative.  -monitor glucose.

## 2019-12-17 NOTE — DIETITIAN INITIAL EVALUATION ADULT. - PERTINENT LABORATORY DATA
Na 138, K+ 4.1, BUN 14, Cr 1.32, BG 89,AST 14, ALT 10  CAPILLARY BLOOD GLUCOSE  POCT Blood Glucose.: 104 mg/dL (17 Dec 2019 12:01)  POCT Blood Glucose.: 204 mg/dL (17 Dec 2019 09:38)  POCT Blood Glucose.: 63 mg/dL (17 Dec 2019 09:15)  POCT Blood Glucose.: 67 mg/dL (17 Dec 2019 08:27)  POCT Blood Glucose.: 78 mg/dL (17 Dec 2019 08:24)  Hemoglobin A1C, Whole Blood: 5.0 % (12-17 @ 09:10)  Hemoglobin A1C, Whole Blood: 5.2 % (10-17 @ 07:45)  Hemoglobin A1C, Whole Blood: 5.2 % (10-16 @ 08:13)

## 2019-12-17 NOTE — DIETITIAN INITIAL EVALUATION ADULT. - PERTINENT MEDS FT
aspirin enteric coated 81  BACItracin   Ointment 1  clonazePAM  Tablet 0.5  dextrose 40% Gel 15 PRN  dextrose 5%. 1000  dextrose 5%. 1000  dextrose 50% Injectable 12.5  dextrose 50% Injectable 25  dextrose 50% Injectable 25  escitalopram 10  glucagon  Injectable 1 PRN  heparin  Injectable 5000  mirtazapine 15  multivitamin 1  nystatin    Suspension [id number]  pantoprazole    Tablet 40

## 2019-12-17 NOTE — PHYSICAL THERAPY INITIAL EVALUATION ADULT - GENERAL OBSERVATIONS, REHAB EVAL
pt received in bed all siderails up PCA present in enhanced supervision room ; pt with call bell in reach , table ; noted pt forehead abrasions , knee R abrasion , L knee pink , small scabs L anterior le

## 2019-12-17 NOTE — DIETITIAN INITIAL EVALUATION ADULT. - ADD RECOMMEND
recommend Soft diet- pt Hgba1c 5.0-keep diet liberalized from consistent carbohydrate at this time. creatinine is elevated-if po intake improves consider need for 60 gram protein restriction. recommend Glucerna 1 x daily, ensure pudding 1 x daily, Vit C daily, continue multivitamin, and diet health shakes with meals. monitor for pt supplement preferences. placed malnutrition sticker and pending verification for diet order change. recommend Soft diet- pt HgbA1c 5.0-keep diet liberalized from consistent carbohydrate at this time. creatinine is elevated-if po intake improves consider need for 60 gram protein restriction. recommend Glucerna 1 x daily, ensure pudding 1 x daily, Vit C daily, continue multivitamin, and diet health shakes with meals. monitor for pt supplement preferences. placed malnutrition sticker and pending verification for diet order change.

## 2019-12-17 NOTE — PHYSICAL THERAPY INITIAL EVALUATION ADULT - ADDITIONAL COMMENTS
pt lives at [facility] pt lives at [facility] ; per pt amb w/o AD ; has aide assist to wash and dress 2-3 x/wk ; pt decline ID caregiver ; pt report was going to BR at [facility] and fell then had lifealert button and pressed and staff assisted him ; pre chart pt has lost 30 lbs in month

## 2019-12-17 NOTE — PROGRESS NOTE ADULT - SUBJECTIVE AND OBJECTIVE BOX
Chief Complaint:  Patient is a 87y old  Male who presents with a chief complaint of fall (16 Dec 2019 14:09)      Interval Events: tolerated food, 2 eggs this AM. No s/s eval yet. Denies n/v/abdominal pain. Feels better    Allergies:  penicillin (Rash)  quinidine (Unknown)      Hospital Medications:  aspirin enteric coated 81 milliGRAM(s) Oral daily  BACItracin   Ointment 1 Application(s) Topical daily  clonazePAM  Tablet 0.5 milliGRAM(s) Oral at bedtime  dextrose 40% Gel 15 Gram(s) Oral once PRN  dextrose 5%. 1000 milliLiter(s) IV Continuous <Continuous>  dextrose 5%. 1000 milliLiter(s) IV Continuous <Continuous>  dextrose 50% Injectable 12.5 Gram(s) IV Push once  dextrose 50% Injectable 25 Gram(s) IV Push once  dextrose 50% Injectable 25 Gram(s) IV Push once  escitalopram 10 milliGRAM(s) Oral daily  glucagon  Injectable 1 milliGRAM(s) IntraMuscular once PRN  heparin  Injectable 5000 Unit(s) SubCutaneous every 12 hours  mirtazapine 15 milliGRAM(s) Oral at bedtime  multivitamin 1 Tablet(s) Oral daily  nystatin    Suspension [id number] Unit(s) Oral four times a day  pantoprazole    Tablet 40 milliGRAM(s) Oral before breakfast      PMHX/PSHX:  Squamous cell carcinoma  Basal cell carcinoma of skin  Pancreatitis  Hypertension  Diabetes mellitus  Hyperlipidemia  Anxiety  Depression  H/O Moh's micrographic surgery for skin cancer  Renal cyst surgery in   Inguinal hernia s/p repair (L)      Family history:  FH: brain tumor  FH: diabetes mellitus      ROS:     General:  No wt loss, fevers, chills, night sweats, fatigue,   Eyes:  Good vision, no reported pain  ENT:  No sore throat, pain, runny nose, dysphagia  CV:  No pain, palpitations, hypo/hypertension  Resp:  No dyspnea, cough, tachypnea, wheezing  GI:  See HPI  :  No pain, bleeding, incontinence, nocturia  Muscle:  No pain, weakness  Neuro:  No weakness, tingling, memory problems  Psych:  No fatigue, insomnia, mood problems, depression  Endocrine:  No polyuria, polydipsia, cold/heat intolerance  Heme:  No petechiae, ecchymosis, easy bruisability  Skin:  No rash, edema      PHYSICAL EXAM:     Vital Signs:  Vital Signs Last 24 Hrs  T(C): 36.8 (17 Dec 2019 11:38), Max: 37.2 (16 Dec 2019 19:20)  T(F): 98.3 (17 Dec 2019 11:38), Max: 98.9 (16 Dec 2019 19:20)  HR: 62 (17 Dec 2019 11:38) (60 - 87)  BP: 99/59 (17 Dec 2019 11:38) (93/56 - 137/71)  BP(mean): --  RR: 18 (17 Dec 2019 11:38) (18 - 19)  SpO2: 97% (17 Dec 2019 11:38) (97% - 100%)  Daily     Daily Weight in k.7 (17 Dec 2019 04:57)    GENERAL:  appears comfortable, no acute distress  HEENT:  NC/AT,  conjunctivae clear, sclera -anicteric  CHEST:  CTABL, no increased effort  HEART:  Regular rhythm, S1, S2, no murmur/rub/S3/S4  ABDOMEN:  Soft, non-tender, non-distended, normoactive bowel sounds,  no masses ,no hepato-splenomegaly,   EXTREMITIES:  no cyanosis, clubbing or edema  SKIN:  No rash/erythema/ecchymoses/petechiae/wounds  NEURO:  Alert, oriented    LABS:                        10.1   7.62  )-----------( 207      ( 17 Dec 2019 06:39 )             32.7     12-17    138  |  103  |  14  ----------------------------<  89  4.1   |  24  |  1.32<H>    Ca    8.5      17 Dec 2019 06:39    TPro  5.7<L>  /  Alb  2.7<L>  /  TBili  0.3  /  DBili  x   /  AST  14  /  ALT  10  /  AlkPhos  83  12-17    LIVER FUNCTIONS - ( 17 Dec 2019 06:39 )  Alb: 2.7 g/dL / Pro: 5.7 g/dL / ALK PHOS: 83 U/L / ALT: 10 U/L / AST: 14 U/L / GGT: x           PT/INR - ( 16 Dec 2019 09:31 )   PT: 13.5 sec;   INR: 1.17 ratio         PTT - ( 16 Dec 2019 09:31 )  PTT:31.7 sec  Urinalysis Basic - ( 16 Dec 2019 12:41 )    Color: Colorless / Appearance: Clear / S.007 / pH: x  Gluc: x / Ketone: Negative  / Bili: Negative / Urobili: Negative   Blood: x / Protein: Negative / Nitrite: Negative   Leuk Esterase: Negative / RBC: 0 /hpf / WBC 0 /HPF   Sq Epi: x / Non Sq Epi: 0 /hpf / Bacteria: Negative          Imaging:  < from: CT Head No Cont (12.16.19 @ 10:13) >  IMPRESSION: Age-appropriate involutional andischemic gliotic changes. No   hemorrhage. No change from 2019.           < end of copied text >

## 2019-12-17 NOTE — PROGRESS NOTE ADULT - PROBLEM SELECTOR PLAN 4
appreciate GI, will discuss with 2nd opinion GI re: scope to ensure eradication of candida  -outpatient breath test confirmed treatment of h.pylori.

## 2019-12-17 NOTE — PHYSICAL THERAPY INITIAL EVALUATION ADULT - GAIT DEVIATIONS NOTED, PT EVAL
decreased jose d/decreased step length/decreased weight-shifting ability/decreased stride length/increased time in double stance

## 2019-12-17 NOTE — PROGRESS NOTE ADULT - SUBJECTIVE AND OBJECTIVE BOX
PROGRESS NOTE:   Authoted by Dr. Hilario Vargas DO  Pager [phone]     Patient is a 87y old  Male who presents with a chief complaint of fall (17 Dec 2019 12:14)      SUBJECTIVE / OVERNIGHT EVENTS: Patient admitted o/n with fall/syncope. Per patient was walking in apartment went to reach for something, felt nauseous and then fell to the ground and hit his head. Patient denies diaphoresis or lightheadeness, but per son had fecal incontinence just prior to incidence.  PAtient has had a 40 lb weight loss over the past 3 months, treated for H. pylori, candidal esophagitis., CT chest outpatient only pertinent for renal cysts, thyroid nodule. CT abd/pelvis last admission with proctitis, ?chronic pancreatitis. Endo/flex sig revealed stercorocolitis, fungal esophagitis, h.pylori. PAtient currently states still has some pain with swallowing, does not feeel food gets stuck in throat.     ADDITIONAL REVIEW OF SYSTEMS:    MEDICATIONS  (STANDING):  aspirin enteric coated 81 milliGRAM(s) Oral daily  BACItracin   Ointment 1 Application(s) Topical daily  clonazePAM  Tablet 0.5 milliGRAM(s) Oral at bedtime  dextrose 5%. 1000 milliLiter(s) (50 mL/Hr) IV Continuous <Continuous>  dextrose 5%. 1000 milliLiter(s) (50 mL/Hr) IV Continuous <Continuous>  dextrose 50% Injectable 12.5 Gram(s) IV Push once  dextrose 50% Injectable 25 Gram(s) IV Push once  dextrose 50% Injectable 25 Gram(s) IV Push once  escitalopram 10 milliGRAM(s) Oral daily  heparin  Injectable 5000 Unit(s) SubCutaneous every 12 hours  mirtazapine 15 milliGRAM(s) Oral at bedtime  multivitamin 1 Tablet(s) Oral daily  nystatin    Suspension 200083 Unit(s) Oral four times a day  pantoprazole    Tablet 40 milliGRAM(s) Oral before breakfast    MEDICATIONS  (PRN):  dextrose 40% Gel 15 Gram(s) Oral once PRN Blood Glucose LESS THAN 70 milliGRAM(s)/deciLiter  glucagon  Injectable 1 milliGRAM(s) IntraMuscular once PRN Glucose <70 milliGRAM(s)/deciLiter      CAPILLARY BLOOD GLUCOSE      POCT Blood Glucose.: 114 mg/dL (17 Dec 2019 17:46)  POCT Blood Glucose.: 104 mg/dL (17 Dec 2019 12:01)  POCT Blood Glucose.: 204 mg/dL (17 Dec 2019 09:38)  POCT Blood Glucose.: 63 mg/dL (17 Dec 2019 09:15)  POCT Blood Glucose.: 67 mg/dL (17 Dec 2019 08:27)  POCT Blood Glucose.: 78 mg/dL (17 Dec 2019 08:24)    I&O's Summary    16 Dec 2019 07:01  -  17 Dec 2019 07:00  --------------------------------------------------------  IN: 0 mL / OUT: 350 mL / NET: -350 mL    17 Dec 2019 07:01  -  17 Dec 2019 20:32  --------------------------------------------------------  IN: 240 mL / OUT: 300 mL / NET: -60 mL        PHYSICAL EXAM:  Vital Signs Last 24 Hrs  T(C): 36.8 (17 Dec 2019 11:38), Max: 36.9 (16 Dec 2019 21:04)  T(F): 98.3 (17 Dec 2019 11:38), Max: 98.4 (16 Dec 2019 21:04)  HR: 62 (17 Dec 2019 11:38) (60 - 87)  BP: 99/59 (17 Dec 2019 11:38) (93/56 - 137/71)  BP(mean): --  RR: 18 (17 Dec 2019 13:21) (18 - 19)  SpO2: 97% (17 Dec 2019 13:21) (97% - 98%)    CONSTITUTIONAL: frail, nad  HEENTL poor dentition. Yellow mucuous in pharynx. No candideal lesions seen in mouth.  RESPIRATORY: Normal respiratory effort; lungs are clear to auscultation bilaterally  CARDIOVASCULAR: Regular rate and rhythm, normal S1 and S2, no murmur/rub/gallop; No lower extremity edema; Peripheral pulses are 2+ bilaterally  ABDOMEN: Nontender to palpation, normoactive bowel sounds, no rebound/guarding; No hepatosplenomegaly  MUSCLOSKELETAL: no clubbing or cyanosis of digits; no joint swelling or tenderness to palpation  PSYCH: A+O to person, place, and time; affect appropriate    LABS:                        10.1   7.62  )-----------( 207      ( 17 Dec 2019 06:39 )             32.7         138  |  103  |  14  ----------------------------<  89  4.1   |  24  |  1.32<H>    Ca    8.5      17 Dec 2019 06:39    TPro  5.7<L>  /  Alb  2.7<L>  /  TBili  0.3  /  DBili  x   /  AST  14  /  ALT  10  /  AlkPhos  83      PT/INR - ( 16 Dec 2019 09:31 )   PT: 13.5 sec;   INR: 1.17 ratio         PTT - ( 16 Dec 2019 09:31 )  PTT:31.7 sec  CARDIAC MARKERS ( 16 Dec 2019 12:41 )  x     / x     / 129 U/L / x     / x          Urinalysis Basic - ( 16 Dec 2019 12:41 )    Color: Colorless / Appearance: Clear / S.007 / pH: x  Gluc: x / Ketone: Negative  / Bili: Negative / Urobili: Negative   Blood: x / Protein: Negative / Nitrite: Negative   Leuk Esterase: Negative / RBC: 0 /hpf / WBC 0 /HPF   Sq Epi: x / Non Sq Epi: 0 /hpf / Bacteria: Negative          RADIOLOGY & ADDITIONAL TESTS:  Results Reviewed:   Imaging Personally Reviewed:  < from: CT Head No Cont (12.16.19 @ 10:13) >  IMPRESSION: Age-appropriate involutional andischemic gliotic changes. No   hemorrhage. No change from 2019.     < end of copied text >    Electrocardiogram Personally Reviewed: Q waves in inferior leads, stable from october admission    COORDINATION OF CARE:  Care Discussed with Consultants/Other Providers [Y/N]: Outpatient GI, Dr. Guerra  Prior or Outpatient Records Reviewed [Y/N]: [facility] records

## 2019-12-17 NOTE — PROGRESS NOTE ADULT - PROBLEM SELECTOR PLAN 2
patient with 40 lb weight loss of unknown etiology, low albumin.  -it is also not clear in an immunocompetent male why he would have candida esophagitis.  -CT c/a/p without cause of malignancy. Though not flex sig, no signs of colon malignancy.  -will check HIV/hepatitis C given candidal esophagitis.  -check cortisol given hypotensioni (relative) and hypoglycemia  -nutrition consult.  -GI re: possible re-scope to assess for eradication of candideal esophagitis.  -continue remeron. Can restart marinol after GI w/u.

## 2019-12-17 NOTE — PHYSICAL THERAPY INITIAL EVALUATION ADULT - IMPAIRED TRANSFERS: SIT/STAND, REHAB EVAL
impaired balance/decreased endurance , pt intermittent cross over feet vc to widen base of support , initially retropulsed improved with vc and standing mod of 1/narrow base of support/impaired postural control/decreased strength

## 2019-12-17 NOTE — DIETITIAN INITIAL EVALUATION ADULT. - FACTORS AFF FOOD INTAKE
dislikes honey consistency liquids and believes that he doesn't need thickened liquids any longer/other (specify)

## 2019-12-17 NOTE — PROGRESS NOTE ADULT - PROBLEM SELECTOR PLAN 3
holding metformin.  -check AM cortisol as metformin typically should not cause hypoglycemia (much less incidence than sulfonylureas)

## 2019-12-17 NOTE — PROGRESS NOTE ADULT - ASSESSMENT
88 yo m with h/o HTN, HLD, DM2, pancreatitis, basal/squamous cell carcinoma s/p Mohs surg, depression and anxiety who presented from the [facility] after a fall s/p Ct head w/ no evidence of bleed who was recently admitted in October for abdominal pain and dysphagia s/p EGD and flex sig that was positive for esophageal candidiasis and H. pylori gastritis now s/p treatment s/p MBS and speech therapy presenting with oropharyngeal dysphagia      Impression:  # Oropharyngeal Dysphagia, vs esophageal dysphagia: DDX: Neurological vs Infectious vs structural  # Esopahgeal Candidiasis: s/p treatment    Reccs:  - await speech/swallow eval  - continue with current diet  - if oropharyngeal dysphagia pt can follow up outpatient for further evaluation  - if eval normal can consider repeat EGD to evaluate for persistent esophageal candidiasis   - may need outpatient motility testing if above negative

## 2019-12-17 NOTE — PHYSICAL THERAPY INITIAL EVALUATION ADULT - DISCHARGE DISPOSITION, PT EVAL
rehabilitation facility/Subacute rehab to increase fxl mobiltiy , strength, balance, endurance, fall prevention education continued ;if pt goes home need assist all fxl mobility and adl's and Home PT

## 2019-12-17 NOTE — PHYSICAL THERAPY INITIAL EVALUATION ADULT - IMPAIRMENTS CONTRIBUTING TO GAIT DEVIATIONS, PT EVAL
impaired balance/cognition/decreased endurance , mod unsteady , vc to widen base of support, intermittent scissoring gait/narrow base of support/impaired postural control/decreased strength

## 2019-12-17 NOTE — CHART NOTE - NSCHARTNOTEFT_GEN_A_CORE
Upon Nutritional Assessment by the Registered Dietitian your patient was determined to meet criteria / has evidence of the following diagnosis/diagnoses:          [ ]  Mild Protein Calorie Malnutrition        [ ]  Moderate Protein Calorie Malnutrition        [ x] Severe Protein Calorie Malnutrition        [ ] Unspecified Protein Calorie Malnutrition        [ ] Underweight / BMI <19        [ ] Morbid Obesity / BMI > 40      Findings as based on:  [x ] Comprehensive nutrition assessment: 28.36 % loss in 3 1/2 months, <75% intake >1 month  [x ] Nutrition Focused Physical Exam:  Patient consented to nutrition focused physical exam. Findings as follows: severe muscle wasting from temples and clavicles, severe squaring of the shoulders, severe muscle wasting from the interosseous muscles, severe muscle wasting from calf noted. severe fat loss from orbitals and ribs noted.  [ ] Other:       Nutrition Plan/Recommendations:    recommend Soft diet- pt HgbA1c 5.0-keep diet liberalized from consistent carbohydrate at this time. creatinine is elevated-if po intake improves consider need for 60 gram protein restriction. recommend Glucerna 1 x daily, ensure pudding 1 x daily, Vit C daily, continue multivitamin, and diet health shakes with meals. monitor for pt supplement preferences.       PROVIDER Section:     By signing this assessment you are acknowledging and agree with the diagnosis/diagnoses assigned by the Registered Dietitian    Comments:

## 2019-12-17 NOTE — PHYSICAL THERAPY INITIAL EVALUATION ADULT - IMPAIRMENTS CONTRIBUTING IMPAIRED BED MOBILITY, REHAB EVAL
decreased strength/decreased endurance , sat x 5 min vitals taken see vitals no cc's cg of 1 work on weight shift and balance/impaired postural control/impaired balance

## 2019-12-17 NOTE — PHYSICAL THERAPY INITIAL EVALUATION ADULT - PRECAUTIONS/LIMITATIONS, REHAB EVAL
aspiration precautions/fall precautions/87M BIBEMS from [facility] s/p unwitnessed fall. Patient found down per EMS with contusion on his forehead. No syncope. Patient is poor historian although states he remembers the entire event but is unclear why he fell. States that he just "went over." Perhaps mild light headedness preceding. h/o frequent falls. denies cp, sob, abd pain, weakness, sensory changes, no other complaints. EMS noted FS in the field was 60 and he got dextrose. Per son, patient has lost approximately 30 pounds in the past month and has not been eating well. No other febrile illnesses. son reports mild runny nose but no cough or fever. patient requesting to hold laxatives due to loose stool.; recent EGD /Flex sig : candidal esophagitis/gastritis/ PUD/ stercoral colitis ; HCT 12/12/16/19 no acute event , age appropriate involutional and ischemic gliotic changes ; 12/16/19 CXR (-) ; EKG 12/16/19 NSR/ RBBB/LAD

## 2019-12-17 NOTE — DIETITIAN INITIAL EVALUATION ADULT. - OTHER INFO
Reason for admission :  fall  Diet PTA : oj, frosted flakes, berries, prunes and eggs for breakfast, all he could remember was ginger ale for lunch and potato pancakes for dinner. he would snack on turkey sandwiches and once in a while drink ensure. he lives at the [facility] where he receives a Mechanical Soft diet with thin liquids, boost pudding 1 x daily, Glucerna 1 x daily.   Intake : pt can not recall his intake and PCA was not able to report  Denies nausea/vomit :  Denies difficulty chewing /swallow :  Last BM :  NKFA  IBW +/- 10%=  Ht:  Usual Weight PTA:  BMI:  Education Provided :  skin:  edema: Reason for admission :  fall  Diet PTA : oj, frosted flakes, berries, prunes and eggs for breakfast, all he could remember was ginger ale for lunch and potato pancakes for dinner. he would snack on turkey sandwiches and once in a while drink ensure. he lives at the [facility] where he receives a Mechanical Soft diet with thin liquids, boost pudding 1 x daily, Glucerna 1 x daily.   Intake : pt cannot recall his intake and PCA was not able to report  Denies nausea/vomit :  Denies difficulty chewing /swallow:  Last BM :12/16  NKFA  IBW +/- 10%=166  Ht:70"  Usual Weight PTA:141pounds  BMI: 16.7  Education Provided : N/A  skin: stage 1 sacrum, stage 2 -location ?  edema: none

## 2019-12-17 NOTE — DIETITIAN INITIAL EVALUATION ADULT. - PHYSICAL APPEARANCE
emaciated/Patient consented to nutrition focused physical exam. Findings as follows: severe muscle wasting from temples and clavicles, severe squaring of the shoulders, severe muscle wasting from the interosseous muscles, severe muscle wasting from calf noted. severe fat loss from orbitals and ribs noted.

## 2019-12-17 NOTE — CHART NOTE - NSCHARTNOTEFT_GEN_A_CORE
Patient this AM was tolerating food, at this point repeat endoscopy not yet indicated. Continue to monitor hospital course and assess based on symptoms.

## 2019-12-18 LAB
ANION GAP SERPL CALC-SCNC: 8 MMOL/L — SIGNIFICANT CHANGE UP (ref 5–17)
BUN SERPL-MCNC: 14 MG/DL — SIGNIFICANT CHANGE UP (ref 7–23)
CALCIUM SERPL-MCNC: 8.5 MG/DL — SIGNIFICANT CHANGE UP (ref 8.4–10.5)
CHLORIDE SERPL-SCNC: 102 MMOL/L — SIGNIFICANT CHANGE UP (ref 96–108)
CO2 SERPL-SCNC: 24 MMOL/L — SIGNIFICANT CHANGE UP (ref 22–31)
CORTIS AM PEAK SERPL-MCNC: 17.4 UG/DL — SIGNIFICANT CHANGE UP (ref 6–18.4)
CREAT SERPL-MCNC: 1.27 MG/DL — SIGNIFICANT CHANGE UP (ref 0.5–1.3)
GLUCOSE SERPL-MCNC: 93 MG/DL — SIGNIFICANT CHANGE UP (ref 70–99)
HCT VFR BLD CALC: 37.8 % — LOW (ref 39–50)
HCV AB S/CO SERPL IA: 0.11 S/CO — SIGNIFICANT CHANGE UP (ref 0–0.99)
HCV AB SERPL-IMP: SIGNIFICANT CHANGE UP
HGB BLD-MCNC: 11.8 G/DL — LOW (ref 13–17)
HIV 1+2 AB+HIV1 P24 AG SERPL QL IA: SIGNIFICANT CHANGE UP
MCHC RBC-ENTMCNC: 31.2 GM/DL — LOW (ref 32–36)
MCHC RBC-ENTMCNC: 32.4 PG — SIGNIFICANT CHANGE UP (ref 27–34)
MCV RBC AUTO: 103.8 FL — HIGH (ref 80–100)
NRBC # BLD: 0 /100 WBCS — SIGNIFICANT CHANGE UP (ref 0–0)
PLATELET # BLD AUTO: 212 K/UL — SIGNIFICANT CHANGE UP (ref 150–400)
POTASSIUM SERPL-MCNC: 3.9 MMOL/L — SIGNIFICANT CHANGE UP (ref 3.5–5.3)
POTASSIUM SERPL-SCNC: 3.9 MMOL/L — SIGNIFICANT CHANGE UP (ref 3.5–5.3)
RBC # BLD: 3.64 M/UL — LOW (ref 4.2–5.8)
RBC # FLD: 12.6 % — SIGNIFICANT CHANGE UP (ref 10.3–14.5)
SODIUM SERPL-SCNC: 134 MMOL/L — LOW (ref 135–145)
WBC # BLD: 7.54 K/UL — SIGNIFICANT CHANGE UP (ref 3.8–10.5)
WBC # FLD AUTO: 7.54 K/UL — SIGNIFICANT CHANGE UP (ref 3.8–10.5)

## 2019-12-18 PROCEDURE — 99232 SBSQ HOSP IP/OBS MODERATE 35: CPT | Mod: GC

## 2019-12-18 PROCEDURE — 99232 SBSQ HOSP IP/OBS MODERATE 35: CPT

## 2019-12-18 RX ADMIN — HEPARIN SODIUM 5000 UNIT(S): 5000 INJECTION INTRAVENOUS; SUBCUTANEOUS at 18:37

## 2019-12-18 RX ADMIN — MIRTAZAPINE 15 MILLIGRAM(S): 45 TABLET, ORALLY DISINTEGRATING ORAL at 21:54

## 2019-12-18 RX ADMIN — Medication 81 MILLIGRAM(S): at 13:42

## 2019-12-18 RX ADMIN — ESCITALOPRAM OXALATE 10 MILLIGRAM(S): 10 TABLET, FILM COATED ORAL at 13:42

## 2019-12-18 RX ADMIN — Medication 1 TABLET(S): at 13:43

## 2019-12-18 RX ADMIN — Medication 1 APPLICATION(S): at 13:43

## 2019-12-18 RX ADMIN — HEPARIN SODIUM 5000 UNIT(S): 5000 INJECTION INTRAVENOUS; SUBCUTANEOUS at 06:56

## 2019-12-18 RX ADMIN — Medication 500000 UNIT(S): at 06:58

## 2019-12-18 RX ADMIN — Medication 0.5 MILLIGRAM(S): at 21:54

## 2019-12-18 RX ADMIN — PANTOPRAZOLE SODIUM 40 MILLIGRAM(S): 20 TABLET, DELAYED RELEASE ORAL at 06:58

## 2019-12-18 NOTE — PROGRESS NOTE ADULT - ASSESSMENT
Impression:  # Oropharyngeal Dysphagia, vs esophageal dysphagia: DDX: Neurological vs Infectious vs structural  # Esopahgeal Candidiasis: s/p treatment    Reccs: Impression:  # Oropharyngeal Dysphagia, vs esophageal dysphagia: DDX: Neurological vs Infectious vs structural  # Esopahgeal Candidiasis: s/p treatment    Recommenadtions:  - await speech/swallow eval  - continue with current diet  - if oropharyngeal dysphagia pt can follow up outpatient for further evaluation  - if eval normal can consider repeat EGD to evaluate for persistent esophageal candidiasis   - may need outpatient motility testing if above negative    Naima Buenrostro MD  Gastroenterology Fellow  [phone]  Please page on call fellow on weekends and after 5pm on weekdays

## 2019-12-18 NOTE — PROGRESS NOTE ADULT - ATTENDING COMMENTS
Agree with above. Patient without dysphagia now, thus it is unlikely that there is still candida esophagitis which was already treated with Diflucan as outpatient. Would repeat speech and swallow as his symptoms that he reported initially on admission was more consistent with oropharyngeal dysphagia, so that we can give him the appropriate dysphagia diet as needed.

## 2019-12-18 NOTE — PROGRESS NOTE ADULT - ASSESSMENT
87 male HTN, HLD with recent admission found to have candidal esophagitis, H.pylori presents after fall/syncope with failure to thrive and continued odynophagia

## 2019-12-18 NOTE — PROGRESS NOTE ADULT - PROBLEM SELECTOR PLAN 1
unclear whether mechanical vs. syncope.  -no events on telemetry to date.  -family and patient states no cardiac disease, but Q waves in inferior leads on ECG  -TTE uninterpretable, will attempt to discuss with echo lab whether poor windows or another reason not able to obtain any information from TTEmonitor on telemetry for 24 hours  -troponins initially elevated, but delta was 5, no chest pain. and no evidence of active ischemia on ECG, just old Q waves.   -orthostatics negative.  -monitor glucose.

## 2019-12-18 NOTE — PROGRESS NOTE ADULT - PROBLEM SELECTOR PLAN 4
appreciate GI, will discuss with 2nd opinion GI re: repeats scope  -outpatient breath test confirmed treatment of h.pylori.

## 2019-12-18 NOTE — PROGRESS NOTE ADULT - SUBJECTIVE AND OBJECTIVE BOX
PROGRESS NOTE:   Authoted by Dr. Hilario Vargas DO  Pager [phone]     Patient is a 87y old  Male who presents with a chief complaint of fall (17 Dec 2019 12:14)      SUBJECTIVE / OVERNIGHT EVENTS: No events. Patient ate breakfast without incident. Walked with PT. no fever, chills, chest pain, SOB, abdominal pain, n/v/d/c.     ADDITIONAL REVIEW OF SYSTEMS:    MEDICATIONS  (STANDING):  aspirin enteric coated 81 milliGRAM(s) Oral daily  BACItracin   Ointment 1 Application(s) Topical daily  clonazePAM  Tablet 0.5 milliGRAM(s) Oral at bedtime  dextrose 5%. 1000 milliLiter(s) (50 mL/Hr) IV Continuous <Continuous>  dextrose 5%. 1000 milliLiter(s) (50 mL/Hr) IV Continuous <Continuous>  dextrose 50% Injectable 12.5 Gram(s) IV Push once  dextrose 50% Injectable 25 Gram(s) IV Push once  dextrose 50% Injectable 25 Gram(s) IV Push once  escitalopram 10 milliGRAM(s) Oral daily  heparin  Injectable 5000 Unit(s) SubCutaneous every 12 hours  mirtazapine 15 milliGRAM(s) Oral at bedtime  multivitamin 1 Tablet(s) Oral daily  nystatin    Suspension 291632 Unit(s) Oral four times a day  pantoprazole    Tablet 40 milliGRAM(s) Oral before breakfast    MEDICATIONS  (PRN):  dextrose 40% Gel 15 Gram(s) Oral once PRN Blood Glucose LESS THAN 70 milliGRAM(s)/deciLiter  glucagon  Injectable 1 milliGRAM(s) IntraMuscular once PRN Glucose <70 milliGRAM(s)/deciLiter      CAPILLARY BLOOD GLUCOSE      POCT Blood Glucose.: 109 mg/dL (18 Dec 2019 13:30)      I&O's Summary    16 Dec 2019 07:  -  17 Dec 2019 07:00  --------------------------------------------------------  IN: 0 mL / OUT: 350 mL / NET: -350 mL    17 Dec 2019 07:  -  17 Dec 2019 20:32  --------------------------------------------------------  IN: 240 mL / OUT: 300 mL / NET: -60 mL        PHYSICAL EXAM:  Vital Signs Last 24 Hrs  T(C): 36.7 (18 Dec 2019 14:54), Max: 36.8 (17 Dec 2019 20:41)  T(F): 98.1 (18 Dec 2019 14:54), Max: 98.3 (17 Dec 2019 20:41)  HR: 80 (18 Dec 2019 14:54) (62 - 80)  BP: 113/71 (18 Dec 2019 14:54) (113/71 - 119/62)  BP(mean): --  RR: 18 (18 Dec 2019 14:54) (18 - 18)  SpO2: 95% (18 Dec 2019 14:54) (95% - 99%))    CONSTITUTIONAL: frail, nad  HEENTL poor dentition. Yellow mucuous in pharynx. No candideal lesions seen in mouth.  RESPIRATORY: Normal respiratory effort; lungs are clear to auscultation bilaterally  CARDIOVASCULAR: Regular rate and rhythm, normal S1 and S2, no murmur/rub/gallop; No lower extremity edema; Peripheral pulses are 2+ bilaterally  ABDOMEN: Nontender to palpation, normoactive bowel sounds, no rebound/guarding; No hepatosplenomegaly  MUSCLOSKELETAL: no clubbing or cyanosis of digits; no joint swelling or tenderness to palpation  PSYCH: A+O to person, place, and time; affect appropriate    LABS:                                      11.8   7.54  )-----------( 212      ( 18 Dec 2019 09:16 )             37.8   12-18    134<L>  |  102  |  14  ----------------------------<  93  3.9   |  24  |  1.27    Ca    8.5      18 Dec 2019 09:16    TPro  5.7<L>  /  Alb  2.7<L>  /  TBili  0.3  /  DBili  x   /  AST  14  /  ALT  10  /  AlkPhos  83  12-17     PTT - ( 16 Dec 2019 09:31 )  PTT:31.7 sec  CARDIAC MARKERS ( 16 Dec 2019 12:41 )  x     / x     / 129 U/L / x     / x          Urinalysis Basic - ( 16 Dec 2019 12:41 )    Color: Colorless / Appearance: Clear / S.007 / pH: x  Gluc: x / Ketone: Negative  / Bili: Negative / Urobili: Negative   Blood: x / Protein: Negative / Nitrite: Negative   Leuk Esterase: Negative / RBC: 0 /hpf / WBC 0 /HPF   Sq Epi: x / Non Sq Epi: 0 /hpf / Bacteria: Negative      HIV-1/2 Antigen/Antibody Screen by [facility] (19 @ 12:40)    HIV-1/2 Combo Result: Nonreact: The HIV Ag/Ab Combo test performed screens for HIV-1 p24 antigen,  antibodies to HIV-1 (group M and group O), and antibodies to HIV-2. All  specimens repeatedly reactive will reflex to an HIV 1/2 antibody  confirmation and differentiation test. This assay detects p24 antigen  which may be present prior to the development of HIV antibodies,  therefore a reactive result with a negative HIV 1/2 AB Confirmation  should be followed up with HIV-1 RNA, HIV-2 RNA and repeat testing in 4-8  weeks. A nonreactive result does not preclude previous exposure to or  infection with HIV-1 or HIV-2. [facility] prohibits disclosure of this  result to any unauthorized party.    Hepatitis C Antibody Test (19 @ 12:29)    Hepatitis C Virus S/CO Ratio: 0.11 S/CO    Hepatitis C Virus Interpretation: Nonreact: Hepatitis C AB  S/CO Ratio                        Interpretation  < 1.00                                   Non-Reactive  1.00 - 4.99                         Weakly-Reactive  >= 5.00                                Reactive  Non-Reactive: A person witha non-reactive HCV antibody result is  considered uninfected.  No further action is needed unless recent  infection is suspected.  In these cases, consider repeat testing later to  detect seroconversion..  Weakly-Reactive: HCV antibody test is abnormal, HCV RNA Qualitative test  will follow.  Reactive: HCV antibody test is abnormal, HCV RNA Qualitative test will  follow.  Note: HCV antibody testing is performed on the Abbott  system.        RADIOLOGY & ADDITIONAL TESTS:  Results Reviewed:   Imaging Personally Reviewed:  < from: CT Head No Cont (19 @ 10:13) >  IMPRESSION: Age-appropriate involutional andischemic gliotic changes. No   hemorrhage. No change from 2019.     < end of copied text >    Electrocardiogram Personally Reviewed: Q waves in inferior leads, stable from october admission    COORDINATION OF CARE:  Care Discussed with Consultants/Other Providers [Y/N]: Outpatient GI, Dr. Guerra  Prior or Outpatient Records Reviewed [Y/N]: [facility] records

## 2019-12-18 NOTE — PROGRESS NOTE ADULT - PROBLEM SELECTOR PLAN 3
holding metformin.  -AM cortisol normal.  -will take off D5 drip as slightly hyponatremic and monitor glucose.   -PiT3V=8, no need for metformin when leaves here.

## 2019-12-18 NOTE — PROGRESS NOTE ADULT - PROBLEM SELECTOR PLAN 2
patient with 40 lb weight loss of unknown etiology, low albumin.  -it is also not clear in an immunocompetent male why he would have candida esophagitis.  -CT c/a/p without cause of malignancy. Though not flex sig, no signs of colon malignancy.  -HIV and hepatitis C is negative.  -cortisol normal.  -still unclear why failure to thrive and why immunocompetent patient has candidal esophagus, continue workuop.   -nutrition consult.  -GI re: possible re-scope to assess for eradication of candideal esophagitis.  -continue remeron. Can restart marinol after GI w/u.  -discussed at length with patient's family, they do not want patient to undergo repeat S/S as had 4 in past few months.

## 2019-12-19 ENCOUNTER — TRANSCRIPTION ENCOUNTER (OUTPATIENT)
Age: 84
End: 2019-12-19

## 2019-12-19 VITALS
TEMPERATURE: 98 F | SYSTOLIC BLOOD PRESSURE: 106 MMHG | HEART RATE: 81 BPM | RESPIRATION RATE: 18 BRPM | OXYGEN SATURATION: 98 % | DIASTOLIC BLOOD PRESSURE: 62 MMHG

## 2019-12-19 PROCEDURE — 90715 TDAP VACCINE 7 YRS/> IM: CPT

## 2019-12-19 PROCEDURE — 82550 ASSAY OF CK (CPK): CPT

## 2019-12-19 PROCEDURE — 80307 DRUG TEST PRSMV CHEM ANLYZR: CPT

## 2019-12-19 PROCEDURE — 80053 COMPREHEN METABOLIC PANEL: CPT

## 2019-12-19 PROCEDURE — 70450 CT HEAD/BRAIN W/O DYE: CPT

## 2019-12-19 PROCEDURE — 83690 ASSAY OF LIPASE: CPT

## 2019-12-19 PROCEDURE — 97162 PT EVAL MOD COMPLEX 30 MIN: CPT

## 2019-12-19 PROCEDURE — 84480 ASSAY TRIIODOTHYRONINE (T3): CPT

## 2019-12-19 PROCEDURE — 87486 CHLMYD PNEUM DNA AMP PROBE: CPT

## 2019-12-19 PROCEDURE — 83036 HEMOGLOBIN GLYCOSYLATED A1C: CPT

## 2019-12-19 PROCEDURE — 82435 ASSAY OF BLOOD CHLORIDE: CPT

## 2019-12-19 PROCEDURE — 82947 ASSAY GLUCOSE BLOOD QUANT: CPT

## 2019-12-19 PROCEDURE — 99232 SBSQ HOSP IP/OBS MODERATE 35: CPT

## 2019-12-19 PROCEDURE — 93005 ELECTROCARDIOGRAM TRACING: CPT

## 2019-12-19 PROCEDURE — 82803 BLOOD GASES ANY COMBINATION: CPT

## 2019-12-19 PROCEDURE — 87581 M.PNEUMON DNA AMP PROBE: CPT

## 2019-12-19 PROCEDURE — 86803 HEPATITIS C AB TEST: CPT

## 2019-12-19 PROCEDURE — 84484 ASSAY OF TROPONIN QUANT: CPT

## 2019-12-19 PROCEDURE — 71045 X-RAY EXAM CHEST 1 VIEW: CPT

## 2019-12-19 PROCEDURE — 82330 ASSAY OF CALCIUM: CPT

## 2019-12-19 PROCEDURE — 99285 EMERGENCY DEPT VISIT HI MDM: CPT | Mod: 25

## 2019-12-19 PROCEDURE — 87389 HIV-1 AG W/HIV-1&-2 AB AG IA: CPT

## 2019-12-19 PROCEDURE — 82962 GLUCOSE BLOOD TEST: CPT

## 2019-12-19 PROCEDURE — 87798 DETECT AGENT NOS DNA AMP: CPT

## 2019-12-19 PROCEDURE — 82533 TOTAL CORTISOL: CPT

## 2019-12-19 PROCEDURE — 83605 ASSAY OF LACTIC ACID: CPT

## 2019-12-19 PROCEDURE — 84132 ASSAY OF SERUM POTASSIUM: CPT

## 2019-12-19 PROCEDURE — 84295 ASSAY OF SERUM SODIUM: CPT

## 2019-12-19 PROCEDURE — 80048 BASIC METABOLIC PNL TOTAL CA: CPT

## 2019-12-19 PROCEDURE — 85730 THROMBOPLASTIN TIME PARTIAL: CPT

## 2019-12-19 PROCEDURE — 93321 DOPPLER ECHO F-UP/LMTD STD: CPT

## 2019-12-19 PROCEDURE — 85610 PROTHROMBIN TIME: CPT

## 2019-12-19 PROCEDURE — 93308 TTE F-UP OR LMTD: CPT

## 2019-12-19 PROCEDURE — 90471 IMMUNIZATION ADMIN: CPT

## 2019-12-19 PROCEDURE — 87633 RESP VIRUS 12-25 TARGETS: CPT

## 2019-12-19 PROCEDURE — 84443 ASSAY THYROID STIM HORMONE: CPT

## 2019-12-19 PROCEDURE — 85014 HEMATOCRIT: CPT

## 2019-12-19 PROCEDURE — 84436 ASSAY OF TOTAL THYROXINE: CPT

## 2019-12-19 PROCEDURE — 81001 URINALYSIS AUTO W/SCOPE: CPT

## 2019-12-19 PROCEDURE — 85027 COMPLETE CBC AUTOMATED: CPT

## 2019-12-19 PROCEDURE — 99239 HOSP IP/OBS DSCHRG MGMT >30: CPT

## 2019-12-19 RX ORDER — DRONABINOL 2.5 MG
1 CAPSULE ORAL
Qty: 0 | Refills: 0 | DISCHARGE
Start: 2019-12-19

## 2019-12-19 RX ORDER — BACITRACIN ZINC 500 UNIT/G
1 OINTMENT IN PACKET (EA) TOPICAL
Qty: 0 | Refills: 0 | DISCHARGE
Start: 2019-12-19

## 2019-12-19 RX ORDER — METFORMIN HYDROCHLORIDE 850 MG/1
1 TABLET ORAL
Qty: 0 | Refills: 0 | DISCHARGE

## 2019-12-19 RX ORDER — DRONABINOL 2.5 MG
2.5 CAPSULE ORAL
Refills: 0 | Status: DISCONTINUED | OUTPATIENT
Start: 2019-12-19 | End: 2019-12-19

## 2019-12-19 RX ADMIN — Medication 81 MILLIGRAM(S): at 13:16

## 2019-12-19 RX ADMIN — ESCITALOPRAM OXALATE 10 MILLIGRAM(S): 10 TABLET, FILM COATED ORAL at 13:17

## 2019-12-19 RX ADMIN — HEPARIN SODIUM 5000 UNIT(S): 5000 INJECTION INTRAVENOUS; SUBCUTANEOUS at 05:25

## 2019-12-19 RX ADMIN — Medication 1 TABLET(S): at 13:18

## 2019-12-19 RX ADMIN — Medication 1 APPLICATION(S): at 13:19

## 2019-12-19 RX ADMIN — PANTOPRAZOLE SODIUM 40 MILLIGRAM(S): 20 TABLET, DELAYED RELEASE ORAL at 05:25

## 2019-12-19 NOTE — PROGRESS NOTE ADULT - ASSESSMENT
Impression:  # Oropharyngeal Dysphagia, vs esophageal dysphagia: DDX: Neurological vs Infectious vs structural  - now resolved  # Esopahgeal Candidiasis: s/p treatment w/ resolution of sx    Recommenadtions:  - pt refused speech/swallow eval  - continue with current diet  - if oropharyngeal dysphagia pt can follow up outpatient for further evaluation  - after further discussion with primary team, will not pursue EGD inpatient  - f/u outpatient GI for consideration of EGD and motility testing if sx do not improve  - please call back w/ any questions    Jimmei Gaines  Gastroenterology Fellow  Pager# [phone] ([facility]) or [phone] ([facility])  GI Phone# [phone] ([facility])  Page on-call GI fellow via  from 5pm-8am and on weekends Impression:  # Oropharyngeal Dysphagia, vs esophageal dysphagia: DDX: Neurological vs Infectious vs structural  - now resolved  # Esopahgeal Candidiasis: s/p treatment w/ resolution of sx    Recommenadtions:  - pt refused speech/swallow eval  - continue with current diet  - Patient currently has no dysphagia; no real indication to repeat EGD as candida esophagitis likely all resolved by now after course of Diflucan  - if oropharyngeal dysphagia pt can follow up outpatient for further evaluation  - after further discussion with primary team, will not pursue EGD inpatient  - f/u outpatient GI for consideration of EGD and motility testing if sx do not improve  - please call back w/ any questions    Jimmie Gaines  Gastroenterology Fellow  Pager# [phone] ([facility]) or [phone] ([facility])  GI Phone# [phone] ([facility])  Page on-call GI fellow via  from 5pm-8am and on weekends

## 2019-12-19 NOTE — DISCHARGE NOTE PROVIDER - CARE PROVIDER_API CALL
Elena Rivas (MD)  Cardiovascular Disease; Internal Medicine  [address] [place] [address], [facility] [address]  [place] [address]  Phone: [phone]  Fax: [phone]  Follow Up Time:

## 2019-12-19 NOTE — DISCHARGE NOTE PROVIDER - HOSPITAL COURSE
87 male HTN, HLD with recent admission found to have candidal esophagitis, H.pylori presents after fall/syncope with failure to thrive and continued odynophagia. Unclear whether fall was     mechanical vs. syncope. forehead bruise without ICH on CT -no events on telemetry;  but old  Q waves in inferior leads on ECG    -TTE uninterpretable, -troponins initially elevated, but delta was 5, no chest pain. and no evidence of active ischemia on ECG, just old Q waves. -orthostatics negative.    -monitor glucose.          patient with 40 lb weight loss of unknown etiology, low albumin. -it is also not clear in an immunocompetent male why he would have candida esophagitis. -CT c/a/p without cause of malignancy. Though not flex sig, no signs of colon malignancy. -HIV and hepatitis C is negative.    -cortisol normal. -continued  remeron.  patient's family do not want patient to undergo repeat S/S as had 4 in past few months.         Pt with episodes of hypoglycemia; holding metformin. -AM cortisol normal.     -ExV9R=5, no need for metformin when leaves here. outpatient CT chest showed thyroid nodule. TSH here normal, do not think cause of any symptoms, outpatient non-emergent thyroid US. 87 male HTN, HLD with recent admission found to have candidal esophagitis, H.pylori presents after fall/syncope with failure to thrive and continued odynophagia. Unclear whether fall was     mechanical vs. syncope. forehead bruise without ICH on CT -no events on telemetry;  but old  Q waves in inferior leads on ECG    -TTE uninterpretable, -troponins initially elevated, but delta was 5, no chest pain. and no evidence of active ischemia on ECG, just old Q waves. -orthostatics negative.    -monitor glucose.          patient with 40 lb weight loss of unknown etiology, low albumin. -it is also not clear in an immunocompetent male why he would have candida esophagitis. -CT c/a/p without cause of malignancy. Though not flex sig, no signs of colon malignancy. -HIV and hepatitis C is negative. -cortisol normal. -continued  remeron.  patient's family do not want patient to undergo repeat S/S as had 4 in past few months.         Pt with episodes of hypoglycemia; holding metformin. -AM cortisol normal. -HsH9S=6, no need for metformin when leaves here. outpatient CT chest showed thyroid nodule. TSH here normal, do not think cause of any symptoms, outpatient non-emergent thyroid US. Odynophagia improved, Pt discharged back to assisted living 87 male HTN, HLD with recent admission found to have candidal esophagitis, H.pylori presents after fall/syncope with failure to thrive and continued odynophagia. Unclear whether fall was     mechanical vs. syncope. forehead bruise without ICH on CT -no events on telemetry;  but old  Q waves in inferior leads on ECG; -TTE uninterpretable, -troponins initially elevated, but delta was 5, no chest pain. and no evidence of active ischemia on ECG, just old Q waves. -orthostatics negative.             patient with 40 lb weight loss of unknown etiology, low albumin. -it is also not clear in an immunocompetent male why he would have candida esophagitis. -CT c/a/p without cause of malignancy. Though not flex sig, no signs of colon malignancy. -HIV and hepatitis C is negative. -cortisol normal. -continued  remeron.  patient's family do not want patient to undergo repeat S/S as had 4 in past few months.         Pt with episodes of hypoglycemia; holding metformin. -AM cortisol normal. -UaR5U=7, no need for metformin when leaves here. outpatient CT chest showed thyroid nodule. TSH here normal, do not think cause of any symptoms, outpatient non-emergent thyroid US. Odynophagia improved, Pt discharged back to assisted living 87 male HTN, HLD with recent admission found to have candidal esophagitis, H.pylori presents after fall/syncope with failure to thrive and continued odynophagia. Unclear whether fall was     mechanical vs. syncope. forehead bruise without ICH on CT -no events on telemetry;  but old  Q waves in inferior leads on ECG; -TTE uninterpretable, -troponins initially elevated, but delta was 5, no chest pain. and no evidence of active ischemia on ECG, just old Q waves with negative orthostatics. TTE uninterpertable 2/2 poor windows, no events on telemetery so no further inpatietn workup for fall/syncope. Patient without odynophagia or dysphagia at  this time. Patient initially hypoglycemic in setting of metformin and poor appetite, resolved at time of admission and off of dextrose. Patient stabel for discharge with follow up with cardiology and gastroenterology.

## 2019-12-19 NOTE — PROGRESS NOTE ADULT - SUBJECTIVE AND OBJECTIVE BOX
Chief Complaint:  Patient is a 87y old  Male who presents with a chief complaint of fall (19 Dec 2019 08:24)    Interval Events: Initial plan to pursue EGD today, but after discussion w/ primary team, will not pursue. Pt reports eating without any issues and overall feels well.     Hospital Medications:  aspirin enteric coated 81 milliGRAM(s) Oral daily  BACItracin   Ointment 1 Application(s) Topical daily  clonazePAM  Tablet 0.5 milliGRAM(s) Oral at bedtime  dextrose 40% Gel 15 Gram(s) Oral once PRN  dextrose 5%. 1000 milliLiter(s) IV Continuous <Continuous>  dextrose 50% Injectable 12.5 Gram(s) IV Push once  dextrose 50% Injectable 25 Gram(s) IV Push once  dextrose 50% Injectable 25 Gram(s) IV Push once  escitalopram 10 milliGRAM(s) Oral daily  glucagon  Injectable 1 milliGRAM(s) IntraMuscular once PRN  heparin  Injectable 5000 Unit(s) SubCutaneous every 12 hours  mirtazapine 15 milliGRAM(s) Oral at bedtime  multivitamin 1 Tablet(s) Oral daily  pantoprazole    Tablet 40 milliGRAM(s) Oral before breakfast      ROS:   General:  No wt loss, fevers, chills, night sweats  Eyes:  Good vision, no reported pain  ENT:  No sore throat, pain, runny nose, dysphagia  CV:  No pain, palpitations, hypo/hypertension  Pulm:  No dyspnea, cough, tachypnea, wheezing  GI:  See HPI, otherwise negative  :  No pain, bleeding, incontinence, nocturia  Muscle:  No pain, weakness  Neuro:  No weakness, tingling, memory problems  Psych:  No fatigue, insomnia, mood problems, depression  Endocrine:  No polyuria, polydipsia, cold/heat intolerance  Heme:  No petechiae, ecchymosis, easy bruisability  Skin:  No rash, tattoos, scars, edema    PHYSICAL EXAM:   Vital Signs:  Vital Signs Last 24 Hrs  T(C): 36.6 (19 Dec 2019 04:48), Max: 36.7 (18 Dec 2019 14:54)  T(F): 97.8 (19 Dec 2019 04:48), Max: 98.1 (18 Dec 2019 14:54)  HR: 64 (19 Dec 2019 04:48) (64 - 82)  BP: 108/63 (19 Dec 2019 04:48) (107/71 - 113/71)  BP(mean): --  RR: 18 (19 Dec 2019 04:48) (18 - 18)  SpO2: 98% (19 Dec 2019 04:48) (95% - 98%)  Daily     Daily Weight in k.2 (19 Dec 2019 04:48)    GENERAL: no acute distress  NEURO: alert and oriented x 3, no asterixis  HEENT: anicteric sclera, no conjunctival pallor appreciated  CHEST: no respiratory distress, no accessory muscle use  CARDIAC: regular rate, rhythm  ABDOMEN: soft, non-tender, non-distended, no rebound or guarding  EXTREMITIES: warm, well perfused, no edema  SKIN: no lesions noted    LABS: reviewed                        11.8   7.54  )-----------( 212      ( 18 Dec 2019 09:16 )             37.8     12-18    134<L>  |  102  |  14  ----------------------------<  93  3.9   |  24  |  1.27    Ca    8.5      18 Dec 2019 09:16          Interval Diagnostic Studies: see sunrise for full report Chief Complaint:  Patient is a 87y old  Male who presents with a chief complaint of fall (19 Dec 2019 08:24)    Interval Events: Initial plan to pursue EGD today, but after discussion w/ primary team, will not pursue. Pt reports eating without any issues and overall feels well. NO dysphagia.    Hospital Medications:  aspirin enteric coated 81 milliGRAM(s) Oral daily  BACItracin   Ointment 1 Application(s) Topical daily  clonazePAM  Tablet 0.5 milliGRAM(s) Oral at bedtime  dextrose 40% Gel 15 Gram(s) Oral once PRN  dextrose 5%. 1000 milliLiter(s) IV Continuous <Continuous>  dextrose 50% Injectable 12.5 Gram(s) IV Push once  dextrose 50% Injectable 25 Gram(s) IV Push once  dextrose 50% Injectable 25 Gram(s) IV Push once  escitalopram 10 milliGRAM(s) Oral daily  glucagon  Injectable 1 milliGRAM(s) IntraMuscular once PRN  heparin  Injectable 5000 Unit(s) SubCutaneous every 12 hours  mirtazapine 15 milliGRAM(s) Oral at bedtime  multivitamin 1 Tablet(s) Oral daily  pantoprazole    Tablet 40 milliGRAM(s) Oral before breakfast      ROS:   General:  No wt loss, fevers, chills, night sweats  Eyes:  Good vision, no reported pain  ENT:  No sore throat, pain, runny nose, dysphagia  CV:  No pain, palpitations, hypo/hypertension  Pulm:  No dyspnea, cough, tachypnea, wheezing  GI:  See HPI, otherwise negative  :  No pain, bleeding, incontinence, nocturia  Muscle:  No pain, weakness  Neuro:  No weakness, tingling, memory problems  Psych:  No fatigue, insomnia, mood problems, depression  Endocrine:  No polyuria, polydipsia, cold/heat intolerance  Heme:  No petechiae, ecchymosis, easy bruisability  Skin:  No rash, tattoos, scars, edema    PHYSICAL EXAM:   Vital Signs:  Vital Signs Last 24 Hrs  T(C): 36.6 (19 Dec 2019 04:48), Max: 36.7 (18 Dec 2019 14:54)  T(F): 97.8 (19 Dec 2019 04:48), Max: 98.1 (18 Dec 2019 14:54)  HR: 64 (19 Dec 2019 04:48) (64 - 82)  BP: 108/63 (19 Dec 2019 04:48) (107/71 - 113/71)  BP(mean): --  RR: 18 (19 Dec 2019 04:48) (18 - 18)  SpO2: 98% (19 Dec 2019 04:48) (95% - 98%)  Daily     Daily Weight in k.2 (19 Dec 2019 04:48)    GENERAL: no acute distress  NEURO: alert and oriented x 3, no asterixis  HEENT: anicteric sclera, no conjunctival pallor appreciated  CHEST: no respiratory distress, no accessory muscle use  CARDIAC: regular rate, rhythm  ABDOMEN: soft, non-tender, non-distended, no rebound or guarding  EXTREMITIES: warm, well perfused, no edema  SKIN: no lesions noted    LABS: reviewed                        11.8   7.54  )-----------( 212      ( 18 Dec 2019 09:16 )             37.8     12-18    134<L>  |  102  |  14  ----------------------------<  93  3.9   |  24  |  1.27    Ca    8.5      18 Dec 2019 09:16          Interval Diagnostic Studies: see sunrise for full report

## 2019-12-19 NOTE — DISCHARGE NOTE PROVIDER - NSDCCPCAREPLAN_GEN_ALL_CORE_FT
PRINCIPAL DISCHARGE DIAGNOSIS  Diagnosis: Fall, initial encounter  Assessment and Plan of Treatment: Fall, initial encounter      SECONDARY DISCHARGE DIAGNOSES  Diagnosis: CKD (chronic kidney disease) stage 3, GFR 30-59 ml/min  Assessment and Plan of Treatment: CKD (chronic kidney disease) stage 3, GFR 30-59 ml/min    Diagnosis: Odynophagia  Assessment and Plan of Treatment: Odynophagia    Diagnosis: Closed head injury  Assessment and Plan of Treatment:     Diagnosis: Thyroid nodule  Assessment and Plan of Treatment: Thyroid nodule    Diagnosis: Hypoglycemia  Assessment and Plan of Treatment: Hypoglycemia PRINCIPAL DISCHARGE DIAGNOSIS  Diagnosis: Fall, initial encounter  Assessment and Plan of Treatment: continue physical therapy for strenght and balance tranining      SECONDARY DISCHARGE DIAGNOSES  Diagnosis: CKD (chronic kidney disease) stage 3, GFR 30-59 ml/min  Assessment and Plan of Treatment: Avoid taking (NSAIDs) - (ex: Ibuprofen, Advil, Celebrex, Naprosyn)  Avoid taking any nephrotoxic agents (can harm kidneys) - Intravenous contrast for diagnostic testing, combination cold medications.  Have all medications adjusted for your renal function by your Health Care Provider.  Blood pressure control is important.  Take all medication as prescribed.      Diagnosis: Odynophagia  Assessment and Plan of Treatment: soft diet with honey consistency fluid    Diagnosis: Closed head injury  Assessment and Plan of Treatment: Apply bacitracin to skin abrasion    Diagnosis: Thyroid nodule  Assessment and Plan of Treatment: Follow up with PMD for thyroid ultrasound    Diagnosis: Hypoglycemia  Assessment and Plan of Treatment: STOP metformin  Encourage PO intake PRINCIPAL DISCHARGE DIAGNOSIS  Diagnosis: Fall, initial encounter  Assessment and Plan of Treatment: continue physical therapy for strenght and balance tranining      SECONDARY DISCHARGE DIAGNOSES  Diagnosis: CKD (chronic kidney disease) stage 3, GFR 30-59 ml/min  Assessment and Plan of Treatment: Avoid taking (NSAIDs) - (ex: Ibuprofen, Advil, Celebrex, Naprosyn)  Avoid taking any nephrotoxic agents (can harm kidneys) - Intravenous contrast for diagnostic testing, combination cold medications.  Have all medications adjusted for your renal function by your Health Care Provider.  Blood pressure control is important.  Take all medication as prescribed.      Diagnosis: Odynophagia  Assessment and Plan of Treatment: soft diet    Diagnosis: Closed head injury  Assessment and Plan of Treatment: Apply bacitracin to skin abrasion    Diagnosis: Thyroid nodule  Assessment and Plan of Treatment: Follow up with PMD for thyroid ultrasound    Diagnosis: Hypoglycemia  Assessment and Plan of Treatment: STOP metformin  Encourage PO intake

## 2019-12-19 NOTE — DISCHARGE NOTE NURSING/CASE MANAGEMENT/SOCIAL WORK - PATIENT PORTAL LINK FT
You can access the FollowMyHealth Patient Portal offered by [facility] by registering at the following website: [email or web address]. By joining [email or web address]’s FollowMyHealth portal, you will also be able to view your health information using other applications (apps) compatible with our system.

## 2019-12-19 NOTE — CHART NOTE - NSCHARTNOTEFT_GEN_A_CORE
Patient seen and evaluted. Tolerated dinner. Glucose is within goal off of D5 gtt. Patient withotu dysphagia or odynophagia at this time. will discharge patient as no clear indication for EGD at this time. Discussed personally with patient and son regarding Q waves on ECG and outpatient follow up with Cards given no chest pain, no ECG changes that are new and no events on telemetry. Agreebale. Will also d/c metformin given HbA1C of 5, and metformin with the SE  of nausea and decreased appetite could be partially attributable to it.   D/C planning 35 minutes back to [facility]. Son to make appointments with Dr. Shanks and Dr. Guerra as outpatient.

## 2019-12-19 NOTE — DISCHARGE NOTE PROVIDER - NSDCMRMEDTOKEN_GEN_ALL_CORE_FT
aspirin 81 mg oral tablet: 1 tab(s) orally once a day in am. Patient may continue aspirin as per Dr. Tolentino  clonazePAM 0.5 mg oral tablet: 1 tab(s) orally once a day (at bedtime)  docusate sodium 100 mg oral capsule: 1 cap(s) orally 3 times a day  escitalopram 10 mg oral tablet: 1 tab(s) orally once a day in am  ferrous sulfate 75 mg (15 mg elemental iron) oral tablet: 2 tab(s) orally once a day  metFORMIN 1000 mg oral tablet: 1 tab(s) orally once a day in am  mirtazapine 15 mg oral tablet: 1 tab(s) orally once a day (at bedtime)  Multiple Vitamins oral capsule: 1 cap(s) orally once a day  pantoprazole 40 mg oral granule, delayed release: 40 milligram(s) orally once a day  polyethylene glycol 3350 oral powder for reconstitution: 17 gram(s) orally 2 times a day  Senna 8.6 mg oral tablet: 2 tab(s) orally once a day aspirin 81 mg oral tablet: 1 tab(s) orally once a day in am. Patient may continue aspirin as per Dr. Tolentino  bacitracin 500 units/g topical ointment: 1 application topically once a day  clonazePAM 0.5 mg oral tablet: 1 tab(s) orally once a day (at bedtime)  docusate sodium 100 mg oral capsule: 1 cap(s) orally 3 times a day  escitalopram 10 mg oral tablet: 1 tab(s) orally once a day in am  ferrous sulfate 75 mg (15 mg elemental iron) oral tablet: 2 tab(s) orally once a day  mirtazapine 15 mg oral tablet: 1 tab(s) orally once a day (at bedtime)  Multiple Vitamins oral capsule: 1 cap(s) orally once a day  pantoprazole 40 mg oral granule, delayed release: 40 milligram(s) orally once a day  polyethylene glycol 3350 oral powder for reconstitution: 17 gram(s) orally 2 times a day  Senna 8.6 mg oral tablet: 2 tab(s) orally once a day aspirin 81 mg oral tablet: 1 tab(s) orally once a day in am. Patient may continue aspirin as per Dr. Tolentino  bacitracin 500 units/g topical ointment: 1 application topically once a day  clonazePAM 0.5 mg oral tablet: 1 tab(s) orally once a day (at bedtime)  docusate sodium 100 mg oral capsule: 1 cap(s) orally 3 times a day  dronabinol 2.5 mg oral capsule: 1 cap(s) orally 2 times a day  escitalopram 10 mg oral tablet: 1 tab(s) orally once a day in am  ferrous sulfate 75 mg (15 mg elemental iron) oral tablet: 2 tab(s) orally once a day  mirtazapine 15 mg oral tablet: 1 tab(s) orally once a day (at bedtime)  Multiple Vitamins oral capsule: 1 cap(s) orally once a day  pantoprazole 40 mg oral granule, delayed release: 40 milligram(s) orally once a day  polyethylene glycol 3350 oral powder for reconstitution: 17 gram(s) orally 2 times a day  Senna 8.6 mg oral tablet: 2 tab(s) orally once a day

## 2020-04-05 ENCOUNTER — INPATIENT (INPATIENT)
Facility: HOSPITAL | Age: 85
LOS: 7 days | DRG: 871 | End: 2020-04-13
Attending: HOSPITALIST | Admitting: HOSPITALIST
Payer: MEDICARE

## 2020-04-05 VITALS
WEIGHT: 100.09 LBS | RESPIRATION RATE: 25 BRPM | HEIGHT: 68 IN | SYSTOLIC BLOOD PRESSURE: 89 MMHG | OXYGEN SATURATION: 100 % | DIASTOLIC BLOOD PRESSURE: 60 MMHG | HEART RATE: 81 BPM | TEMPERATURE: 100 F

## 2020-04-05 DIAGNOSIS — J96.90 RESPIRATORY FAILURE, UNSPECIFIED, UNSPECIFIED WHETHER WITH HYPOXIA OR HYPERCAPNIA: ICD-10-CM

## 2020-04-05 DIAGNOSIS — Z85.828 PERSONAL HISTORY OF OTHER MALIGNANT NEOPLASM OF SKIN: Chronic | ICD-10-CM

## 2020-04-05 DIAGNOSIS — G93.40 ENCEPHALOPATHY, UNSPECIFIED: ICD-10-CM

## 2020-04-05 DIAGNOSIS — A41.9 SEPSIS, UNSPECIFIED ORGANISM: ICD-10-CM

## 2020-04-05 DIAGNOSIS — J96.01 ACUTE RESPIRATORY FAILURE WITH HYPOXIA: ICD-10-CM

## 2020-04-05 DIAGNOSIS — N17.9 ACUTE KIDNEY FAILURE, UNSPECIFIED: ICD-10-CM

## 2020-04-05 DIAGNOSIS — J18.9 PNEUMONIA, UNSPECIFIED ORGANISM: ICD-10-CM

## 2020-04-05 DIAGNOSIS — Z71.89 OTHER SPECIFIED COUNSELING: ICD-10-CM

## 2020-04-05 DIAGNOSIS — Z29.9 ENCOUNTER FOR PROPHYLACTIC MEASURES, UNSPECIFIED: ICD-10-CM

## 2020-04-05 LAB
ALBUMIN SERPL ELPH-MCNC: 3.4 G/DL — SIGNIFICANT CHANGE UP (ref 3.3–5)
ALP SERPL-CCNC: 75 U/L — SIGNIFICANT CHANGE UP (ref 40–120)
ALT FLD-CCNC: 23 U/L — SIGNIFICANT CHANGE UP (ref 10–45)
ANION GAP SERPL CALC-SCNC: 20 MMOL/L — HIGH (ref 5–17)
APPEARANCE UR: ABNORMAL
APTT BLD: 35.2 SEC — SIGNIFICANT CHANGE UP (ref 27.5–36.3)
AST SERPL-CCNC: 25 U/L — SIGNIFICANT CHANGE UP (ref 10–40)
BASOPHILS # BLD AUTO: 0 K/UL — SIGNIFICANT CHANGE UP (ref 0–0.2)
BASOPHILS NFR BLD AUTO: 0 % — SIGNIFICANT CHANGE UP (ref 0–2)
BILIRUB SERPL-MCNC: 1 MG/DL — SIGNIFICANT CHANGE UP (ref 0.2–1.2)
BILIRUB UR-MCNC: NEGATIVE — SIGNIFICANT CHANGE UP
BUN SERPL-MCNC: 65 MG/DL — HIGH (ref 7–23)
CALCIUM SERPL-MCNC: 10.1 MG/DL — SIGNIFICANT CHANGE UP (ref 8.4–10.5)
CHLORIDE SERPL-SCNC: 99 MMOL/L — SIGNIFICANT CHANGE UP (ref 96–108)
CK SERPL-CCNC: 129 U/L — SIGNIFICANT CHANGE UP (ref 30–200)
CO2 SERPL-SCNC: 24 MMOL/L — SIGNIFICANT CHANGE UP (ref 22–31)
COLOR SPEC: YELLOW — SIGNIFICANT CHANGE UP
CREAT SERPL-MCNC: 2.88 MG/DL — HIGH (ref 0.5–1.3)
CRP SERPL-MCNC: 29.23 MG/DL — HIGH (ref 0–0.4)
D DIMER BLD IA.RAPID-MCNC: 1211 NG/ML DDU — HIGH
DIFF PNL FLD: ABNORMAL
EOSINOPHIL # BLD AUTO: 0 K/UL — SIGNIFICANT CHANGE UP (ref 0–0.5)
EOSINOPHIL NFR BLD AUTO: 0 % — SIGNIFICANT CHANGE UP (ref 0–6)
ERYTHROCYTE [SEDIMENTATION RATE] IN BLOOD: 120 MM/HR — HIGH (ref 0–20)
FERRITIN SERPL-MCNC: 5271 NG/ML — HIGH (ref 30–400)
GAS PNL BLDV: SIGNIFICANT CHANGE UP
GLUCOSE BLDC GLUCOMTR-MCNC: 225 MG/DL — HIGH (ref 70–99)
GLUCOSE SERPL-MCNC: 231 MG/DL — HIGH (ref 70–99)
GLUCOSE UR QL: NEGATIVE — SIGNIFICANT CHANGE UP
HCT VFR BLD CALC: 40.9 % — SIGNIFICANT CHANGE UP (ref 39–50)
HGB BLD-MCNC: 12.8 G/DL — LOW (ref 13–17)
INR BLD: 1.72 RATIO — HIGH (ref 0.88–1.16)
KETONES UR-MCNC: SIGNIFICANT CHANGE UP
LEUKOCYTE ESTERASE UR-ACNC: NEGATIVE — SIGNIFICANT CHANGE UP
LYMPHOCYTES # BLD AUTO: 0.53 K/UL — LOW (ref 1–3.3)
LYMPHOCYTES # BLD AUTO: 3.5 % — LOW (ref 13–44)
MCHC RBC-ENTMCNC: 31.3 GM/DL — LOW (ref 32–36)
MCHC RBC-ENTMCNC: 31.6 PG — SIGNIFICANT CHANGE UP (ref 27–34)
MCV RBC AUTO: 101 FL — HIGH (ref 80–100)
MONOCYTES # BLD AUTO: 0.79 K/UL — SIGNIFICANT CHANGE UP (ref 0–0.9)
MONOCYTES NFR BLD AUTO: 5.2 % — SIGNIFICANT CHANGE UP (ref 2–14)
NEUTROPHILS # BLD AUTO: 13.81 K/UL — HIGH (ref 1.8–7.4)
NEUTROPHILS NFR BLD AUTO: 82.6 % — HIGH (ref 43–77)
NITRITE UR-MCNC: NEGATIVE — SIGNIFICANT CHANGE UP
PH UR: 6 — SIGNIFICANT CHANGE UP (ref 5–8)
PLATELET # BLD AUTO: 205 K/UL — SIGNIFICANT CHANGE UP (ref 150–400)
POTASSIUM SERPL-MCNC: 4.1 MMOL/L — SIGNIFICANT CHANGE UP (ref 3.5–5.3)
POTASSIUM SERPL-SCNC: 4.1 MMOL/L — SIGNIFICANT CHANGE UP (ref 3.5–5.3)
PROCALCITONIN SERPL-MCNC: 2.11 NG/ML — HIGH (ref 0.02–0.1)
PROT SERPL-MCNC: 8 G/DL — SIGNIFICANT CHANGE UP (ref 6–8.3)
PROT UR-MCNC: ABNORMAL
PROTHROM AB SERPL-ACNC: 19.9 SEC — HIGH (ref 10–12.9)
RBC # BLD: 4.05 M/UL — LOW (ref 4.2–5.8)
RBC # FLD: 13.1 % — SIGNIFICANT CHANGE UP (ref 10.3–14.5)
SARS-COV-2 RNA SPEC QL NAA+PROBE: DETECTED
SODIUM SERPL-SCNC: 143 MMOL/L — SIGNIFICANT CHANGE UP (ref 135–145)
SP GR SPEC: 1.02 — SIGNIFICANT CHANGE UP (ref 1.01–1.02)
TROPONIN T, HIGH SENSITIVITY RESULT: 109 NG/L — HIGH (ref 0–51)
TROPONIN T, HIGH SENSITIVITY RESULT: 94 NG/L — HIGH (ref 0–51)
UROBILINOGEN FLD QL: ABNORMAL
WBC # BLD: 15.13 K/UL — HIGH (ref 3.8–10.5)
WBC # FLD AUTO: 15.13 K/UL — HIGH (ref 3.8–10.5)

## 2020-04-05 PROCEDURE — 99285 EMERGENCY DEPT VISIT HI MDM: CPT | Mod: GC

## 2020-04-05 PROCEDURE — 99223 1ST HOSP IP/OBS HIGH 75: CPT | Mod: AI

## 2020-04-05 PROCEDURE — 71045 X-RAY EXAM CHEST 1 VIEW: CPT | Mod: 26

## 2020-04-05 RX ORDER — DEXTROSE 50 % IN WATER 50 %
25 SYRINGE (ML) INTRAVENOUS ONCE
Refills: 0 | Status: DISCONTINUED | OUTPATIENT
Start: 2020-04-05 | End: 2020-04-13

## 2020-04-05 RX ORDER — PANTOPRAZOLE SODIUM 20 MG/1
40 TABLET, DELAYED RELEASE ORAL DAILY
Refills: 0 | Status: DISCONTINUED | OUTPATIENT
Start: 2020-04-05 | End: 2020-04-13

## 2020-04-05 RX ORDER — GLUCAGON INJECTION, SOLUTION 0.5 MG/.1ML
1 INJECTION, SOLUTION SUBCUTANEOUS ONCE
Refills: 0 | Status: DISCONTINUED | OUTPATIENT
Start: 2020-04-05 | End: 2020-04-13

## 2020-04-05 RX ORDER — VANCOMYCIN HCL 1 G
1000 VIAL (EA) INTRAVENOUS ONCE
Refills: 0 | Status: COMPLETED | OUTPATIENT
Start: 2020-04-05 | End: 2020-04-05

## 2020-04-05 RX ORDER — DEXTROSE 50 % IN WATER 50 %
12.5 SYRINGE (ML) INTRAVENOUS ONCE
Refills: 0 | Status: DISCONTINUED | OUTPATIENT
Start: 2020-04-05 | End: 2020-04-13

## 2020-04-05 RX ORDER — ACETAMINOPHEN 500 MG
1000 TABLET ORAL ONCE
Refills: 0 | Status: DISCONTINUED | OUTPATIENT
Start: 2020-04-05 | End: 2020-04-05

## 2020-04-05 RX ORDER — DEXTROSE 50 % IN WATER 50 %
15 SYRINGE (ML) INTRAVENOUS ONCE
Refills: 0 | Status: DISCONTINUED | OUTPATIENT
Start: 2020-04-05 | End: 2020-04-13

## 2020-04-05 RX ORDER — SODIUM CHLORIDE 9 MG/ML
1000 INJECTION INTRAMUSCULAR; INTRAVENOUS; SUBCUTANEOUS ONCE
Refills: 0 | Status: COMPLETED | OUTPATIENT
Start: 2020-04-05 | End: 2020-04-05

## 2020-04-05 RX ORDER — AZTREONAM 2 G
1000 VIAL (EA) INJECTION ONCE
Refills: 0 | Status: COMPLETED | OUTPATIENT
Start: 2020-04-05 | End: 2020-04-05

## 2020-04-05 RX ORDER — SODIUM CHLORIDE 9 MG/ML
1000 INJECTION, SOLUTION INTRAVENOUS
Refills: 0 | Status: DISCONTINUED | OUTPATIENT
Start: 2020-04-05 | End: 2020-04-13

## 2020-04-05 RX ORDER — HEPARIN SODIUM 5000 [USP'U]/ML
5000 INJECTION INTRAVENOUS; SUBCUTANEOUS EVERY 8 HOURS
Refills: 0 | Status: DISCONTINUED | OUTPATIENT
Start: 2020-04-05 | End: 2020-04-13

## 2020-04-05 RX ORDER — ACETAMINOPHEN 500 MG
650 TABLET ORAL ONCE
Refills: 0 | Status: COMPLETED | OUTPATIENT
Start: 2020-04-05 | End: 2020-04-05

## 2020-04-05 RX ORDER — SODIUM CHLORIDE 9 MG/ML
1000 INJECTION INTRAMUSCULAR; INTRAVENOUS; SUBCUTANEOUS
Refills: 0 | Status: DISCONTINUED | OUTPATIENT
Start: 2020-04-05 | End: 2020-04-06

## 2020-04-05 RX ORDER — INSULIN LISPRO 100/ML
VIAL (ML) SUBCUTANEOUS
Refills: 0 | Status: DISCONTINUED | OUTPATIENT
Start: 2020-04-05 | End: 2020-04-07

## 2020-04-05 RX ORDER — AZTREONAM 2 G
1000 VIAL (EA) INJECTION EVERY 8 HOURS
Refills: 0 | Status: DISCONTINUED | OUTPATIENT
Start: 2020-04-05 | End: 2020-04-06

## 2020-04-05 RX ADMIN — Medication 50 MILLIGRAM(S): at 23:19

## 2020-04-05 RX ADMIN — HEPARIN SODIUM 5000 UNIT(S): 5000 INJECTION INTRAVENOUS; SUBCUTANEOUS at 21:16

## 2020-04-05 RX ADMIN — Medication 40 MILLIGRAM(S): at 21:15

## 2020-04-05 RX ADMIN — SODIUM CHLORIDE 100 MILLILITER(S): 9 INJECTION INTRAMUSCULAR; INTRAVENOUS; SUBCUTANEOUS at 21:17

## 2020-04-05 RX ADMIN — Medication 50 MILLIGRAM(S): at 16:26

## 2020-04-05 RX ADMIN — Medication 650 MILLIGRAM(S): at 16:00

## 2020-04-05 RX ADMIN — SODIUM CHLORIDE 1000 MILLILITER(S): 9 INJECTION INTRAMUSCULAR; INTRAVENOUS; SUBCUTANEOUS at 16:26

## 2020-04-05 RX ADMIN — SODIUM CHLORIDE 100 MILLILITER(S): 9 INJECTION INTRAMUSCULAR; INTRAVENOUS; SUBCUTANEOUS at 17:19

## 2020-04-05 RX ADMIN — Medication 2: at 18:46

## 2020-04-05 RX ADMIN — Medication 250 MILLIGRAM(S): at 17:04

## 2020-04-05 NOTE — H&P ADULT - NSHPLABSRESULTS_GEN_ALL_CORE
WBC Count: 15.13 K/uL <H> ( 14:18)  Hematocrit: 40.9 % ( 14:18)  Platelet Count - Automated: 205 K/uL ( @ 14:18)  Hemoglobin: 12.8 g/dL <L> ( @ 14:18)  RBC Count: 4.05 M/uL <L> ( @ 14:18)          143  |  99  |  65<H>  ----------------------------<  231<H>  4.1   |  24  |  2.88<H>    Ca    10.1      2020 14:18    TPro  8.0  /  Alb  3.4  /  TBili  1.0  /  DBili  x   /  AST  25  /  ALT  23  /  AlkPhos  75  04-          CARDIAC MARKERS ( 2020 14:18 )  x     / x     / 129 U/L / x     / x            PT/INR - ( 2020 14:18 )   PT: 19.9 sec;   INR: 1.72 ratio         PTT - ( 2020 14:18 )  PTT:35.2 sec    Urinalysis Basic - ( 2020 14:35 )    Color: Yellow / Appearance: Slightly Turbid / S.020 / pH: x  Gluc: x / Ketone: Trace  / Bili: Negative / Urobili: 2 mg/dL   Blood: x / Protein: 100 mg/dL / Nitrite: Negative   Leuk Esterase: Negative / RBC: 2 /hpf / WBC 8 /HPF   Sq Epi: x / Non Sq Epi: 16 /hpf / Bacteria: Negative        Aspartate Aminotransferase (AST/SGOT): 25 U/L ( @ 14:18)  Albumin, Serum: 3.4 g/dL ( @ 14:18)  Alanine Aminotransferase (ALT/SGPT): 23 U/L ( @ 14:18)  Alkaline Phosphatase, Serum: 75 U/L ( @ 14:18)  Bilirubin Total, Serum: 1.0 mg/dL ( @ 14:18)            CXR: RML/RLL pneumonia

## 2020-04-05 NOTE — H&P ADULT - NSHPPHYSICALEXAM_GEN_ALL_CORE
lethargic but stable hemodynamics on NRB mask  appears frail  bitemporal wasting  no leg edema  no abdominal distension  no respiratory distress when seen

## 2020-04-05 NOTE — H&P ADULT - PROBLEM SELECTOR PLAN 7
extensive d/w son Kalpesh and daughter Irasema about current critical medical condition and conservative plan of care.  DNR DNI reaffirmed - witnessed by RN Carmelina.  Should there be signs of distress, comfort measures may be considered.  Family readily available via phone numbers in [place].

## 2020-04-05 NOTE — ED ADULT NURSE NOTE - NS ED NURSE REPORT GIVEN TO FT
Pt received bed assignment. Pt aware. Report given to Jany CAZARES. DESMOND. Pt stable for transport. Chart given to charge desk. Will cont to monitor.

## 2020-04-05 NOTE — ED ADULT NURSE NOTE - OBJECTIVE STATEMENT
87 y M hx of DM and HLD presents via EMS from [facility] with hypoxia. EMS reports to pt. being treated for pneumonia over the course of 2 weeks with symptoms becoming worse and the pt oxygen saturation to be in the 70s on arrival. EMS reports to 3 DOA's on same [facility] floor for suspected COVID-19. Pt. saturating in the 90's% on 100 NRB on arrival. Placed on cardicac monitor- sinus tachycardia noted in 120s. A&Ox0, nonverbal on arrival. Breathing tachypneic in 30s- rhonchi noted in left lung field. Skin warm and dry, stage II pressure ulcer noted on sacrum. Safety maintained- bed locked in lowest position.

## 2020-04-05 NOTE — H&P ADULT - ASSESSMENT
87 male with sepsis from pneumonia, r/o COVID along with DIONTE/dehydration/ATN, acute metabolic encephalopathy in setting of acute hypoxic respiratory failure.

## 2020-04-05 NOTE — ED ADULT TRIAGE NOTE - HEART RATE (BEATS/MIN)
9.10am-9.50am  I discussed with Daughter Shyann (Who is RN in [place]) at length about the overall goals for her father. She seems to understand but is fixated on smaller things like timing of medications, regular BMs, Soft mittens instead of 1:1 for ensuring safety of not pulling lines. She is worries about PEG placement that her father would not come out of anesthesia/sedation -as he Past history of difficult awaking post sedation after his cataract sx. She wants to take him home but feels we should give him a chance to fight this acute illness. She agreed that she tried to feed her father some water and food which he likes and tolerated fine per her. I explained the risks of aspiration and developing chemical pneumonitis again.   In nutshell, We will get PT eval, discuss with speech therapy about whether after pt some better awake will recommend a repeat eval? Explained daughter that we will need to make some decisions depending on above if he continues to not follow commands and tolerate any po foods about home with hospice care vs Rehab with PEG placement. Await PT eval and repeat Swallow eval  Further plan pending above Await PT eval and repeat swallow eval today. Then will re-visit further plans with daughter. Discussed with daughter at length. She is coming to terms that this is advanced parkinson's disease and her father who was once a very vibrant personality is now almost bedbound and dependent. But She wants him more comfortable at this time, Does not want PEG tube. Wants to take him home with hospice care. Hospice consulted. Will need NG tube removal, Could do comfort feeds at home. Daughter understands the risks of aspiration. Will arrange. Will arrange for home with hospice on comfort feeds after detailed discussion with daughter 2/8. DC NG tube 81 Plan to discuss with daughter as MD  had in regards to goals of care Daughter would like PT eval and swallow eval before having   her dad go on hospice at home Daughter does not want any artificial  feedings plan to discuss goals of care with daughter,  advanced care planning hospice eval placed

## 2020-04-05 NOTE — ED ADULT NURSE NOTE - NSIMPLEMENTINTERV_GEN_ALL_ED
Implemented All Fall with Harm Risk Interventions:  [place] to call system. Call bell, personal items and telephone within reach. Instruct patient to call for assistance. Room bathroom lighting operational. Non-slip footwear when patient is off stretcher. Physically safe environment: no spills, clutter or unnecessary equipment. Stretcher in lowest position, wheels locked, appropriate side rails in place. Provide visual cue, wrist band, yellow gown, etc. Monitor gait and stability. Monitor for mental status changes and reorient to person, place, and time. Review medications for side effects contributing to fall risk. Reinforce activity limits and safety measures with patient and family. Provide visual clues: red socks.

## 2020-04-05 NOTE — ED ADULT NURSE REASSESSMENT NOTE - NS ED NURSE REASSESS COMMENT FT1
Pt de-saturated to 89% on NC, pt placed back on NRB mask 15 L. Pt de-saturated to 89% on 5 L NC, pt placed back on NRB mask 15 L.

## 2020-04-05 NOTE — ED PROVIDER NOTE - CLINICAL SUMMARY MEDICAL DECISION MAKING FREE TEXT BOX
hypoxia, hypotensive, hx of pneumonia, unclear what treatment patient has received. Given pandemic and DOA;s at assisted living facility, concern for COVID19. Patient's hypoxia improved with NRB. Will check labs, cultures, COVID19 work up

## 2020-04-05 NOTE — ED ADULT NURSE REASSESSMENT NOTE - NS ED NURSE REASSESS COMMENT FT1
Report received from SAGE Frias and Daylin RN. Pt resting comfortably sleeping, 100% pulse ox on 15 L via NRB mask. Pt weaned to 4 L via NC. Safety and comfort maintained. Bed in lowest position. Pt admitted to medicine, awaiting a bed.

## 2020-04-05 NOTE — ED ADULT NURSE REASSESSMENT NOTE - NS ED NURSE REASSESS COMMENT FT1
Straight catheterization performed with RN Rigor at bedside- sterile field maintained. 150 cc of clear yellow urine drained.

## 2020-04-05 NOTE — H&P ADULT - PROBLEM SELECTOR PLAN 1
continue supportive care via NRB.  ABG would not change mgmt given lethargy and DNI.  f/up pulm recs  trial of iv steroids  NPO  aspiration precautions  pulse ox monitoring

## 2020-04-05 NOTE — ED ADULT NURSE REASSESSMENT NOTE - NS ED NURSE REASSESS COMMENT FT1
Hernández catheter inserted under sterile technique with 2 RN's at the bedside as per MD order. Pt tolerated well. Approximately 50 mL clear, yellow urine drained. Safety and comfort maintained. Call bell within reach. 16 fr Hernández catheter inserted under sterile technique with 2 RN's at the bedside as per MD order. Pt tolerated well. Approximately 50 mL clear, yellow urine drained. Safety and comfort maintained. Call bell within reach.

## 2020-04-05 NOTE — H&P ADULT - ATTENDING COMMENTS
overall prognosis is poor.  trial of IVF/abx/steroids reasonable.  plan of care discussed with medicine NP

## 2020-04-05 NOTE — ED PROVIDER NOTE - OBJECTIVE STATEMENT
Argenis DICKENS: Patient is an 88 yo M with history of DM, hyperlipidemia BIBEMS from [facility] assisted living for hypoxia and history of pneumonia for 2 weeks. Per EMS, patent was 76% on RA and hypotensive. [facility] had 3 DOA's found on the same floor as patient and there is concern for COVID19. Patient able to answer limited questions, reports no pain. He has a living will that states he does not want to be treated with life saving measures for any condition that would leave him mentally incapacitated or unable to eat.

## 2020-04-05 NOTE — ED ADULT NURSE REASSESSMENT NOTE - NS ED NURSE REASSESS COMMENT FT1
Report given to SAGE Chen in [place] at 1900. pt. admitted for possible COVID- ready to move. Vancomycin hanging. VSS.

## 2020-04-05 NOTE — H&P ADULT - HISTORY OF PRESENT ILLNESS
Patient is an 88 yo M with history of DM type 2, hyperlipidemia, frailty, esophagitis, bifascicular block BIBEMS from [facility] assisted living for hypoxia and history of pneumonia for one week. Per EMS, patent was 76% on RA and hypotensive. [facility] had 3 DOA's found on the same floor as patient and there is concern for COVID19. He has a living will that states he does not want to be treated with life saving measures for any condition that would leave him mentally incapacitated or unable to eat. CXR in ER showed RML/RLL pneumonia - given vanco and azactam, 1L fluid bolus for DIONTE/dehydration, sats high 90s on NRB mask.   D/w son Kalpesh and daughter Irasema about current critical medical condition and GOC.  Confirm DNR DNI per patient wishes and advance directives.  We will try IVF/steroids and antibiotics to see if he improves.  High risk for mortality given advanced age, multi-organ disease and frailty.  COVID PCR pending.

## 2020-04-06 DIAGNOSIS — Z71.89 OTHER SPECIFIED COUNSELING: ICD-10-CM

## 2020-04-06 DIAGNOSIS — Z51.5 ENCOUNTER FOR PALLIATIVE CARE: ICD-10-CM

## 2020-04-06 LAB
ALBUMIN SERPL ELPH-MCNC: 2.6 G/DL — LOW (ref 3.3–5)
ALP SERPL-CCNC: 63 U/L — SIGNIFICANT CHANGE UP (ref 40–120)
ALT FLD-CCNC: 16 U/L — SIGNIFICANT CHANGE UP (ref 10–45)
ANION GAP SERPL CALC-SCNC: 16 MMOL/L — SIGNIFICANT CHANGE UP (ref 5–17)
APTT BLD: 34.6 SEC — SIGNIFICANT CHANGE UP (ref 27.5–36.3)
AST SERPL-CCNC: 18 U/L — SIGNIFICANT CHANGE UP (ref 10–40)
BASE EXCESS BLDV CALC-SCNC: -2.8 MMOL/L — LOW (ref -2–2)
BASOPHILS # BLD AUTO: 0.02 K/UL — SIGNIFICANT CHANGE UP (ref 0–0.2)
BASOPHILS NFR BLD AUTO: 0.2 % — SIGNIFICANT CHANGE UP (ref 0–2)
BILIRUB SERPL-MCNC: 0.5 MG/DL — SIGNIFICANT CHANGE UP (ref 0.2–1.2)
BUN SERPL-MCNC: 72 MG/DL — HIGH (ref 7–23)
CA-I SERPL-SCNC: 1.09 MMOL/L — LOW (ref 1.12–1.3)
CALCIUM SERPL-MCNC: 9 MG/DL — SIGNIFICANT CHANGE UP (ref 8.4–10.5)
CHLORIDE BLDV-SCNC: 104 MMOL/L — SIGNIFICANT CHANGE UP (ref 96–108)
CHLORIDE SERPL-SCNC: 105 MMOL/L — SIGNIFICANT CHANGE UP (ref 96–108)
CO2 BLDV-SCNC: 21 MMOL/L — LOW (ref 22–30)
CO2 SERPL-SCNC: 21 MMOL/L — LOW (ref 22–31)
CREAT SERPL-MCNC: 2.93 MG/DL — HIGH (ref 0.5–1.3)
CULTURE RESULTS: NO GROWTH — SIGNIFICANT CHANGE UP
EOSINOPHIL # BLD AUTO: 0.02 K/UL — SIGNIFICANT CHANGE UP (ref 0–0.5)
EOSINOPHIL NFR BLD AUTO: 0.2 % — SIGNIFICANT CHANGE UP (ref 0–6)
GAS PNL BLDV: 138 MMOL/L — SIGNIFICANT CHANGE UP (ref 135–145)
GAS PNL BLDV: SIGNIFICANT CHANGE UP
GLUCOSE BLDC GLUCOMTR-MCNC: 190 MG/DL — HIGH (ref 70–99)
GLUCOSE BLDC GLUCOMTR-MCNC: 191 MG/DL — HIGH (ref 70–99)
GLUCOSE BLDV-MCNC: 140 MG/DL — HIGH (ref 70–99)
GLUCOSE SERPL-MCNC: 220 MG/DL — HIGH (ref 70–99)
HBA1C BLD-MCNC: 5.6 % — SIGNIFICANT CHANGE UP (ref 4–5.6)
HCO3 BLDV-SCNC: 20 MMOL/L — LOW (ref 21–29)
HCT VFR BLD CALC: 35.8 % — LOW (ref 39–50)
HCT VFR BLDA CALC: 46 % — SIGNIFICANT CHANGE UP (ref 39–50)
HGB BLD CALC-MCNC: 15 G/DL — SIGNIFICANT CHANGE UP (ref 13–17)
HGB BLD-MCNC: 10.8 G/DL — LOW (ref 13–17)
HOROWITZ INDEX BLDV+IHG-RTO: SIGNIFICANT CHANGE UP
IMM GRANULOCYTES NFR BLD AUTO: 0.4 % — SIGNIFICANT CHANGE UP (ref 0–1.5)
INR BLD: 1.62 RATIO — HIGH (ref 0.88–1.16)
LACTATE BLDV-MCNC: 3.1 MMOL/L — HIGH (ref 0.7–2)
LYMPHOCYTES # BLD AUTO: 0.35 K/UL — LOW (ref 1–3.3)
LYMPHOCYTES # BLD AUTO: 2.7 % — LOW (ref 13–44)
MCHC RBC-ENTMCNC: 30.2 GM/DL — LOW (ref 32–36)
MCHC RBC-ENTMCNC: 31.1 PG — SIGNIFICANT CHANGE UP (ref 27–34)
MCV RBC AUTO: 103.2 FL — HIGH (ref 80–100)
MONOCYTES # BLD AUTO: 0.2 K/UL — SIGNIFICANT CHANGE UP (ref 0–0.9)
MONOCYTES NFR BLD AUTO: 1.6 % — LOW (ref 2–14)
NEUTROPHILS # BLD AUTO: 12.14 K/UL — HIGH (ref 1.8–7.4)
NEUTROPHILS NFR BLD AUTO: 94.9 % — HIGH (ref 43–77)
OTHER CELLS CSF MANUAL: 19 ML/DL — SIGNIFICANT CHANGE UP (ref 18–22)
PCO2 BLDV: 30 MMHG — LOW (ref 35–50)
PH BLDV: 7.44 — SIGNIFICANT CHANGE UP (ref 7.35–7.45)
PLATELET # BLD AUTO: 175 K/UL — SIGNIFICANT CHANGE UP (ref 150–400)
PO2 BLDV: 62 MMHG — HIGH (ref 25–45)
POTASSIUM BLDV-SCNC: 4 MMOL/L — SIGNIFICANT CHANGE UP (ref 3.5–5.3)
POTASSIUM SERPL-MCNC: 4.1 MMOL/L — SIGNIFICANT CHANGE UP (ref 3.5–5.3)
POTASSIUM SERPL-SCNC: 4.1 MMOL/L — SIGNIFICANT CHANGE UP (ref 3.5–5.3)
PROT SERPL-MCNC: 6.4 G/DL — SIGNIFICANT CHANGE UP (ref 6–8.3)
PROTHROM AB SERPL-ACNC: 18.8 SEC — HIGH (ref 10–13.1)
RBC # BLD: 3.47 M/UL — LOW (ref 4.2–5.8)
RBC # FLD: 13.4 % — SIGNIFICANT CHANGE UP (ref 10.3–14.5)
SAO2 % BLDV: 92 % — HIGH (ref 67–88)
SODIUM SERPL-SCNC: 142 MMOL/L — SIGNIFICANT CHANGE UP (ref 135–145)
SPECIMEN SOURCE: SIGNIFICANT CHANGE UP
WBC # BLD: 12.78 K/UL — HIGH (ref 3.8–10.5)
WBC # FLD AUTO: 12.78 K/UL — HIGH (ref 3.8–10.5)

## 2020-04-06 PROCEDURE — 99223 1ST HOSP IP/OBS HIGH 75: CPT | Mod: GC

## 2020-04-06 RX ORDER — SODIUM CHLORIDE 9 MG/ML
1000 INJECTION, SOLUTION INTRAVENOUS
Refills: 0 | Status: DISCONTINUED | OUTPATIENT
Start: 2020-04-06 | End: 2020-04-07

## 2020-04-06 RX ORDER — AZTREONAM 2 G
1000 VIAL (EA) INJECTION EVERY 8 HOURS
Refills: 0 | Status: DISCONTINUED | OUTPATIENT
Start: 2020-04-06 | End: 2020-04-06

## 2020-04-06 RX ORDER — HYDROMORPHONE HYDROCHLORIDE 2 MG/ML
0.2 INJECTION INTRAMUSCULAR; INTRAVENOUS; SUBCUTANEOUS
Refills: 0 | Status: DISCONTINUED | OUTPATIENT
Start: 2020-04-06 | End: 2020-04-13

## 2020-04-06 RX ADMIN — HEPARIN SODIUM 5000 UNIT(S): 5000 INJECTION INTRAVENOUS; SUBCUTANEOUS at 05:43

## 2020-04-06 RX ADMIN — HEPARIN SODIUM 5000 UNIT(S): 5000 INJECTION INTRAVENOUS; SUBCUTANEOUS at 15:44

## 2020-04-06 RX ADMIN — Medication 50 MILLIGRAM(S): at 05:43

## 2020-04-06 RX ADMIN — Medication 40 MILLIGRAM(S): at 05:43

## 2020-04-06 RX ADMIN — Medication 40 MILLIGRAM(S): at 18:01

## 2020-04-06 RX ADMIN — SODIUM CHLORIDE 100 MILLILITER(S): 9 INJECTION INTRAMUSCULAR; INTRAVENOUS; SUBCUTANEOUS at 08:54

## 2020-04-06 RX ADMIN — Medication 2: at 08:54

## 2020-04-06 RX ADMIN — Medication 1: at 18:01

## 2020-04-06 RX ADMIN — PANTOPRAZOLE SODIUM 40 MILLIGRAM(S): 20 TABLET, DELAYED RELEASE ORAL at 13:54

## 2020-04-06 RX ADMIN — HEPARIN SODIUM 5000 UNIT(S): 5000 INJECTION INTRAVENOUS; SUBCUTANEOUS at 22:59

## 2020-04-06 RX ADMIN — SODIUM CHLORIDE 75 MILLILITER(S): 9 INJECTION, SOLUTION INTRAVENOUS at 16:49

## 2020-04-06 RX ADMIN — Medication 50 MILLIGRAM(S): at 14:44

## 2020-04-06 RX ADMIN — Medication 1: at 12:30

## 2020-04-06 NOTE — PROGRESS NOTE ADULT - PROBLEM SELECTOR PLAN 6
DVT Ppx: heparin SQ q8hr  Diet: advance diet as tolerated, currently NPO DVT Ppx: heparin SQ q8hr  Diet: advance diet as tolerated, currently NPO pending bedside swallow

## 2020-04-06 NOTE — CONSULT NOTE ADULT - PROBLEM SELECTOR RECOMMENDATION 5
Spoke with resident caring for patient on RCU along with son, Kalpesh. Patient had been very clear in the past that he wanted DNR/DNI and no aggressive interventions, however plan to continue with reasonable medical treatment. Instructed medicine resident on filling out MOLST which she was doing while on the phone. Son, Kalpesh very aware of tenuous situation and that there is a high liklihood patient's condition may worsen. He is familiar with palliative and hospice care and would also want to ensure his father's comfort.

## 2020-04-06 NOTE — PROGRESS NOTE ADULT - PROBLEM SELECTOR PLAN 3
P/W leukocytosis (15), elevated ESR (120), CRP (29) and Ferritin (5271), with tropenemia (94) in setting of COVID+ sepsis, possible pna (or both)   - iv abx: on aztreonam 1g q8h given allergy to PCN  - Positive COVID (4/5) and blood cultures  - c/w IVF  - monitor Is/Os P/W leukocytosis (15), elevated ESR (120), CRP (29) and Ferritin (5271), with tropenemia (94) in setting of COVID+ sepsis, possible pna (or both)   - iv abx: on aztreonam 1g q8h given allergy to PCN  - Positive COVID (4/5)   - f/u blood cultures  - c/w IVF  - monitor Is/Os

## 2020-04-06 NOTE — PROGRESS NOTE ADULT - PROBLEM SELECTOR PLAN 1
Likely in setting of COVID  - continue supportive care via NRB.  - ABG would not change mgmt given lethargy and DNI, so deferred  - f/up pulm recs  - trial of iv steroids (40mg q8h)  - NPO  - aspiration precautions  - pulse ox monitoring Likely in setting of COVID  - continue supportive care via NRB  - ABG would not change mgmt given lethargy and DNI, so deferred  - trial of iv steroids (methylprednisolone 40mg q8h), consider decreasing to 20mg BID given low weight  - NPO with IVF (D5 NS at 75cc/hr) pending trial bedside swallow study given lethargy  - aspiration precautions  - pulse ox monitoring Likely in setting of COVID  - continue supportive care via NRB  - ABG would not change mgmt given lethargy and DNI, so deferred  - trial of iv steroids (methylprednisolone 40mg q8h), decreased to 40mg BID given low weight  - NPO with IVF (D5 1/2 NS at 75cc/hr) given lethargy  - aspiration precautions  - bedside s/swallow to possibly advance diet  - pulse ox monitoring

## 2020-04-06 NOTE — PROGRESS NOTE ADULT - PROBLEM SELECTOR PLAN 5
likely due to dehydration, Cr 2.88 (BL 1.27 from 12/2019)  - possible ATN  - c/w gentle IVF  - avoid nephrotoxics. monitor intake and output.

## 2020-04-06 NOTE — CONSULT NOTE ADULT - ASSESSMENT
87 male with sepsis from pneumonia, r/o COVID along with DIONTE/dehydration/ATN, acute metabolic encephalopathy in setting of acute hypoxic respiratory failure.    Palliative care called for symptom management management in the setting of COVID 19 multiple medical morbidities.

## 2020-04-06 NOTE — CONSULT NOTE ADULT - PROBLEM SELECTOR RECOMMENDATION 6
See above. MOLST completed by medicine resident. Patient DNR/DNI. All reasonable medical care will be continued for treatment of COVID and pneumonia, however, no plans for intubation or resuscitation.    Palliative will continue to follow for symptom management.

## 2020-04-06 NOTE — PROGRESS NOTE ADULT - PROBLEM SELECTOR PLAN 2
bacterial vs. COVID vs both  NPO given lethargy  IV abx renal dose 2/2 bacterial vs. COVID vs both  - NPO given lethargy  - IV abx renally dosed. Aztreonam (4/5- ) d2 2/2 bacterial vs. COVID vs both  - NPO given lethargy  - IV abx renally dosed. Aztreonam (4/5- ) d2  - ID following; can d/c abx aztreonam given low likelihood of bacterial pna tomorrow

## 2020-04-06 NOTE — CONSULT NOTE ADULT - SUBJECTIVE AND OBJECTIVE BOX
Patient is a 87y old  Male who presents with a chief complaint of fever and hypoxia (2020 07:15)      HPI:  Patient is an 88 yo M with history of DM type 2, hyperlipidemia, frailty, esophagitis, bifascicular block BIBEMS from [facility] assisted living for hypoxia and history of pneumonia for one week. Per EMS, patent was 76% on RA and hypotensive. [facility] had 3 DOA's found on the same floor as patient and there is concern for COVID19. He has a living will that states he does not want to be treated with life saving measures for any condition that would leave him mentally incapacitated or unable to eat. CXR in ER showed RML/RLL pneumonia - given vanco and azactam, 1L fluid bolus for DIONTE/dehydration, sats high 90s on NRB mask.   D/w son Kalpesh and daughter Irasema about current critical medical condition and GOC.  Confirm DNR DNI per patient wishes and advance directives.  We will try IVF/steroids and antibiotics to see if he improves.  High risk for mortality given advanced age, multi-organ disease and frailty.  COVID PCR pending. (2020 17:42)    Above reviewed. patient from NH with classic findings of COVID19 tested positive on 2020. Imaging with infiltrate. Patient also noted to have leukocytosis with bandemia.   ID consulted for workup and antibiotic management     PAST MEDICAL & SURGICAL HISTORY:  Squamous cell carcinoma  Basal cell carcinoma of skin  Pancreatitis: years ago  Diabetes mellitus: type 2  Hyperlipidemia  Anxiety  Depression  H/O Moh's micrographic surgery for skin cancer: 18 right cheek  Renal cyst surgery in   Inguinal hernia s/p repair (L)      Allergies  penicillin (Rash)  quinidine (Unknown)        ANTIMICROBIALS:  aztreonam  IVPB 1000 every 8 hours      MEDICATIONS  (STANDING):  aztreonam  IVPB   50 mL/Hr IV Intermittent (20 @ 16:26)    aztreonam  IVPB   50 mL/Hr IV Intermittent (20 @ 05:43)   50 mL/Hr IV Intermittent (20 @ 23:19)    vancomycin  IVPB   250 mL/Hr IV Intermittent (20 @ 17:04)        OTHER MEDS: MEDICATIONS  (STANDING):  dextrose 40% Gel 15 once PRN  dextrose 50% Injectable 12.5 once  dextrose 50% Injectable 25 once  dextrose 50% Injectable 25 once  glucagon  Injectable 1 once PRN  heparin  Injectable 5000 every 8 hours  insulin lispro (HumaLOG) corrective regimen sliding scale  three times a day before meals  methylPREDNISolone sodium succinate Injectable 40 every 8 hours  pantoprazole  Injectable 40 daily      SOCIAL HISTORY:     No current toxic habits    FAMILY HISTORY:  FH: brain tumor  FH: diabetes mellitus      REVIEW OF SYSTEMS  [ X ] ROS unobtainable because:    [  ] All other systems negative except as noted below:	    Constitutional:  [ ] fever [ ] chills  [ ] weight loss  [ ] weakness  Skin:  [ ] rash [ ] phlebitis	  Eyes: [ ] icterus [ ] pain  [ ] discharge	  ENMT: [ ] sore throat  [ ] thrush [ ] ulcers [ ] exudates  Respiratory: [ ] dyspnea [ ] hemoptysis [ ] cough [ ] sputum	  Cardiovascular:  [ ] chest pain [ ] palpitations [ ] edema	  Gastrointestinal:  [ ] nausea [ ] vomiting [ ] diarrhea [ ] constipation [ ] pain	  Genitourinary:  [ ] dysuria [ ] frequency [ ] hematuria [ ] discharge [ ] flank pain  [ ] incontinence  Musculoskeletal:  [ ] myalgias [ ] arthralgias [ ] arthritis  [ ] back pain  Neurological:  [ ] headache [ ] seizures  [ ] confusion/altered mental status  Psychiatric:  [ ] anxiety [ ] depression	  Hematology/Lymphatics:  [ ] lymphadenopathy  Endocrine:  [ ] adrenal [ ] thyroid  Allergic/Immunologic:	 [ ] transplant [ ] seasonal    Vital Signs Last 24 Hrs  T(F): 97.6 (20 @ 06:06), Max: 101.9 (20 @ 13:15)    Vital Signs Last 24 Hrs  HR: 98 (20 @ 06:06) (81 - 126)  BP: 123/74 (20 @ 06:06) (89/60 - 123/74)  RR: 20 (20 @ 06:06)  SpO2: 98% (20 @ 06:06) (95% - 100%)  Wt(kg): --        WBC Count: 12.78 K/uL ( @ 07:25)  WBC Count: 15.13 K/uL ( @ 14:18)                            10.8   12.78 )-----------( 175      ( 2020 07:25 )             35.8           142  |  105  |  72<H>  ----------------------------<  220<H>  4.1   |  21<L>  |  2.93<H>    Ca    9.0      2020 07:25    TPro  6.4  /  Alb  2.6<L>  /  TBili  0.5  /  DBili  x   /  AST  18  /  ALT  16  /  AlkPhos  63  04-06      Creatinine Trend: 2.93<--, 2.88<--    Ferritin, Serum (20 @ 16:47)    Ferritin, Serum: 5271: Test Repeated ng/mL    Blood Gas Venous - Lactate (20 @ 02:45)    Blood Gas Venous - Lactate: 3.1: Elevated lactate. Consider ordering follow-up lactate to trend. mmoL/L    C-Reactive Protein, Serum (20 @ 17:00)    C-Reactive Protein, Serum: 29.23 mg/dL    Sedimentation Rate, Erythrocyte (20 @ 16:47)    Sedimentation Rate, Erythrocyte: 120 mm/hr    Procalcitonin, Serum (20 @ 14:18)    Procalcitonin, Serum: 2.11      Urinalysis Basic - ( 2020 14:35 )    Color: Yellow / Appearance: Slightly Turbid / S.020 / pH: x  Gluc: x / Ketone: Trace  / Bili: Negative / Urobili: 2 mg/dL   Blood: x / Protein: 100 mg/dL / Nitrite: Negative   Leuk Esterase: Negative / RBC: 2 /hpf / WBC 8 /HPF   Sq Epi: x / Non Sq Epi: 16 /hpf / Bacteria: Negative        MICROBIOLOGY:  COVID-19 PCR . (20 @ 14:35)    COVID-19 PCR: Detected: This test has been validated by [facility] to be accurate;  though it has not been FDA cleared/approved by the usual pathway.  As with all laboratory tests, results should be correlated with clinical  findings.  https://www.fda.gov/media/317045/download  https://www.fda.gov/media/699855/download      RADIOLOGY:  Xray Chest 1 View AP/PA (20 @ 14:03)  IMPRESSION:   1.  Right mid lung and right lower lung opacities can occur in the setting of infection, Including atypical causes of infection such as Covid 19. Patient is a 87y old  Male who presents with a chief complaint of fever and hypoxia (2020 07:15)      HPI:  Patient is an 88 yo M with history of DM type 2, hyperlipidemia, frailty, esophagitis, bifascicular block BIBEMS from [facility] assisted living for hypoxia and history of pneumonia for one week. Per EMS, patent was 76% on RA and hypotensive. [facility] had 3 DOA's found on the same floor as patient and there is concern for COVID19. He has a living will that states he does not want to be treated with life saving measures for any condition that would leave him mentally incapacitated or unable to eat. CXR in ER showed RML/RLL pneumonia - given vanco and azactam, 1L fluid bolus for DIONTE/dehydration, sats high 90s on NRB mask.   D/w son Kalpesh and daughter Irasema about current critical medical condition and GOC.  Confirm DNR DNI per patient wishes and advance directives.  We will try IVF/steroids and antibiotics to see if he improves.  High risk for mortality given advanced age, multi-organ disease and frailty.  COVID PCR pending. (2020 17:42)    Above reviewed. patient from NH with classic findings of COVID19 tested positive on 2020. Imaging with infiltrate. Patient also noted to have leukocytosis with bandemia.   ID consulted for workup and antibiotic management     PAST MEDICAL & SURGICAL HISTORY:  Squamous cell carcinoma  Basal cell carcinoma of skin  Pancreatitis: years ago  Diabetes mellitus: type 2  Hyperlipidemia  Anxiety  Depression  H/O Moh's micrographic surgery for skin cancer: 18 right cheek  Renal cyst surgery in   Inguinal hernia s/p repair (L)      Allergies  penicillin (Rash)  quinidine (Unknown)        ANTIMICROBIALS:  aztreonam  IVPB 1000 every 8 hours      MEDICATIONS  (STANDING):  aztreonam  IVPB   50 mL/Hr IV Intermittent (20 @ 16:26)    aztreonam  IVPB   50 mL/Hr IV Intermittent (20 @ 05:43)   50 mL/Hr IV Intermittent (20 @ 23:19)    vancomycin  IVPB   250 mL/Hr IV Intermittent (20 @ 17:04)        OTHER MEDS: MEDICATIONS  (STANDING):  dextrose 40% Gel 15 once PRN  dextrose 50% Injectable 12.5 once  dextrose 50% Injectable 25 once  dextrose 50% Injectable 25 once  glucagon  Injectable 1 once PRN  heparin  Injectable 5000 every 8 hours  insulin lispro (HumaLOG) corrective regimen sliding scale  three times a day before meals  methylPREDNISolone sodium succinate Injectable 40 every 8 hours  pantoprazole  Injectable 40 daily      SOCIAL HISTORY:     No current toxic habits, from assisted living facility.    FAMILY HISTORY:  FH: brain tumor  FH: diabetes mellitus      REVIEW OF SYSTEMS  [ X ] ROS unobtainable because:  patient not answering questions.  [  ] All other systems negative except as noted below:	    Constitutional:  [ ] fever [ ] chills  [ ] weight loss  [ ] weakness  Skin:  [ ] rash [ ] phlebitis	  Eyes: [ ] icterus [ ] pain  [ ] discharge	  ENMT: [ ] sore throat  [ ] thrush [ ] ulcers [ ] exudates  Respiratory: [ ] dyspnea [ ] hemoptysis [ ] cough [ ] sputum	  Cardiovascular:  [ ] chest pain [ ] palpitations [ ] edema	  Gastrointestinal:  [ ] nausea [ ] vomiting [ ] diarrhea [ ] constipation [ ] pain	  Genitourinary:  [ ] dysuria [ ] frequency [ ] hematuria [ ] discharge [ ] flank pain  [ ] incontinence  Musculoskeletal:  [ ] myalgias [ ] arthralgias [ ] arthritis  [ ] back pain  Neurological:  [ ] headache [ ] seizures  [ ] confusion/altered mental status  Psychiatric:  [ ] anxiety [ ] depression	  Hematology/Lymphatics:  [ ] lymphadenopathy  Endocrine:  [ ] adrenal [ ] thyroid  Allergic/Immunologic:	 [ ] transplant [ ] seasonal    Vital Signs Last 24 Hrs  T(F): 97.6 (20 @ 06:06), Max: 101.9 (20 @ 13:15)    Vital Signs Last 24 Hrs  HR: 98 (20 @ 06:06) (81 - 126)  BP: 123/74 (20 @ 06:06) (89/60 - 123/74)  RR: 20 (20 @ 06:06)  SpO2: 98% (20 @ 06:06) (95% - 100%)  Wt(kg): --    Physical Exam:  Gen: AAOx2, NAD, on NRB  HEENT: EOMI, NC, AT  Pulm: Coarse breath sounds bilaterally  Cardiac: +S1, S2, no murmurs  Abd: soft, nt, nd   : No brown  Ext: no edema, no wounds  Skin: DTI to sacrum  Neuro: not answering questions    WBC Count: 12.78 K/uL ( @ 07:25)  WBC Count: 15.13 K/uL ( @ 14:18)                            10.8   12.78 )-----------( 175      ( 2020 07:25 )             35.8           142  |  105  |  72<H>  ----------------------------<  220<H>  4.1   |  21<L>  |  2.93<H>    Ca    9.0      2020 07:25    TPro  6.4  /  Alb  2.6<L>  /  TBili  0.5  /  DBili  x   /  AST  18  /  ALT  16  /  AlkPhos  63        Creatinine Trend: 2.93<--, 2.88<--    Ferritin, Serum (20 @ 16:47)    Ferritin, Serum: 5271: Test Repeated ng/mL    Blood Gas Venous - Lactate (20 @ 02:45)    Blood Gas Venous - Lactate: 3.1: Elevated lactate. Consider ordering follow-up lactate to trend. mmoL/L    C-Reactive Protein, Serum (20 @ 17:00)    C-Reactive Protein, Serum: 29.23 mg/dL    Sedimentation Rate, Erythrocyte (20 @ 16:47)    Sedimentation Rate, Erythrocyte: 120 mm/hr    Procalcitonin, Serum (20 @ 14:18)    Procalcitonin, Serum: 2.11      Urinalysis Basic - ( 2020 14:35 )    Color: Yellow / Appearance: Slightly Turbid / S.020 / pH: x  Gluc: x / Ketone: Trace  / Bili: Negative / Urobili: 2 mg/dL   Blood: x / Protein: 100 mg/dL / Nitrite: Negative   Leuk Esterase: Negative / RBC: 2 /hpf / WBC 8 /HPF   Sq Epi: x / Non Sq Epi: 16 /hpf / Bacteria: Negative        MICROBIOLOGY:  COVID-19 PCR . (20 @ 14:35)    COVID-19 PCR: Detected: This test has been validated by [facility] to be accurate;  though it has not been FDA cleared/approved by the usual pathway.  As with all laboratory tests, results should be correlated with clinical  findings.  https://www.fda.gov/media/725529/download  https://www.fda.gov/media/059191/download      RADIOLOGY:  Xray Chest 1 View AP/PA (20 @ 14:03)  IMPRESSION:   1.  Right mid lung and right lower lung opacities can occur in the setting of infection, Including atypical causes of infection such as Covid 19.

## 2020-04-06 NOTE — PROGRESS NOTE ADULT - SUBJECTIVE AND OBJECTIVE BOX
Velma Madrid MD, S  Internal Medicine PGY1  [facility] Pager: [phone] | NS: [phone]  After 7pm, please call NF    Patient is a 87y old  Male who presents with a chief complaint of fever and hypoxia (2020 17:42)    Subjective: No acute events overnight. Patient seen and examined. Denies CP, HA, Abd pain, N/V, F/C, diarrhea/constipation, dysuria.    MEDICATIONS  (STANDING):  aztreonam  IVPB 1000 milliGRAM(s) IV Intermittent every 8 hours  dextrose 5%. 1000 milliLiter(s) (50 mL/Hr) IV Continuous <Continuous>  dextrose 50% Injectable 12.5 Gram(s) IV Push once  dextrose 50% Injectable 25 Gram(s) IV Push once  dextrose 50% Injectable 25 Gram(s) IV Push once  heparin  Injectable 5000 Unit(s) SubCutaneous every 8 hours  insulin lispro (HumaLOG) corrective regimen sliding scale   SubCutaneous three times a day before meals  methylPREDNISolone sodium succinate Injectable 40 milliGRAM(s) IV Push every 8 hours  pantoprazole  Injectable 40 milliGRAM(s) IV Push daily  sodium chloride 0.9%. 1000 milliLiter(s) (100 mL/Hr) IV Continuous <Continuous>    MEDICATIONS  (PRN):  dextrose 40% Gel 15 Gram(s) Oral once PRN Blood Glucose LESS THAN 70 milliGRAM(s)/deciliter  glucagon  Injectable 1 milliGRAM(s) IntraMuscular once PRN Glucose LESS THAN 70 milligrams/deciliter      Objective:  Vital Signs Last 24 Hrs  T(C): 36.4 (20 @ 06:06), Max: 38.8 (20 @ 13:15)  T(F): 97.6 (20 @ 06:06), Max: 101.9 (20 @ 13:15)  HR: 98 (20 @ 06:06) (81 - 126)  BP: 123/74 (20 @ 06:06) (89/60 - 123/74)  BP(mean): 76 (20 @ 20:48) (65 - 76)  RR: 20 (20 @ 06:06) (20 - 33)  SpO2: 98% (20 @ 06:06) (95% - 100%)                I&O's Summary    2020 07:01  -  2020 07:00  --------------------------------------------------------  IN: 0 mL / OUT: 350 mL / NET: -350 mL        PHYSICAL EXAM:  GENERAL: NAD, Alert and awake  EYES: EOMI, conjunctiva and sclera clear  LUNGS: Clear to auscultation bilaterally; No wheezing  HEART: Regular rate and rhythm; Normal S1 and S2; No murmurs  ABDOMEN: Soft, Nontender, Nondistended; Bowel sounds present  EXTREMITIES: No LE swelling, 2+ peripheral pulses    LABS:                        12.8   15.13 )-----------( 205      ( 2020 14:18 )             40.9       PT/INR - ( 2020 14:18 )   PT: 19.9 sec;   INR: 1.72 ratio         PTT - ( 2020 14:18 )  PTT:35.2 sec        04    143  |  99  |  65<H>  ----------------------------<  231<H>  4.1   |  24  |  2.88<H>    Ca    10.1      2020 14:18    TPro  8.0  /  Alb  3.4  /  TBili  1.0  /  DBili  x   /  AST  25  /  ALT  23  /  AlkPhos  75  04-05      Urinalysis Basic - ( 2020 14:35 )    Color: Yellow / Appearance: Slightly Turbid / S.020 / pH: x  Gluc: x / Ketone: Trace  / Bili: Negative / Urobili: 2 mg/dL   Blood: x / Protein: 100 mg/dL / Nitrite: Negative   Leuk Esterase: Negative / RBC: 2 /hpf / WBC 8 /HPF   Sq Epi: x / Non Sq Epi: 16 /hpf / Bacteria: Negative            CAPILLARY BLOOD GLUCOSE      POCT Blood Glucose.: 225 mg/dL (2020 18:45)      RADIOLOGY & ADDITIONAL TESTS:    Consultants involved in case:   Consultant(s) Notes Reviewed:  [ ] YES  [ ] NO:   Care Discussed with Consultants/Other Providers [ ] YES  [ ] NO Velma Madrid MD, S  Internal Medicine PGY1  [facility] Pager: [phone] | NS: [phone]  After 7pm, please call NF    Patient is a 87y old  Male who presents with a chief complaint of fever and hypoxia (2020 17:42)    Subjective: No acute events overnight. Patient seen and examined, unable to verbalize any complaints on ROS.     MEDICATIONS  (STANDING):  aztreonam  IVPB 1000 milliGRAM(s) IV Intermittent every 8 hours  dextrose 5%. 1000 milliLiter(s) (50 mL/Hr) IV Continuous <Continuous>  dextrose 50% Injectable 12.5 Gram(s) IV Push once  dextrose 50% Injectable 25 Gram(s) IV Push once  dextrose 50% Injectable 25 Gram(s) IV Push once  heparin  Injectable 5000 Unit(s) SubCutaneous every 8 hours  insulin lispro (HumaLOG) corrective regimen sliding scale   SubCutaneous three times a day before meals  methylPREDNISolone sodium succinate Injectable 40 milliGRAM(s) IV Push every 8 hours  pantoprazole  Injectable 40 milliGRAM(s) IV Push daily  sodium chloride 0.9%. 1000 milliLiter(s) (100 mL/Hr) IV Continuous <Continuous>    MEDICATIONS  (PRN):  dextrose 40% Gel 15 Gram(s) Oral once PRN Blood Glucose LESS THAN 70 milliGRAM(s)/deciliter  glucagon  Injectable 1 milliGRAM(s) IntraMuscular once PRN Glucose LESS THAN 70 milligrams/deciliter      Objective:  Vital Signs Last 24 Hrs  T(C): 36.4 (20 @ 06:06), Max: 38.8 (20 @ 13:15)  T(F): 97.6 (20 @ 06:06), Max: 101.9 (20 @ 13:15)  HR: 98 (20 @ 06:06) (81 - 126)  BP: 123/74 (20 @ 06:06) (89/60 - 123/74)  BP(mean): 76 (20 @ 20:48) (65 - 76)  RR: 20 (20 @ 06:06) (20 - 33)  SpO2: 98% (20 @ 06:06) (95% - 100%)                I&O's Summary    2020 07:01  -  2020 07:00  --------------------------------------------------------  IN: 0 mL / OUT: 350 mL / NET: -350 mL        PHYSICAL EXAM:  GENERAL: NAD, Alert and awake  EYES: EOMI, conjunctiva and sclera clear  LUNGS: Clear to auscultation bilaterally; No wheezing  HEART: Regular rate and rhythm; Normal S1 and S2; No murmurs  ABDOMEN: Soft, Nontender, Nondistended; Bowel sounds present  EXTREMITIES: No LE swelling, 2+ peripheral pulses    LABS:                        12.8   15.13 )-----------( 205      ( 2020 14:18 )             40.9       PT/INR - ( 2020 14:18 )   PT: 19.9 sec;   INR: 1.72 ratio         PTT - ( 2020 14:18 )  PTT:35.2 sec            143  |  99  |  65<H>  ----------------------------<  231<H>  4.1   |  24  |  2.88<H>    Ca    10.1      2020 14:18    TPro  8.0  /  Alb  3.4  /  TBili  1.0  /  DBili  x   /  AST  25  /  ALT  23  /  AlkPhos  75  04-05      Urinalysis Basic - ( 2020 14:35 )    Color: Yellow / Appearance: Slightly Turbid / S.020 / pH: x  Gluc: x / Ketone: Trace  / Bili: Negative / Urobili: 2 mg/dL   Blood: x / Protein: 100 mg/dL / Nitrite: Negative   Leuk Esterase: Negative / RBC: 2 /hpf / WBC 8 /HPF   Sq Epi: x / Non Sq Epi: 16 /hpf / Bacteria: Negative            CAPILLARY BLOOD GLUCOSE      POCT Blood Glucose.: 225 mg/dL (2020 18:45)      RADIOLOGY & ADDITIONAL TESTS:    Consultants involved in case:   Consultant(s) Notes Reviewed:  [ ] YES  [ ] NO:   Care Discussed with Consultants/Other Providers [ ] YES  [ ] NO Velma Madrid MD, S  Internal Medicine PGY1  [facility] Pager: [phone] | NS: [phone]  After 7pm, please call NF    Patient is a 87y old  Male who presents with a chief complaint of fever and hypoxia (2020 17:42)    Subjective: No acute events overnight. Patient seen and examined, unable to verbalize any complaints on ROS.     MEDICATIONS  (STANDING):  aztreonam  IVPB 1000 milliGRAM(s) IV Intermittent every 8 hours  dextrose 5%. 1000 milliLiter(s) (50 mL/Hr) IV Continuous <Continuous>  dextrose 50% Injectable 12.5 Gram(s) IV Push once  dextrose 50% Injectable 25 Gram(s) IV Push once  dextrose 50% Injectable 25 Gram(s) IV Push once  heparin  Injectable 5000 Unit(s) SubCutaneous every 8 hours  insulin lispro (HumaLOG) corrective regimen sliding scale   SubCutaneous three times a day before meals  methylPREDNISolone sodium succinate Injectable 40 milliGRAM(s) IV Push every 8 hours  pantoprazole  Injectable 40 milliGRAM(s) IV Push daily  sodium chloride 0.9%. 1000 milliLiter(s) (100 mL/Hr) IV Continuous <Continuous>    MEDICATIONS  (PRN):  dextrose 40% Gel 15 Gram(s) Oral once PRN Blood Glucose LESS THAN 70 milliGRAM(s)/deciliter  glucagon  Injectable 1 milliGRAM(s) IntraMuscular once PRN Glucose LESS THAN 70 milligrams/deciliter      Objective:  Vital Signs Last 24 Hrs  T(C): 36.4 (20 @ 06:06), Max: 38.8 (20 @ 13:15)  T(F): 97.6 (20 @ 06:06), Max: 101.9 (20 @ 13:15)  HR: 98 (20 @ 06:06) (81 - 126)  BP: 123/74 (20 @ 06:06) (89/60 - 123/74)  BP(mean): 76 (20 @ 20:48) (65 - 76)  RR: 20 (20 @ 06:06) (20 - 33)  SpO2: 98% (20 @ 06:06) (95% - 100%)                I&O's Summary    2020 07:01  -  2020 07:00  --------------------------------------------------------  IN: 0 mL / OUT: 350 mL / NET: -350 mL        PHYSICAL EXAM:  GENERAL: NAD, Alert and awake, thin elderly male  EYES: EOMI, conjunctiva and sclera clear  LUNGS: Clear to auscultation bilaterally; No wheezing  HEART: Regular rate and rhythm; Normal S1 and S2; No murmurs  ABDOMEN: Soft, Nontender, Nondistended; Bowel sounds present  EXTREMITIES: No LE swelling, 2+ peripheral pulses    LABS:                        10.8   12.78 )-----------( 175      ( 2020 07:25 )             35.8                         12.8   15.13 )-----------( 205      ( 2020 14:18 )             40.9     PT/INR - ( 2020 09:54 )   PT: 18.8 sec;   INR: 1.62 ratio         PTT - ( 2020 09:54 )  PTT:34.6 sec      04    142  |  105  |  72<H>  ----------------------------<  220<H>  4.1   |  21<L>  |  2.93<H>  04    143  |  99  |  65<H>  ----------------------------<  231<H>  4.1   |  24  |  2.88<H>    Ca    9.0      2020 07:25  Ca    10.1      2020 14:18    TPro  6.4  /  Alb  2.6<L>  /  TBili  0.5  /  DBili  x   /  AST  18  /  ALT  16  /  AlkPhos  63  04-06  TPro  8.0  /  Alb  3.4  /  TBili  1.0  /  DBili  x   /  AST  25  /  ALT  23  /  AlkPhos  75  04-05    Urinalysis Basic - ( 2020 14:35 )    Color: Yellow / Appearance: Slightly Turbid / S.020 / pH: x  Gluc: x / Ketone: Trace  / Bili: Negative / Urobili: 2 mg/dL   Blood: x / Protein: 100 mg/dL / Nitrite: Negative   Leuk Esterase: Negative / RBC: 2 /hpf / WBC 8 /HPF   Sq Epi: x / Non Sq Epi: 16 /hpf / Bacteria: Negative        CAPILLARY BLOOD GLUCOSE      POCT Blood Glucose.: 191 mg/dL (2020 11:53)  POCT Blood Glucose.: 225 mg/dL (2020 18:45)    Creatine Kinase, Serum: 129 U/L ( @ 14:18)    CARDIAC MARKERS ( 2020 14:18 )  x     / x     / 129 U/L / x     / x            IMAGING: Radiology personally reviewed  Consultants involved in case:   Consultant(s) Notes Reviewed:  [ ] YES  [ ] NO:   Care Discussed with Consultants/Other Providers [ ] YES  [ ] NO

## 2020-04-06 NOTE — DISCHARGE NOTE PROVIDER - NSDCFUADDAPPT_GEN_ALL_CORE_FT
Please follow up with your primary care doctor within two weeks-1 month of discharge from the hospital.

## 2020-04-06 NOTE — CONSULT NOTE ADULT - PROBLEM SELECTOR RECOMMENDATION 2
2/2 to COVID 19 infection and pneumonia. Hypotensive and was febrile upon arrival. Patient being treated for pneumonia with IV antibiotics, however no plans to escalate care to intubation for respiratory failure.

## 2020-04-06 NOTE — DISCHARGE NOTE PROVIDER - NSDCCPCAREPLAN_GEN_ALL_CORE_FT
PRINCIPAL DISCHARGE DIAGNOSIS  Diagnosis: Respiratory failure  Assessment and Plan of Treatment: You came in with hypoxic respiratory failure likely in the setting of viral COVID-19 infection. We treated you with a short 2 day course of antibiotics, as well as 5day course of IV steroids, and oxygen supplementation.  Please follow up with your primary care doctor within one week of discharge from the hospital, or call  the [place] [phone] discharge phone number.      SECONDARY DISCHARGE DIAGNOSES  Diagnosis: DIONTE (acute kidney injury)  Assessment and Plan of Treatment: For your acute kidney injury, likely in the setting of sepsis from COVID-19 viral infection, we treated you with gentle IV fluids to help your kidneys recover.   Please follow up with your primary care doctor within two weeks to 1 month of discharge from the hospital.       Diagnosis: Palliative care encounter  Assessment and Plan of Treatment: Given your covid-19 infection, we had palliative care evaluate you. They recommended starting IV dilaudid for acute dyspnea as needed for comfort. Goals of care discussion was had with multiple medical personnel and family members (son Kalpesh and daughter Irasema); in line with patient's living will and children's surrogate decision making, patient was made DNR/DNI and MOLST filled out and placed in chart during this admission.

## 2020-04-06 NOTE — PROGRESS NOTE ADULT - PROBLEM SELECTOR PLAN 4
likely related to acute infection and respiratory failure  -avoid sedatives/hypnotics  -supportive care for COVID as above likely related to acute infection and respiratory failure, nonverbal  - avoid sedatives/hypnotics  - supportive care for COVID as above

## 2020-04-06 NOTE — PROGRESS NOTE ADULT - PROBLEM SELECTOR PLAN 7
- s/p extensive d/w son Kalpesh and daughter Irasema about current critical medical condition and conservative plan of care.  - DNR DNI reaffirmed - witnessed by RN Carmelina.  - Should there be signs of distress, comfort measures may be considered.  - Family readily available via phone numbers in [place]. - s/p extensive d/w son Kalpesh and daughter Irasema about current critical medical condition and conservative plan of care.  - DNR DNI reaffirmed - witnessed by RN Carmelina.  - Should there be signs of distress, comfort measures may be considered.  - Family readily available via phone numbers in [place].  - Palliative consult placed, recs appreciated - s/p extensive d/w son Kalpesh and daughter Irasema about current critical medical condition and conservative plan of care.  - DNR DNI reaffirmed - witnessed by RN Carmelina.  - Should there be signs of distress, comfort measures may be considered.  - Family readily available via phone numbers in [place].  - Palliative consult placed, recs appreciated: ordered dilaudid prn for air hunger

## 2020-04-06 NOTE — CONSULT NOTE ADULT - SUBJECTIVE AND OBJECTIVE BOX
HPI:  Patient is an 86 yo M with history of DM type 2, hyperlipidemia, frailty, esophagitis, bifascicular block BIBEMS from [facility] assisted [facility] for hypoxia and history of pneumonia for one week. Per EMS, patent was 76% on RA and hypotensive. [facility] had 3 DOA's found on the same floor as patient and there is concern for COVID19. He has a living will that states he does not want to be treated with life saving measures for any condition that would leave him mentally incapacitated or unable to eat. CXR in ER showed RML/RLL pneumonia - given vanco and azactam, 1L fluid bolus for DIONTE/dehydration, sats high 90s on NRB mask.   D/w son Kalpesh and daughter Irasema about current critical medical condition and GOC.  Confirm DNR DNI per patient wishes and advance directives.  We will try IVF/steroids and antibiotics to see if he improves.  High risk for mortality given advanced age, multi-organ disease and frailty.  COVID PCR pending. (2020 17:42)    PERTINENT PM/SXH:   Squamous cell carcinoma  Basal cell carcinoma of skin  Pancreatitis  Hypertension  Diabetes mellitus  Hyperlipidemia  Anxiety  Depression    H/O Moh's micrographic surgery for skin cancer  Renal cyst surgery in   Inguinal hernia s/p repair (L)    FAMILY HISTORY:  FH: brain tumor  FH: diabetes mellitus    ITEMS NOT CHECKED ARE NOT PRESENT    SOCIAL HISTORY:   Significant other/partner:  [x ]  Children: son, Kalpesh and daughterIrasema  [ ]  Scientologist/Spirituality:  Substance hx:  [ ]   Tobacco hx:  [ ]   Alcohol hx: [ ]   Home Opioid hx:  [ ] I-Stop Reference No:  Living Situation: [ ]Home  [ ]Long term care  [ ]Rehab [x ]Other: assisted living facility    ADVANCE DIRECTIVES:    DNR  Yes  MOLST  [ ]  Living Will  [ ]   DECISION MAKER(s):  [ ] Health Care Proxy(s)  [x ] Surrogate(s): children Kalpesh and Irasema  [ ] Guardian           Name(s): Phone Number(s):    BASELINE (I)ADL(s) (prior to admission):  [place]: [ ]Total  [x ] Moderate [ ]Dependent    Allergies    penicillin (Rash)  quinidine (Unknown)    Intolerances    MEDICATIONS  (STANDING):  aztreonam  IVPB 1000 milliGRAM(s) IV Intermittent every 8 hours  dextrose 5%. 1000 milliLiter(s) (50 mL/Hr) IV Continuous <Continuous>  dextrose 50% Injectable 12.5 Gram(s) IV Push once  dextrose 50% Injectable 25 Gram(s) IV Push once  dextrose 50% Injectable 25 Gram(s) IV Push once  heparin  Injectable 5000 Unit(s) SubCutaneous every 8 hours  insulin lispro (HumaLOG) corrective regimen sliding scale   SubCutaneous three times a day before meals  methylPREDNISolone sodium succinate Injectable 40 milliGRAM(s) IV Push every 8 hours  pantoprazole  Injectable 40 milliGRAM(s) IV Push daily    MEDICATIONS  (PRN):  dextrose 40% Gel 15 Gram(s) Oral once PRN Blood Glucose LESS THAN 70 milliGRAM(s)/deciliter  glucagon  Injectable 1 milliGRAM(s) IntraMuscular once PRN Glucose LESS THAN 70 milligrams/deciliter    PRESENT SYMPTOMS: [ ]Unable to obtain due to poor mentation Due to COVID 19 and in an effort to decrease provider exposure and use of PPE, H&P, ROS, and/pr PE was taken from primary team's note.    Source if other than patient:  [ ]Family   [ ]Team     Pain: [ ] yes [ ] no Due to COVID 19, ROS per primary team.  QOL impact -   Location -                    Aggravating factors -  Quality -  Radiation -  Timing-  Severity (0-10 scale):  Minimal acceptable level (0-10 scale):     PAIN AD Score:     [email or web address] (press ctrl +  left click to view)    Due to COVID 19, ROS per primary team.    Dyspnea:                           [ ]Mild [ ]Moderate [ ]Severe  Anxiety:                             [ ]Mild [ ]Moderate [ ]Severe  Fatigue:                             [ ]Mild [ ]Moderate [ ]Severe  Nausea:                             [ ]Mild [ ]Moderate [ ]Severe  Loss of appetite:              [ ]Mild [ ]Moderate [ ]Severe  Constipation:                    [ ]Mild [ ]Moderate [ ]Severe    Other Symptoms:  [ ]All other review of systems negative     Karnofsky Performance Score/Palliative Performance Status Version 2:         %    http://npcrc.org/files/news/palliative_performance_scale_ppsv2.pdf  PHYSICAL EXAM:  Vital Signs Last 24 Hrs  T(C): 36.3 (2020 14:12), Max: 36.7 (2020 22:53)  T(F): 97.4 (2020 14:12), Max: 98.1 (2020 22:53)  HR: 89 (2020 14:12) (86 - 98)  BP: 134/77 (2020 14:12) (93/56 - 134/77)  BP(mean): 76 (2020 20:48) (65 - 76)  RR: 22 (2020 14:12) (20 - 28)  SpO2: 96% (2020 14:12) (95% - 100%) I&O's Summary    2020 07:01  -  2020 07:00  --------------------------------------------------------  IN: 0 mL / OUT: 350 mL / NET: -350 mL    GENERAL: Deferred due to COVID 19  [ ]Alert  [ ]Oriented x   [ ]Lethargic  [ ]Cachexia  [ ]Unarousable  [ ]Verbal  [ ]Non-Verbal  Behavioral: Deferred due to COVID 19  [ ] Anxiety  [ ] Delirium [ ] Agitation [ ] Other  HEENT: Deferred due to COVID 19  [ ]Normal   [ ]Dry mouth   [ ]ET Tube/Trach  [ ]Oral lesions  PULMONARY: Deferred due to COVID 19  [ ]Clear [ ]Tachypnea  [ ]Audible excessive secretions   [ ]Rhonchi        [ ]Right [ ]Left [ ]Bilateral  [ ]Crackles        [ ]Right [ ]Left [ ]Bilateral  [ ]Wheezing     [ ]Right [ ]Left [ ]Bilateral  CARDIOVASCULAR: Deferred due to COVID 19   [ ]Regular [ ]Irregular [ ]Tachy  [ ]Chapincito [ ]Murmur [ ]Other  GASTROINTESTINAL: Deferred due to COVID 19  [ ]Soft  [ ]Distended   [ ]+BS  [ ]Non tender [ ]Tender  [ ]PEG [ ]OGT/ NGT  Last BM:   GENITOURINARY: Deferred due to COVID 19  [ ]Normal [ ] Incontinent   [ ]Oliguria/Anuria   [ ]Hernández  MUSCULOSKELETAL: Deferred due to COVID 19  [ ]Normal   [ ]Weakness  [ ]Bed/Wheelchair bound [ ]Edema  NEUROLOGIC: Deferred due to COVID 19  [ ]No focal deficits  [ ] Cognitive impairment  [ ] Dysphagia [ ]Dysarthria [ ] Paresis [ ]Other   SKIN: Deferred due to COVID 19  [ ]Normal   [ ]Pressure ulcer(s)  [ ]Rash    CRITICAL CARE:  [ ] Shock Present  [ ]Septic [ ]Cardiogenic [ ]Neurologic [ ]Hypovolemic  [ ]  Vasopressors [ ]  Inotropes   [ ] Respiratory failure present [ ] mechanical ventilation [ ] non-invasive ventilatory support [ ] High flow  [ ] Acute  [ ] Chronic [ ] Hypoxic  [ ] Hypercarbic [ ] Other  [ ] Other organ failure     LABS:                        10.8   12.78 )-----------( 175      ( 2020 07:25 )             35.8       142  |  105  |  72<H>  ----------------------------<  220<H>  4.1   |  21<L>  |  2.93<H>    Ca    9.0      2020 07:25    TPro  6.4  /  Alb  2.6<L>  /  TBili  0.5  /  DBili  x   /  AST  18  /  ALT  16  /  AlkPhos  63    PT/INR - ( 2020 09:54 )   PT: 18.8 sec;   INR: 1.62 ratio         PTT - ( 2020 09:54 )  PTT:34.6 sec    Urinalysis Basic - ( 2020 14:35 )    Color: Yellow / Appearance: Slightly Turbid / S.020 / pH: x  Gluc: x / Ketone: Trace  / Bili: Negative / Urobili: 2 mg/dL   Blood: x / Protein: 100 mg/dL / Nitrite: Negative   Leuk Esterase: Negative / RBC: 2 /hpf / WBC 8 /HPF   Sq Epi: x / Non Sq Epi: 16 /hpf / Bacteria: Negative      RADIOLOGY & ADDITIONAL STUDIES:    PROTEIN CALORIE MALNUTRITION PRESENT: [ ] Yes [ ] No  [ ] PPSV2 < or = to 30% [ ] significant weight loss  [ ] poor nutritional intake [ ] catabolic state [ ] anasarca     Albumin, Serum: 2.6 g/dL (20 @ 07:25)  Artificial Nutrition [ ]     REFERRALS:   [ ]Chaplaincy  [ ] Hospice  [ ]Child Life  [ ]Social Work  [ ]Case management [ ]Holistic Therapy     Goals of Care Document:   Care Coordination Assessment [C. Provider] (20 @ 11:10)   Progress Notes - Care Coordination [C. Provider] (20 @ 11:08) HPI:  Patient is an 86 yo M with history of DM type 2, hyperlipidemia, frailty, esophagitis, bifascicular block BIBEMS from [facility] for hypoxia and history of pneumonia for one week. Per EMS, patent was 76% on RA and hypotensive. [facility] had 3 DOA's found on the same floor as patient and there is concern for COVID19. He has a living will that states he does not want to be treated with life saving measures for any condition that would leave him mentally incapacitated or unable to eat. CXR in ER showed RML/RLL pneumonia - given vanco and azactam, 1L fluid bolus for DIONTE/dehydration, sats high 90s on NRB mask.   D/w son Kalpesh and daughter Irasema about current critical medical condition and GOC.  Confirm DNR DNI per patient wishes and advance directives.  We will try IVF/steroids and antibiotics to see if he improves.  High risk for mortality given advanced age, multi-organ disease and frailty.  COVID PCR pending. (2020 17:42)    PERTINENT PM/SXH:   Squamous cell carcinoma  Basal cell carcinoma of skin  Pancreatitis  Hypertension  Diabetes mellitus  Hyperlipidemia  Anxiety  Depression    H/O Moh's micrographic surgery for skin cancer  Renal cyst surgery in   Inguinal hernia s/p repair (L)    FAMILY HISTORY:  FH: brain tumor  FH: diabetes mellitus    ITEMS NOT CHECKED ARE NOT PRESENT    SOCIAL HISTORY:   Significant other/partner:  [x ]  Children: son, Kalpesh and daughterIrasema  [ ]  Presybeterian/Spirituality:  Substance hx:  [ ]   Tobacco hx:  [ ]   Alcohol hx: [ ]   Home Opioid hx:  [ ] I-Stop Reference No:  Living Situation: [ ]Home  [ ]Long term care  [ ]Rehab [x ]Other: assisted living facility    ADVANCE DIRECTIVES:    DNR  Yes  MOLST  [ ]  Living Will  [ ]   DECISION MAKER(s):  [ ] Health Care Proxy(s)  [x ] Surrogate(s): children Inez  [ ] Guardian           Name(s): Phone Number(s):  Kalpesh [phone]    BASELINE (I)ADL(s) (prior to admission):  [place]: [ ]Total  [x ] Moderate [ ]Dependent    Allergies    penicillin (Rash)  quinidine (Unknown)    Intolerances    MEDICATIONS  (STANDING):  aztreonam  IVPB 1000 milliGRAM(s) IV Intermittent every 8 hours  dextrose 5%. 1000 milliLiter(s) (50 mL/Hr) IV Continuous <Continuous>  dextrose 50% Injectable 12.5 Gram(s) IV Push once  dextrose 50% Injectable 25 Gram(s) IV Push once  dextrose 50% Injectable 25 Gram(s) IV Push once  heparin  Injectable 5000 Unit(s) SubCutaneous every 8 hours  insulin lispro (HumaLOG) corrective regimen sliding scale   SubCutaneous three times a day before meals  methylPREDNISolone sodium succinate Injectable 40 milliGRAM(s) IV Push every 8 hours  pantoprazole  Injectable 40 milliGRAM(s) IV Push daily    MEDICATIONS  (PRN):  dextrose 40% Gel 15 Gram(s) Oral once PRN Blood Glucose LESS THAN 70 milliGRAM(s)/deciliter  glucagon  Injectable 1 milliGRAM(s) IntraMuscular once PRN Glucose LESS THAN 70 milligrams/deciliter    PRESENT SYMPTOMS: [x ]Unable to obtain due to poor mentation Due to COVID 19 and in an effort to decrease provider exposure and use of PPE, H&P, ROS, and/pr PE was taken from primary team's note.    Source if other than patient:  [ ]Family   [ x]Team     Pain: [ ] yes [x ] no Due to COVID 19, ROS per primary team.  QOL impact -   Location -                    Aggravating factors -  Quality -  Radiation -  Timing-  Severity (0-10 scale):  Minimal acceptable level (0-10 scale):     PAIN AD Score:     [email or web address].pdf (press ctrl +  left click to view)    Due to COVID 19, ROS per primary team.    Dyspnea:                           [x ]Mild [ ]Moderate [ ]Severe  Anxiety:                             [ ]Mild [ ]Moderate [ ]Severe  Fatigue:                             [ ]Mild [ ]Moderate [ ]Severe  Nausea:                             [ ]Mild [ ]Moderate [ ]Severe  Loss of appetite:              [ ]Mild [ ]Moderate [ ]Severe  Constipation:                    [ ]Mild [ ]Moderate [ ]Severe    Other Symptoms:  [ ]All other review of systems negative     Karnofsky Performance Score/Palliative Performance Status Version 2:       20  %    http://npcrc.org/files/news/palliative_performance_scale_ppsv2.pdf  PHYSICAL EXAM:  Vital Signs Last 24 Hrs  T(C): 36.3 (2020 14:12), Max: 36.7 (2020 22:53)  T(F): 97.4 (2020 14:12), Max: 98.1 (2020 22:53)  HR: 89 (2020 14:12) (86 - 98)  BP: 134/77 (2020 14:12) (93/56 - 134/77)  BP(mean): 76 (2020 20:48) (65 - 76)  RR: 22 (2020 14:12) (20 - 28)  SpO2: 96% (2020 14:12) (95% - 100%) I&O's Summary    2020 07:01  -  2020 07:00  --------------------------------------------------------  IN: 0 mL / OUT: 350 mL / NET: -350 mL    GENERAL: Deferred due to COVID 19  [ ]Alert  [ ]Oriented x   [ ]Lethargic  [ ]Cachexia  [ ]Unarousable  [ ]Verbal  [ ]Non-Verbal  Behavioral: Deferred due to COVID 19  [ ] Anxiety  [ ] Delirium [ ] Agitation [ ] Other  HEENT: Deferred due to COVID 19  [ ]Normal   [ ]Dry mouth   [ ]ET Tube/Trach  [ ]Oral lesions  PULMONARY: Deferred due to COVID 19  [ ]Clear [ ]Tachypnea  [ ]Audible excessive secretions   [ ]Rhonchi        [ ]Right [ ]Left [ ]Bilateral  [ ]Crackles        [ ]Right [ ]Left [ ]Bilateral  [ ]Wheezing     [ ]Right [ ]Left [ ]Bilateral  CARDIOVASCULAR: Deferred due to COVID 19   [ ]Regular [ ]Irregular [ ]Tachy  [ ]Chapincito [ ]Murmur [ ]Other  GASTROINTESTINAL: Deferred due to COVID 19  [ ]Soft  [ ]Distended   [ ]+BS  [ ]Non tender [ ]Tender  [ ]PEG [ ]OGT/ NGT  Last BM:   GENITOURINARY: Deferred due to COVID 19  [ ]Normal [ ] Incontinent   [ ]Oliguria/Anuria   [ ]Hernández  MUSCULOSKELETAL: Deferred due to COVID 19  [ ]Normal   [ ]Weakness  [ ]Bed/Wheelchair bound [ ]Edema  NEUROLOGIC: Deferred due to COVID 19  [ ]No focal deficits  [ ] Cognitive impairment  [ ] Dysphagia [ ]Dysarthria [ ] Paresis [ ]Other   SKIN: Deferred due to COVID 19  [ ]Normal   [ ]Pressure ulcer(s)  [ ]Rash    CRITICAL CARE:  [ ] Shock Present  [ ]Septic [ ]Cardiogenic [ ]Neurologic [ ]Hypovolemic  [ ]  Vasopressors [ ]  Inotropes   [ x] Respiratory failure present [ ] mechanical ventilation [ ] non-invasive ventilatory support [ ] High flow  [x ] Acute  [ ] Chronic [x ] Hypoxic  [ ] Hypercarbic [ ] Other  [x ] Other organ failure - renal    LABS:                        10.8   12.78 )-----------( 175      ( 2020 07:25 )             35.8   04-06    142  |  105  |  72<H>  ----------------------------<  220<H>  4.1   |  21<L>  |  2.93<H>    Ca    9.0      2020 07:25    TPro  6.4  /  Alb  2.6<L>  /  TBili  0.5  /  DBili  x   /  AST  18  /  ALT  16  /  AlkPhos  63  04-06  PT/INR - ( 2020 09:54 )   PT: 18.8 sec;   INR: 1.62 ratio         PTT - ( 2020 09:54 )  PTT:34.6 sec    Urinalysis Basic - ( 2020 14:35 )    Color: Yellow / Appearance: Slightly Turbid / S.020 / pH: x  Gluc: x / Ketone: Trace  / Bili: Negative / Urobili: 2 mg/dL   Blood: x / Protein: 100 mg/dL / Nitrite: Negative   Leuk Esterase: Negative / RBC: 2 /hpf / WBC 8 /HPF   Sq Epi: x / Non Sq Epi: 16 /hpf / Bacteria: Negative      RADIOLOGY & ADDITIONAL STUDIES:  < from: Xray Chest 1 View AP/PA (20 @ 14:03) >    EXAM:  XR CHEST AP OR PA 1V                            PROCEDURE DATE:  2020            INTERPRETATION:  CLINICAL INFORMATION: Shortness of breath, cough, fever.    TECHNIQUE: Portable AP radiograph of the chest    COMPARISON: Chest radiographs 2019.    FINDINGS:    Patchy opacity in the right midlung is new from 2019. In addition, there are patchy right lower lung opacities. No pleural effusion or pneumothorax.    Cardiac silhouette is normal.    Unremarkable skeletal structures.    IMPRESSION:   1.  Right mid lung and right lower lung opacities can occur in the setting of infection, Including atypical causes of infection such as Covid 19.                TYLER NARANJO M.D., RESIDENT RADIOLOGIST  This document has been electronically signed.  MARITZA ADAMS M.D., ATTENDING RADIOLOGIST  This document has been electronically signed. 2020  3:45PM                < end of copied text >      PROTEIN CALORIE MALNUTRITION PRESENT: [x ] Yes [ ] No  [x ] PPSV2 < or = to 30% [ ] significant weight loss  [x ] poor nutritional intake [ ] catabolic state [ ] anasarca     Albumin, Serum: 2.6 g/dL (20 @ 07:25)  Artificial Nutrition [ ]     REFERRALS:   [ ]Chaplaincy  [ ] Hospice  [ ]Child Life  [ ]Social Work  [ x]Case management [ ]Holistic Therapy     Goals of Care Document:   Care Coordination Assessment [C. Provider] (20 @ 11:10)   Progress Notes - Care Coordination [C. Provider] (20 @ 11:08)

## 2020-04-06 NOTE — DISCHARGE NOTE PROVIDER - HOSPITAL COURSE
Patient is an 86 yo M with history of DM type 2, hyperlipidemia, frailty, esophagitis, bifascicular block BIBEMS from [facility] for hypoxia and history of pneumonia for one week for which he was treated with antibiotics as outpatient (Levaquin x4d). Per EMS, patent was 76% on RA and hypotensive. [facility] had 3 DOA's found on the same floor as patient and there was concern for COVID19. He has a living will that states he does not want to be treated with life saving measures for any condition that would leave him mentally incapacitated or unable to eat- DNR/DNI was filled out and MOLST placed in chart.         On imaging, CXR in ER showed RML/RLL pneumonia - s/p vanco and azactam, then aztreonam for 2 days and was given some IVF for DIONTE/dehydration. Patient was put on NRB mask and was satting mid 90s. Treated with IV steroids (4/5-4/9). Found to be COVID positive (4/5), BCx NGTD.         For his DIONTE, was given gentle IVF (75cc/hr D5 1/2 NS) and Cr was trended.         On day of discharge, patient seen and examined.

## 2020-04-06 NOTE — CONSULT NOTE ADULT - ATTENDING COMMENTS
87 year old male with DM2, HLD, bifascicular block from [facility] for hypoxia and concern for COVID 19.    Recommend:  #Severe sepsis (fever, tachycardia, leukocytosis, hypotension) secondary to COVID 19 infection with viral PNA  -Continue supportive care  -Wean off oxygen as tolerated - currently on NRB  -Discontinue abx  -Blood cultures sent - f/u  -If able to tolerate po, can start plaquenil   -EKG if being started on Plaquenil    #Hypoxic resp failure  -Continue NRB  -Wean off NRB as tolerated    #Fever  -From COVID infection  -Continue to monitor fever curve    #Leukocytosis/ bandemia  -From COVID  -Does not appear to be aspirating  -Continue to trend    #DIONTE  -Continue to monitor Cr    Bruce Spicer MD  Pager [phone]  After 5pm/weekends call [phone]
Discussed with RODNEY Vuong and agree with the above note  Patient presents with respiratory distress in setting of covid19, currently on NRB, saturating well.  Goals of care defined with surrogate adult children, son Kalpesh and daughter Irasema  DNR/DNI, MOLST per primary team. Goal is for ongoing medical management but no heroic measures should patient decompensate  If acute dyspnea, would recommend low dose IV dilaudid for management (ex. 0.2mg Q3-4Hprn).  will continue to follow and remain available for acute issues/intractable symptoms, [phone]

## 2020-04-06 NOTE — CONSULT NOTE ADULT - ASSESSMENT
86 yo M with history of DM type 2, hyperlipidemia, frailty, esophagitis, bifascicular block BIBEMS from [facility] for hypoxia.  Found to have fever, hypoxia, tachypnea  Leukocytosis to 15k with bandemia and lymphopenia   CXR with R mid lung and right lower lobe opacities (I personally reviewed)  COVID19 PCR detected   Currently saturating 98% on NRB   Patient with other common lab findings of COVID19: high ferritin, CRP, lactate, ESR  reported history to N with rash. Appears to have tolerated ceftriaxone in 2012

## 2020-04-06 NOTE — DISCHARGE NOTE PROVIDER - NSDCMRMEDTOKEN_GEN_ALL_CORE_FT
aspirin 81 mg oral tablet: 1 tab(s) orally once a day in am. Patient may continue aspirin as per Dr. Tolentino  bacitracin 500 units/g topical ointment: 1 application topically once a day  clonazePAM 0.5 mg oral tablet: 1 tab(s) orally once a day (at bedtime)  docusate sodium 100 mg oral capsule: 1 cap(s) orally 3 times a day  dronabinol 2.5 mg oral capsule: 1 cap(s) orally 2 times a day  escitalopram 10 mg oral tablet: 1 tab(s) orally once a day in am  ferrous sulfate 75 mg (15 mg elemental iron) oral tablet: 2 tab(s) orally once a day  mirtazapine 15 mg oral tablet: 1 tab(s) orally once a day (at bedtime)  Multiple Vitamins oral capsule: 1 cap(s) orally once a day  pantoprazole 40 mg oral granule, delayed release: 40 milligram(s) orally once a day  polyethylene glycol 3350 oral powder for reconstitution: 17 gram(s) orally 2 times a day  Senna 8.6 mg oral tablet: 2 tab(s) orally once a day

## 2020-04-06 NOTE — DISCHARGE NOTE PROVIDER - CARE PROVIDER_API CALL
Elena Rivas (MD)  Cardiovascular Disease; Internal Medicine  [address] [place] [address], [facility] [address]  [place] [address]  Phone: [phone]  Fax: [phone]  Established Patient  Follow Up Time: 1 month

## 2020-04-06 NOTE — CONSULT NOTE ADULT - PROBLEM SELECTOR RECOMMENDATION 9
2/2 to novel COVID 19 infection. Saturations currently stable on non rebreather. No plans for intubation as patient had clear living will (as per H&P) and children would like to honor his wishes for DNR/DNI. Would recommend low dose prn Dilaudid for air hunger/dyspnea.

## 2020-04-07 LAB
ANION GAP SERPL CALC-SCNC: 14 MMOL/L — SIGNIFICANT CHANGE UP (ref 5–17)
BUN SERPL-MCNC: 80 MG/DL — HIGH (ref 7–23)
CALCIUM SERPL-MCNC: 9.3 MG/DL — SIGNIFICANT CHANGE UP (ref 8.4–10.5)
CHLORIDE SERPL-SCNC: 105 MMOL/L — SIGNIFICANT CHANGE UP (ref 96–108)
CO2 SERPL-SCNC: 24 MMOL/L — SIGNIFICANT CHANGE UP (ref 22–31)
CREAT ?TM UR-MCNC: 67 MG/DL — SIGNIFICANT CHANGE UP
CREAT SERPL-MCNC: 3.04 MG/DL — HIGH (ref 0.5–1.3)
CRP SERPL-MCNC: 16.12 MG/DL — HIGH (ref 0–0.4)
FERRITIN SERPL-MCNC: 7776 NG/ML — HIGH (ref 30–400)
GLUCOSE BLDC GLUCOMTR-MCNC: 215 MG/DL — HIGH (ref 70–99)
GLUCOSE BLDC GLUCOMTR-MCNC: 231 MG/DL — HIGH (ref 70–99)
GLUCOSE BLDC GLUCOMTR-MCNC: 253 MG/DL — HIGH (ref 70–99)
GLUCOSE BLDC GLUCOMTR-MCNC: 259 MG/DL — HIGH (ref 70–99)
GLUCOSE SERPL-MCNC: 264 MG/DL — HIGH (ref 70–99)
HCT VFR BLD CALC: 38.7 % — LOW (ref 39–50)
HGB BLD-MCNC: 11.7 G/DL — LOW (ref 13–17)
MAGNESIUM SERPL-MCNC: 2.3 MG/DL — SIGNIFICANT CHANGE UP (ref 1.6–2.6)
MCHC RBC-ENTMCNC: 30.2 GM/DL — LOW (ref 32–36)
MCHC RBC-ENTMCNC: 31.1 PG — SIGNIFICANT CHANGE UP (ref 27–34)
MCV RBC AUTO: 102.9 FL — HIGH (ref 80–100)
NRBC # BLD: 0 /100 WBCS — SIGNIFICANT CHANGE UP (ref 0–0)
PHOSPHATE SERPL-MCNC: 3.7 MG/DL — SIGNIFICANT CHANGE UP (ref 2.5–4.5)
PLATELET # BLD AUTO: 200 K/UL — SIGNIFICANT CHANGE UP (ref 150–400)
POTASSIUM SERPL-MCNC: 3.9 MMOL/L — SIGNIFICANT CHANGE UP (ref 3.5–5.3)
POTASSIUM SERPL-SCNC: 3.9 MMOL/L — SIGNIFICANT CHANGE UP (ref 3.5–5.3)
PROT ?TM UR-MCNC: 52 MG/DL — HIGH (ref 0–12)
PROT/CREAT UR-RTO: 0.8 RATIO — HIGH (ref 0–0.2)
RBC # BLD: 3.76 M/UL — LOW (ref 4.2–5.8)
RBC # FLD: 13.4 % — SIGNIFICANT CHANGE UP (ref 10.3–14.5)
SODIUM SERPL-SCNC: 143 MMOL/L — SIGNIFICANT CHANGE UP (ref 135–145)
SODIUM UR-SCNC: <35 MMOL/L — SIGNIFICANT CHANGE UP
WBC # BLD: 15.86 K/UL — HIGH (ref 3.8–10.5)
WBC # FLD AUTO: 15.86 K/UL — HIGH (ref 3.8–10.5)

## 2020-04-07 PROCEDURE — 99233 SBSQ HOSP IP/OBS HIGH 50: CPT

## 2020-04-07 PROCEDURE — 76770 US EXAM ABDO BACK WALL COMP: CPT | Mod: 26

## 2020-04-07 RX ORDER — SODIUM CHLORIDE 9 MG/ML
1000 INJECTION, SOLUTION INTRAVENOUS
Refills: 0 | Status: DISCONTINUED | OUTPATIENT
Start: 2020-04-07 | End: 2020-04-08

## 2020-04-07 RX ORDER — INSULIN LISPRO 100/ML
VIAL (ML) SUBCUTANEOUS EVERY 6 HOURS
Refills: 0 | Status: DISCONTINUED | OUTPATIENT
Start: 2020-04-07 | End: 2020-04-08

## 2020-04-07 RX ORDER — INSULIN LISPRO 100/ML
VIAL (ML) SUBCUTANEOUS AT BEDTIME
Refills: 0 | Status: DISCONTINUED | OUTPATIENT
Start: 2020-04-07 | End: 2020-04-07

## 2020-04-07 RX ADMIN — Medication 1: at 23:53

## 2020-04-07 RX ADMIN — Medication 40 MILLIGRAM(S): at 05:08

## 2020-04-07 RX ADMIN — SODIUM CHLORIDE 75 MILLILITER(S): 9 INJECTION, SOLUTION INTRAVENOUS at 23:52

## 2020-04-07 RX ADMIN — HEPARIN SODIUM 5000 UNIT(S): 5000 INJECTION INTRAVENOUS; SUBCUTANEOUS at 05:08

## 2020-04-07 RX ADMIN — PANTOPRAZOLE SODIUM 40 MILLIGRAM(S): 20 TABLET, DELAYED RELEASE ORAL at 14:28

## 2020-04-07 RX ADMIN — Medication 40 MILLIGRAM(S): at 17:13

## 2020-04-07 RX ADMIN — Medication 3: at 12:46

## 2020-04-07 RX ADMIN — SODIUM CHLORIDE 75 MILLILITER(S): 9 INJECTION, SOLUTION INTRAVENOUS at 14:27

## 2020-04-07 RX ADMIN — HEPARIN SODIUM 5000 UNIT(S): 5000 INJECTION INTRAVENOUS; SUBCUTANEOUS at 23:51

## 2020-04-07 RX ADMIN — HEPARIN SODIUM 5000 UNIT(S): 5000 INJECTION INTRAVENOUS; SUBCUTANEOUS at 12:47

## 2020-04-07 NOTE — PROGRESS NOTE ADULT - SUBJECTIVE AND OBJECTIVE BOX
Velma Madrid MD, S  Internal Medicine PGY1  [facility] Pager: [phone] | NS: [phone]  After 7pm, please call NF    Patient is a 87y old  Male who presents with a chief complaint of fever and hypoxia (2020 17:42)    Subjective: No acute events overnight. Patient seen and examined, no acute complaints on ROS. More talkative and communicative today.    MEDICATIONS  (STANDING):  dextrose 5% + sodium chloride 0.45%. 1000 milliLiter(s) (75 mL/Hr) IV Continuous <Continuous>  dextrose 5%. 1000 milliLiter(s) (50 mL/Hr) IV Continuous <Continuous>  dextrose 50% Injectable 12.5 Gram(s) IV Push once  dextrose 50% Injectable 25 Gram(s) IV Push once  dextrose 50% Injectable 25 Gram(s) IV Push once  heparin  Injectable 5000 Unit(s) SubCutaneous every 8 hours  insulin lispro (HumaLOG) corrective regimen sliding scale   SubCutaneous three times a day before meals  methylPREDNISolone sodium succinate Injectable 40 milliGRAM(s) IV Push two times a day  pantoprazole  Injectable 40 milliGRAM(s) IV Push daily    MEDICATIONS  (PRN):  dextrose 40% Gel 15 Gram(s) Oral once PRN Blood Glucose LESS THAN 70 milliGRAM(s)/deciliter  glucagon  Injectable 1 milliGRAM(s) IntraMuscular once PRN Glucose LESS THAN 70 milligrams/deciliter  HYDROmorphone  Injectable 0.2 milliGRAM(s) IV Push every 3 hours PRN dyspnea    Allergies    penicillin (Rash)  quinidine (Unknown)    Intolerances        VITAL SIGNS:  ICU Vital Signs Last 24 Hrs  T(C): 36.8 (2020 06:08), Max: 36.8 (2020 06:08)  T(F): 98.2 (2020 06:08), Max: 98.2 (2020 06:08)  HR: 85 (2020 06:08) (85 - 97)  BP: 128/76 (2020 06:08) (127/77 - 134/77)  BP(mean): --  ABP: --  ABP(mean): --  RR: 20 (2020 06:08) (20 - 22)  SpO2: 97% (2020 06:08) (96% - 98%)    PHYSICAL EXAM:  GENERAL: NAD, Alert and awake, thin elderly male  EYES: EOMI, conjunctiva and sclera clear  LUNGS: Clear to auscultation bilaterally; No wheezing  HEART: Regular rate and rhythm; Normal S1 and S2; No murmurs  ABDOMEN: Soft, Nontender, Nondistended; Bowel sounds present  EXTREMITIES: No LE swelling, 2+ peripheral pulses    LABS:                        11.7   15.86 )-----------( 200      ( 2020 07:26 )             38.7                         10.8   12.78 )-----------( 175      ( 2020 07:25 )             35.8                         12.8   15.13 )-----------( 205      ( 2020 14:18 )             40.9     PT/INR - ( 2020 09:54 )   PT: 18.8 sec;   INR: 1.62 ratio         PTT - ( 2020 09:54 )  PTT:34.6 sec          143  |  105  |  80<H>  ----------------------------<  264<H>  3.9   |  24  |  3.04<H>  04    142  |  105  |  72<H>  ----------------------------<  220<H>  4.1   |  21<L>  |  2.93<H>  0405    143  |  99  |  65<H>  ----------------------------<  231<H>  4.1   |  24  |  2.88<H>    Ca    9.3      2020 07:26  Ca    9.0      2020 07:25  Ca    10.1      2020 14:18  Phos  3.7     04-07  Mg     2.3     04-07    TPro  6.4  /  Alb  2.6<L>  /  TBili  0.5  /  DBili  x   /  AST  18  /  ALT  16  /  AlkPhos  63  04-06  TPro  8.0  /  Alb  3.4  /  TBili  1.0  /  DBili  x   /  AST  25  /  ALT  23  /  AlkPhos  75  04-05    Urinalysis Basic - ( 2020 14:35 )    Color: Yellow / Appearance: Slightly Turbid / S.020 / pH: x  Gluc: x / Ketone: Trace  / Bili: Negative / Urobili: 2 mg/dL   Blood: x / Protein: 100 mg/dL / Nitrite: Negative   Leuk Esterase: Negative / RBC: 2 /hpf / WBC 8 /HPF   Sq Epi: x / Non Sq Epi: 16 /hpf / Bacteria: Negative        CAPILLARY BLOOD GLUCOSE      POCT Blood Glucose.: 190 mg/dL (2020 17:50)  POCT Blood Glucose.: 191 mg/dL (2020 11:53)      CARDIAC MARKERS ( 2020 14:18 )  x     / x     / 129 U/L / x     / x            IMAGING: Radiology personally reviewed    POCT Blood Glucose.: 191 mg/dL (2020 11:53)  POCT Blood Glucose.: 225 mg/dL (2020 18:45)    Creatine Kinase, Serum: 129 U/L ( @ 14:18)    CARDIAC MARKERS ( 2020 14:18 )  x     / x     / 129 U/L / x     / x            IMAGING: Radiology personally reviewed  Consultants involved in case:   Consultant(s) Notes Reviewed:  [ ] YES  [ ] NO:   Care Discussed with Consultants/Other Providers [ ] YES  [ ] NO

## 2020-04-07 NOTE — PROGRESS NOTE ADULT - PROBLEM SELECTOR PLAN 2
2/2 COVID  - diet as above  - IV abx renally dosed. Aztreonam (4/5- 4/6) x 2d  - ID following, appreciate recs  - supportive care for covid, steroids 5d course (4/5-4/9)

## 2020-04-07 NOTE — PROGRESS NOTE ADULT - PROBLEM SELECTOR PLAN 5
likely due to dehydration, Cr 2.88 (BL 1.27 from 12/2019)  - possible ATN  - c/w gentle IVF (75cc/hr), continue given persistent rising Cr  - avoid nephrotoxics. monitor intake and output

## 2020-04-07 NOTE — CONSULT NOTE ADULT - SUBJECTIVE AND OBJECTIVE BOX
[facility]   MD CLIFTON MARTINEZ MD RUORU WONG, PA    TEL:  OFFICE: [phone]  DR VARGAS CELL: [phone]  GRAZYNA FAN CELL: [phone]  DR. MARTINO CELL: [phone]  DR. LUX CELl: [phone]    FROM 5 PM- 7 AM PLEASE CALL ANSWERING SERVICE AT [phone]    -- INITIAL RENAL CONSULT NOTE  --------------------------------------------------------------------------------  HPI:  86 yo M with history of DM type 2, hyperlipidemia, frailty, esophagitis, bifascicular block BIBEMS from [facility] for hypoxia and history of pneumonia for one week. Found to be COVID 19 +.   Nephrology consulted for DIONTE        PAST HISTORY  --------------------------------------------------------------------------------  PAST MEDICAL & SURGICAL HISTORY:  Squamous cell carcinoma  Basal cell carcinoma of skin  Pancreatitis: years ago  Diabetes mellitus: type 2  Hyperlipidemia  Anxiety  Depression  H/O Moh's micrographic surgery for skin cancer: 4/30/18 right cheek  Renal cyst surgery in 2008  Inguinal hernia s/p repair (L)    FAMILY HISTORY:  FH: brain tumor  FH: diabetes mellitus    PAST SOCIAL HISTORY:    ALLERGIES & MEDICATIONS  --------------------------------------------------------------------------------  Allergies    penicillin (Rash)  quinidine (Unknown)    Intolerances      Standing Inpatient Medications  dextrose 5%. 1000 milliLiter(s) IV Continuous <Continuous>  dextrose 50% Injectable 12.5 Gram(s) IV Push once  dextrose 50% Injectable 25 Gram(s) IV Push once  dextrose 50% Injectable 25 Gram(s) IV Push once  heparin  Injectable 5000 Unit(s) SubCutaneous every 8 hours  insulin lispro (HumaLOG) corrective regimen sliding scale   SubCutaneous three times a day before meals  lactated ringers. 1000 milliLiter(s) IV Continuous <Continuous>  methylPREDNISolone sodium succinate Injectable 40 milliGRAM(s) IV Push two times a day  pantoprazole  Injectable 40 milliGRAM(s) IV Push daily    PRN Inpatient Medications  dextrose 40% Gel 15 Gram(s) Oral once PRN  glucagon  Injectable 1 milliGRAM(s) IntraMuscular once PRN  HYDROmorphone  Injectable 0.2 milliGRAM(s) IV Push every 3 hours PRN      REVIEW OF SYSTEMS  --------------------------------------------------------------------------------  Gen: No fevers/chills  Skin: No rashes  Head/Eyes/Ears: Normal hearing,  Normal vision   Respiratory: No dyspnea, cough  CV: No chest pain  GI: No abdominal pain,   MSK: No  edema  Heme: No easy bruising or bleeding  Psych: No significant depression    All other systems were reviewed and are negative, except as noted.    VITALS/PHYSICAL EXAM  --------------------------------------------------------------------------------  T(C): 36.8 (04-07-20 @ 06:08), Max: 36.8 (04-07-20 @ 06:08)  HR: 85 (04-07-20 @ 06:08) (85 - 97)  BP: 128/76 (04-07-20 @ 06:08) (127/77 - 134/77)  RR: 20 (04-07-20 @ 06:08) (20 - 22)  SpO2: 97% (04-07-20 @ 06:08) (96% - 98%)  Wt(kg): --  Height (cm): 172.72 (04-05-20 @ 13:00)  Weight (kg): 45.4 (04-05-20 @ 13:00)  BMI (kg/m2): 15.2 (04-05-20 @ 13:00)  BSA (m2): 1.52 (04-05-20 @ 13:00)      04-06-20 @ 07:01  -  04-07-20 @ 07:00  --------------------------------------------------------  IN: 780 mL / OUT: 200 mL / NET: 580 mL    Pt not examined sec to COVID 19+  Physical Exam per medicine  GENERAL: NAD, Alert and awake, thin elderly male  EYES: EOMI, conjunctiva and sclera clear  LUNGS: Clear to auscultation bilaterally; No wheezing  HEART: Regular rate and rhythm; Normal S1 and S2; No murmurs  ABDOMEN: Soft, Nontender, Nondistended; Bowel sounds present  EXTREMITIES: No LE swelling, 2+    LABS/STUDIES  --------------------------------------------------------------------------------              11.7   15.86 >-----------<  200      [04-07-20 @ 07:26]              38.7     143  |  105  |  80  ----------------------------<  264      [04-07-20 @ 07:26]  3.9   |  24  |  3.04        Ca     9.3     [04-07-20 @ 07:26]      Mg     2.3     [04-07-20 @ 07:26]      Phos  3.7     [04-07-20 @ 07:26]    TPro  6.4  /  Alb  2.6  /  TBili  0.5  /  DBili  x   /  AST  18  /  ALT  16  /  AlkPhos  63  [04-06-20 @ 07:25]    PT/INR: PT 18.8 , INR 1.62       [04-06-20 @ 09:54]  PTT: 34.6       [04-06-20 @ 09:54]          [04-05-20 @ 14:18]    Creatinine Trend:  SCr 3.04 [04-07 @ 07:26]  SCr 2.93 [04-06 @ 07:25]  SCr 2.88 [04-05 @ 14:18]    Urinalysis - [04-05-20 @ 14:35]      Color Yellow / Appearance Slightly Turbid / SG 1.020 / pH 6.0      Gluc Negative / Ketone Trace  / Bili Negative / Urobili 2 mg/dL       Blood Moderate / Protein 100 mg/dL / Leuk Est Negative / Nitrite Negative      RBC 2 / WBC 8 / Hyaline 57 / Gran 25 / Sq Epi  / Non Sq Epi 16 / Bacteria Negative      Iron 28, TIBC 129, %sat 22      [10-16-19 @ 09:48]  Ferritin 7776      [04-07-20 @ 08:49]  HbA1c 5.6      [04-06-20 @ 09:54]  TSH 2.08      [12-17-19 @ 10:20]    HCV 0.11, Nonreact      [12-18-19 @ 12:29]  HIV Nonreact      [12-18-19 @ 12:40]

## 2020-04-07 NOTE — CONSULT NOTE ADULT - ASSESSMENT
88 yo M with history of DM type 2, hyperlipidemia, frailty, esophagitis, bifascicular block BIBEMS from [facility] for hypoxia and history of pneumonia for one week., found to be COVID 19 +.     DIONTE  Baseline Scr appears to be 1.1-1.3  Scr elevate to 3 today  DIONTE etiology?, possibly ATN from infection/ COVID   Check UA, Urine NA, Urine Cr  Check renal sonogram   s/p Vanco on 4/5-- monitor trough  On protonix - -monitor cbc with diff for AIN  Monitor BMP and Strict I/O  Avoid further nephrotoxics, NSAIDS RCA    Hematuria  Check Renal sonogram   MOnitor at present     Proteinuria  Check Urine Protein/Cr  Monitor at present

## 2020-04-07 NOTE — SWALLOW BEDSIDE ASSESSMENT ADULT - SLP PERTINENT HISTORY OF CURRENT PROBLEM
86 yo M with history of DM type 2, hyperlipidemia, frailty, esophagitis, bifascicular block BIBEMS from [facility] for hypoxia and history of pneumonia for one week. Per EMS, patent was 76% on RA and hypotensive. [facility] had 3 DOA's found on the same floor as patient and there is concern for COVID19. He has a living will that states he does not want to be treated with life saving measures for any condition that would leave him mentally incapacitated or unable to eat. CXR in ER showed RML/RLL pneumonia - given vanco and azactam, 1L fluid bolus for DIONTE/dehydration, sats high 90s on NRB mask.

## 2020-04-07 NOTE — SWALLOW BEDSIDE ASSESSMENT ADULT - SWALLOW EVAL: DIAGNOSIS
Consult for bedside swallow evaluation received and appreciated. Chart reviewed and events noted. As per EMR and discussion with MD Madrid, pt currently on NRB. Given current respiratory status, pt not appropriate for swallow assessment. Recommend NPO, with non-oral nutrition/hydration/medications -> this service to f/u for assessment pending improvement in pt's respiratory status/titration to decreased O2 supplementation. MD Madrid in agreement with POC.

## 2020-04-07 NOTE — CHART NOTE - NSCHARTNOTEFT_GEN_A_CORE
Spoke with resident Stella who reports patient is stable and doing well. Much more awake and alert today and tolerating the NRB with acceptable oxygen saturations. Spoke with son as well to update him. Plan for patient to receive speech and swallow study when able to wean off non rebreather. [facility] team to sign off as no acute inpatient needs identified. May reconsult if acute issues or intractable symptoms arise.

## 2020-04-07 NOTE — PROGRESS NOTE ADULT - PROBLEM SELECTOR PLAN 3
P/W leukocytosis (15), elevated ESR (120), CRP (29) and Ferritin (5271), with tropenemia (94) in setting of COVID+ sepsis, possible pna (or both)   - iv abx: on aztreonam 1g q8h given allergy to PCN  - Positive COVID (4/5)   - f/u blood cultures  - c/w IVF  - monitor Is/Os

## 2020-04-07 NOTE — PROGRESS NOTE ADULT - PROBLEM SELECTOR PLAN 4
likely related to acute infection and respiratory failure, nonverbal  - avoid sedatives/hypnotics  - supportive care for COVID as above

## 2020-04-07 NOTE — PROGRESS NOTE ADULT - PROBLEM SELECTOR PLAN 1
Likely in setting of COVID  - continue supportive care via NRB (8L on 4/7)  - c/w iv steroids: methylprednisolone 40mg BID given low weight 4/5-4/9  - NPO with IVF (D5 1/2 NS at 75cc/hr) given lethargy, awaiting bedside swallow eval before advancing diet  - aspiration precautions  - s/swallow eval ordered  - pulse ox monitoring

## 2020-04-07 NOTE — PROGRESS NOTE ADULT - PROBLEM SELECTOR PLAN 7
- s/p extensive d/w son Kalpesh and daughter Irasema about current critical medical condition and conservative plan of care.  - DNR DNI reaffirmed - witnessed by RN Carmelina.  - Should there be signs of distress, comfort measures may be considered.  - Family readily available via phone numbers in [place].  - Palliative consult placed, recs appreciated: ordered dilaudid prn for air hunger

## 2020-04-07 NOTE — PROGRESS NOTE ADULT - SUBJECTIVE AND OBJECTIVE BOX
CC: Patient is a 87y old  Male who presents with a chief complaint of fever and hypoxia (2020 11:28)    ID following for   Interval History/ROS:    Rest of ROS negative.    Allergies  penicillin (Rash)  quinidine (Unknown)        ANTIMICROBIALS:      OTHER MEDS:  dextrose 40% Gel 15 Gram(s) Oral once PRN  dextrose 5%. 1000 milliLiter(s) IV Continuous <Continuous>  dextrose 50% Injectable 12.5 Gram(s) IV Push once  dextrose 50% Injectable 25 Gram(s) IV Push once  dextrose 50% Injectable 25 Gram(s) IV Push once  glucagon  Injectable 1 milliGRAM(s) IntraMuscular once PRN  heparin  Injectable 5000 Unit(s) SubCutaneous every 8 hours  HYDROmorphone  Injectable 0.2 milliGRAM(s) IV Push every 3 hours PRN  insulin lispro (HumaLOG) corrective regimen sliding scale   SubCutaneous three times a day before meals  lactated ringers. 1000 milliLiter(s) IV Continuous <Continuous>  methylPREDNISolone sodium succinate Injectable 40 milliGRAM(s) IV Push two times a day  pantoprazole  Injectable 40 milliGRAM(s) IV Push daily      PE:    Vital Signs Last 24 Hrs  T(C): 36.8 (2020 06:08), Max: 36.8 (2020 06:08)  T(F): 98.2 (2020 06:08), Max: 98.2 (2020 06:08)  HR: 85 (2020 06:08) (85 - 97)  BP: 128/76 (2020 06:08) (127/77 - 134/77)  BP(mean): --  RR: 20 (2020 06:08) (20 - 22)  SpO2: 97% (2020 06:08) (96% - 98%)    Gen: AOx3, NAD, non-toxic, pleasant  CV: S1+S2 normal, no murmurs, nontachycardic  Resp: Clear bilat, no resp distress, no crackles/wheezes  Abd: Soft, nontender, +BS  Ext: No LE edema, no wounds  : No Hernández  IV/Skin: No thrombophlebitis  Neuro: no focal deficits    LABS:                          11.7   15.86 )-----------( 200      ( 2020 07:26 )             38.7       04-07    143  |  105  |  80<H>  ----------------------------<  264<H>  3.9   |  24  |  3.04<H>    Ca    9.3      2020 07:26  Phos  3.7     -  Mg     2.3     -    TPro  6.4  /  Alb  2.6<L>  /  TBili  0.5  /  DBili  x   /  AST  18  /  ALT  16  /  AlkPhos  63  -      Urinalysis Basic - ( 2020 14:35 )    Color: Yellow / Appearance: Slightly Turbid / S.020 / pH: x  Gluc: x / Ketone: Trace  / Bili: Negative / Urobili: 2 mg/dL   Blood: x / Protein: 100 mg/dL / Nitrite: Negative   Leuk Esterase: Negative / RBC: 2 /hpf / WBC 8 /HPF   Sq Epi: x / Non Sq Epi: 16 /hpf / Bacteria: Negative    MICROBIOLOGY:  v  .Urine Clean Catch (Midstream)  20   No growth  --  --      .Blood Blood-Peripheral  20   No growth to date.  --  --    RADIOLOGY:    < from: Xray Chest 1 View AP/PA (20 @ 14:03) >  IMPRESSION:   1.  Right mid lung and right lower lung opacities can occur in the setting of infection, Including atypical causes of infection such as Covid 19.    < end of copied text > CC: Patient is a 87y old  Male who presents with a chief complaint of fever and hypoxia (2020 11:28)    ID following for COVID19 infection    Interval History/ROS: Patient remains on NRB. No fevers.    Rest of ROS negative.    Allergies  penicillin (Rash)  quinidine (Unknown)    ANTIMICROBIALS:      OTHER MEDS:  dextrose 40% Gel 15 Gram(s) Oral once PRN  dextrose 5%. 1000 milliLiter(s) IV Continuous <Continuous>  dextrose 50% Injectable 12.5 Gram(s) IV Push once  dextrose 50% Injectable 25 Gram(s) IV Push once  dextrose 50% Injectable 25 Gram(s) IV Push once  glucagon  Injectable 1 milliGRAM(s) IntraMuscular once PRN  heparin  Injectable 5000 Unit(s) SubCutaneous every 8 hours  HYDROmorphone  Injectable 0.2 milliGRAM(s) IV Push every 3 hours PRN  insulin lispro (HumaLOG) corrective regimen sliding scale   SubCutaneous three times a day before meals  lactated ringers. 1000 milliLiter(s) IV Continuous <Continuous>  methylPREDNISolone sodium succinate Injectable 40 milliGRAM(s) IV Push two times a day  pantoprazole  Injectable 40 milliGRAM(s) IV Push daily      PE:    Vital Signs Last 24 Hrs  T(C): 36.8 (2020 06:08), Max: 36.8 (2020 06:08)  T(F): 98.2 (2020 06:08), Max: 98.2 (2020 06:08)  HR: 85 (2020 06:08) (85 - 97)  BP: 128/76 (2020 06:08) (127/77 - 134/77)  BP(mean): --  RR: 20 (2020 06:08) (20 - 22)  SpO2: 97% (2020 06:08) (96% - 98%)    Gen: NAD, on NRB  HEENT: EOMI, NC, AT  Pulm: Coarse breath sounds bilaterally  Cardiac: +S1, S2, no murmurs  Abd: soft, nt, nd   : No brown  Ext: no edema, no wounds  Skin: DTI to sacrum  Neuro: not answering questions    LABS:                          11.7   15.86 )-----------( 200      ( 2020 07:26 )             38.7       04-07    143  |  105  |  80<H>  ----------------------------<  264<H>  3.9   |  24  |  3.04<H>    Ca    9.3      2020 07:26  Phos  3.7     -  Mg     2.3         TPro  6.4  /  Alb  2.6<L>  /  TBili  0.5  /  DBili  x   /  AST  18  /  ALT  16  /  AlkPhos  63        Urinalysis Basic - ( 2020 14:35 )    Color: Yellow / Appearance: Slightly Turbid / S.020 / pH: x  Gluc: x / Ketone: Trace  / Bili: Negative / Urobili: 2 mg/dL   Blood: x / Protein: 100 mg/dL / Nitrite: Negative   Leuk Esterase: Negative / RBC: 2 /hpf / WBC 8 /HPF   Sq Epi: x / Non Sq Epi: 16 /hpf / Bacteria: Negative    MICROBIOLOGY:  v  .Urine Clean Catch (Midstream)  20   No growth  --  --      .Blood Blood-Peripheral  20   No growth to date.  --  --    RADIOLOGY:    < from: Xray Chest 1 View AP/PA (20 @ 14:03) >  IMPRESSION:   1.  Right mid lung and right lower lung opacities can occur in the setting of infection, Including atypical causes of infection such as Covid 19.    < end of copied text >

## 2020-04-08 LAB
ANION GAP SERPL CALC-SCNC: 15 MMOL/L — SIGNIFICANT CHANGE UP (ref 5–17)
APPEARANCE UR: CLEAR — SIGNIFICANT CHANGE UP
BASOPHILS # BLD AUTO: 0.03 K/UL — SIGNIFICANT CHANGE UP (ref 0–0.2)
BASOPHILS NFR BLD AUTO: 0.2 % — SIGNIFICANT CHANGE UP (ref 0–2)
BILIRUB UR-MCNC: NEGATIVE — SIGNIFICANT CHANGE UP
BUN SERPL-MCNC: 86 MG/DL — HIGH (ref 7–23)
CALCIUM SERPL-MCNC: 8.8 MG/DL — SIGNIFICANT CHANGE UP (ref 8.4–10.5)
CHLORIDE SERPL-SCNC: 109 MMOL/L — HIGH (ref 96–108)
CO2 SERPL-SCNC: 23 MMOL/L — SIGNIFICANT CHANGE UP (ref 22–31)
COLOR SPEC: YELLOW — SIGNIFICANT CHANGE UP
CREAT SERPL-MCNC: 2.97 MG/DL — HIGH (ref 0.5–1.3)
DIFF PNL FLD: ABNORMAL
EOSINOPHIL # BLD AUTO: 0 K/UL — SIGNIFICANT CHANGE UP (ref 0–0.5)
EOSINOPHIL NFR BLD AUTO: 0 % — SIGNIFICANT CHANGE UP (ref 0–6)
FIBRINOGEN AG PPP IA-MCNC: 825 MG/DL — HIGH
GLUCOSE BLDC GLUCOMTR-MCNC: 223 MG/DL — HIGH (ref 70–99)
GLUCOSE BLDC GLUCOMTR-MCNC: 235 MG/DL — HIGH (ref 70–99)
GLUCOSE BLDC GLUCOMTR-MCNC: 245 MG/DL — HIGH (ref 70–99)
GLUCOSE BLDC GLUCOMTR-MCNC: 246 MG/DL — HIGH (ref 70–99)
GLUCOSE SERPL-MCNC: 266 MG/DL — HIGH (ref 70–99)
GLUCOSE UR QL: ABNORMAL
HCT VFR BLD CALC: 34.5 % — LOW (ref 39–50)
HGB BLD-MCNC: 10.7 G/DL — LOW (ref 13–17)
IMM GRANULOCYTES NFR BLD AUTO: 0.7 % — SIGNIFICANT CHANGE UP (ref 0–1.5)
KETONES UR-MCNC: NEGATIVE — SIGNIFICANT CHANGE UP
LEUKOCYTE ESTERASE UR-ACNC: NEGATIVE — SIGNIFICANT CHANGE UP
LYMPHOCYTES # BLD AUTO: 0.59 K/UL — LOW (ref 1–3.3)
LYMPHOCYTES # BLD AUTO: 3.3 % — LOW (ref 13–44)
MAGNESIUM SERPL-MCNC: 2.2 MG/DL — SIGNIFICANT CHANGE UP (ref 1.6–2.6)
MCHC RBC-ENTMCNC: 31 GM/DL — LOW (ref 32–36)
MCHC RBC-ENTMCNC: 31.2 PG — SIGNIFICANT CHANGE UP (ref 27–34)
MCV RBC AUTO: 100.6 FL — HIGH (ref 80–100)
MONOCYTES # BLD AUTO: 0.71 K/UL — SIGNIFICANT CHANGE UP (ref 0–0.9)
MONOCYTES NFR BLD AUTO: 4 % — SIGNIFICANT CHANGE UP (ref 2–14)
NEUTROPHILS # BLD AUTO: 16.39 K/UL — HIGH (ref 1.8–7.4)
NEUTROPHILS NFR BLD AUTO: 91.8 % — HIGH (ref 43–77)
NITRITE UR-MCNC: NEGATIVE — SIGNIFICANT CHANGE UP
NRBC # BLD: 0 /100 WBCS — SIGNIFICANT CHANGE UP (ref 0–0)
PH UR: 6 — SIGNIFICANT CHANGE UP (ref 5–8)
PHOSPHATE SERPL-MCNC: 3 MG/DL — SIGNIFICANT CHANGE UP (ref 2.5–4.5)
PLATELET # BLD AUTO: 203 K/UL — SIGNIFICANT CHANGE UP (ref 150–400)
POTASSIUM SERPL-MCNC: 4 MMOL/L — SIGNIFICANT CHANGE UP (ref 3.5–5.3)
POTASSIUM SERPL-SCNC: 4 MMOL/L — SIGNIFICANT CHANGE UP (ref 3.5–5.3)
PROT UR-MCNC: ABNORMAL
RBC # BLD: 3.43 M/UL — LOW (ref 4.2–5.8)
RBC # FLD: 13.7 % — SIGNIFICANT CHANGE UP (ref 10.3–14.5)
SODIUM SERPL-SCNC: 147 MMOL/L — HIGH (ref 135–145)
SP GR SPEC: 1.02 — SIGNIFICANT CHANGE UP (ref 1.01–1.02)
UROBILINOGEN FLD QL: SIGNIFICANT CHANGE UP
VANCOMYCIN FLD-MCNC: 7.1 UG/ML — SIGNIFICANT CHANGE UP
WBC # BLD: 17.84 K/UL — HIGH (ref 3.8–10.5)
WBC # FLD AUTO: 17.84 K/UL — HIGH (ref 3.8–10.5)

## 2020-04-08 RX ORDER — SODIUM CHLORIDE 9 MG/ML
1000 INJECTION, SOLUTION INTRAVENOUS
Refills: 0 | Status: DISCONTINUED | OUTPATIENT
Start: 2020-04-08 | End: 2020-04-11

## 2020-04-08 RX ORDER — INSULIN LISPRO 100/ML
VIAL (ML) SUBCUTANEOUS
Refills: 0 | Status: DISCONTINUED | OUTPATIENT
Start: 2020-04-08 | End: 2020-04-13

## 2020-04-08 RX ORDER — INSULIN LISPRO 100/ML
VIAL (ML) SUBCUTANEOUS AT BEDTIME
Refills: 0 | Status: DISCONTINUED | OUTPATIENT
Start: 2020-04-08 | End: 2020-04-13

## 2020-04-08 RX ADMIN — SODIUM CHLORIDE 75 MILLILITER(S): 9 INJECTION, SOLUTION INTRAVENOUS at 22:26

## 2020-04-08 RX ADMIN — HEPARIN SODIUM 5000 UNIT(S): 5000 INJECTION INTRAVENOUS; SUBCUTANEOUS at 20:34

## 2020-04-08 RX ADMIN — HEPARIN SODIUM 5000 UNIT(S): 5000 INJECTION INTRAVENOUS; SUBCUTANEOUS at 14:00

## 2020-04-08 RX ADMIN — Medication 40 MILLIGRAM(S): at 17:24

## 2020-04-08 RX ADMIN — Medication 2: at 17:54

## 2020-04-08 RX ADMIN — HEPARIN SODIUM 5000 UNIT(S): 5000 INJECTION INTRAVENOUS; SUBCUTANEOUS at 05:26

## 2020-04-08 RX ADMIN — PANTOPRAZOLE SODIUM 40 MILLIGRAM(S): 20 TABLET, DELAYED RELEASE ORAL at 14:00

## 2020-04-08 RX ADMIN — Medication 40 MILLIGRAM(S): at 05:26

## 2020-04-08 RX ADMIN — SODIUM CHLORIDE 75 MILLILITER(S): 9 INJECTION, SOLUTION INTRAVENOUS at 12:03

## 2020-04-08 NOTE — SWALLOW BEDSIDE ASSESSMENT ADULT - SWALLOW EVAL: CURRENT DIET
Patient called the office to get in urgently. When I asked the patient for her symptons she continued to tell me that as of now she has started to spit up blood along with her other symptoms, as noted in the referral. I talked with our Nurse here in the office and she suggested the patient come to the ER here at [facility]. When I shared this information with the patient she agreed and said her PCP said the same thing. Patient stated she was going to visit our ER.   
NPO, with non-oral nutrition/hydration/medications.

## 2020-04-08 NOTE — PROGRESS NOTE ADULT - PROBLEM SELECTOR PLAN 7
- s/p extensive d/w son Kalpesh and daughter Irasema about current critical medical condition and conservative plan of care.  - DNR DNI reaffirmed - witnessed by RN Carmelina.  - Should there be signs of distress, comfort measures may be considered.  - Family readily available via phone numbers in [employer].  - Palliative consult placed, recs appreciated: ordered dilaudid prn for air hunger

## 2020-04-08 NOTE — SWALLOW BEDSIDE ASSESSMENT ADULT - SLP PERTINENT HISTORY OF CURRENT PROBLEM
Problem: Physical Therapy Goal  Goal: Physical Therapy Goal  Goals to be met by: 2018     Patient will increase functional independence with mobility by performin. Supine to sit with Moderate Assistance  2. Sit to supine with Moderate Assistance  3. Sit to stand transfer with Max Assistance  4. Bed to chair transfer with Max Assistance using Rolling Walker  5. Lower extremity exercise program x20 reps per handout, with supervision   6. Pt will perform sitting at EOB x 10 minutes with Contact Guard Assistance to improve trunk control         Outcome: Ongoing (interventions implemented as appropriate)  Pt re-evaluated and PT goals set.    Gallito Odell, SPT  2018         86 yo M with history of DM type 2, hyperlipidemia, frailty, esophagitis, bifascicular block BIBEMS from [facility] for hypoxia and history of pneumonia for one week. Per EMS, patent was 76% on RA and hypotensive. [facility] had 3 DOA's found on the same floor as patient and there is concern for COVID19. He has a living will that states he does not want to be treated with life saving measures for any condition that would leave him mentally incapacitated or unable to eat. CXR in ER showed RML/RLL pneumonia - given vanco and azactam, 1L fluid bolus for DIONTE/dehydration, sats high 90s on NRB mask.

## 2020-04-08 NOTE — SWALLOW BEDSIDE ASSESSMENT ADULT - COMMENTS
D/w son Kalpesh and daughter Irasema about current critical medical condition and GOC. Confirm DNR DNI per patient wishes and advance directives. We will try IVF/steroids and antibiotics to see if he improves. High risk for mortality given advanced age, multi-organ disease and frailty. NPO with IVF (D5 1/2 NS at 75cc/hr) given lethargy; aspiration precautions; bedside s/swallow to possibly advance diet. Per ID: Severe sepsis (fever, tachycardia, leukocytosis, hypotension) secondary to COVID 19 infection with viral PNA. As per EMR, pt seen for MBS 10/18/19 with recommendations for dysphagia 3 diet and nectar thickened liquids.
D/w son Kalpesh and daughter Irasema about current critical medical condition and GOC. Confirm DNR DNI per patient wishes and advance directives. We will try IVF/steroids and antibiotics to see if he improves. High risk for mortality given advanced age, multi-organ disease and frailty. NPO with IVF (D5 1/2 NS at 75cc/hr) given lethargy; aspiration precautions; bedside s/swallow to possibly advance diet. Per ID: Severe sepsis (fever, tachycardia, leukocytosis, hypotension) secondary to COVID 19 infection with viral PNA. As per EMR, pt seen for MBS 10/18/19 with recommendations for dysphagia 3 diet and nectar thickened liquids.

## 2020-04-08 NOTE — PROGRESS NOTE ADULT - PROBLEM SELECTOR PLAN 2
2/2 COVID  - diet as above  - IV abx renally dosed. s/p Aztreonam (4/5- 4/6) x 2d  - ID following, appreciate recs  - supportive care for covid, steroids 5d course (4/5-4/9)

## 2020-04-08 NOTE — PROGRESS NOTE ADULT - SUBJECTIVE AND OBJECTIVE BOX
[facility] [place]   MD CLIFTON MARTINEZ MD RUORU WONG, PA    TEL:  OFFICE: [phone]  DR VARGAS CELL: [phone]  GRAZYNA FAN CELL: [phone]  DR. MARTINO CELL: [phone]  DR. LUX CELL: [phone]    FROM 5 PM - 7 AM PLEASE CALL ANSWERING SERVICE: [phone]    RENAL FOLLOW UP NOTE  --------------------------------------------------------------------------------  HPI:      Pt covid 19 + on isolation  PAST HISTORY  --------------------------------------------------------------------------------  No significant changes to PMH, PSH, FHx, SHx, unless otherwise noted    ALLERGIES & MEDICATIONS  --------------------------------------------------------------------------------  Allergies    penicillin (Rash)  quinidine (Unknown)    Intolerances      Standing Inpatient Medications  dextrose 5%. 1000 milliLiter(s) IV Continuous <Continuous>  dextrose 50% Injectable 12.5 Gram(s) IV Push once  dextrose 50% Injectable 25 Gram(s) IV Push once  dextrose 50% Injectable 25 Gram(s) IV Push once  heparin  Injectable 5000 Unit(s) SubCutaneous every 8 hours  insulin lispro (HumaLOG) corrective regimen sliding scale   SubCutaneous every 6 hours  lactated ringers. 1000 milliLiter(s) IV Continuous <Continuous>  methylPREDNISolone sodium succinate Injectable 40 milliGRAM(s) IV Push two times a day  pantoprazole  Injectable 40 milliGRAM(s) IV Push daily    PRN Inpatient Medications  dextrose 40% Gel 15 Gram(s) Oral once PRN  glucagon  Injectable 1 milliGRAM(s) IntraMuscular once PRN  HYDROmorphone  Injectable 0.2 milliGRAM(s) IV Push every 3 hours PRN      REVIEW OF SYSTEMS  --------------------------------------------------------------------------------  General: no fever  CVS: no chest pain  RESP: no sob, no cough  ABD: no abdominal pain  : no dysuria,  MSK: no edema     VITALS/PHYSICAL EXAM  --------------------------------------------------------------------------------  T(C): 36.4 (04-08-20 @ 05:06), Max: 36.9 (04-07-20 @ 21:42)  HR: 96 (04-08-20 @ 05:06) (88 - 109)  BP: 150/83 (04-08-20 @ 05:06) (116/74 - 150/83)  RR: 20 (04-08-20 @ 05:06) (20 - 20)  SpO2: 96% (04-08-20 @ 05:06) (90% - 96%)  Wt(kg): --        04-07-20 @ 07:01  -  04-08-20 @ 07:00  --------------------------------------------------------  IN: 1845 mL / OUT: 650 mL / NET: 1195 mL      Pt not examined sec to covid 19 +  	    LABS/STUDIES  --------------------------------------------------------------------------------              10.7   17.84 >-----------<  203      [04-08-20 @ 07:13]              34.5     147  |  109  |  86  ----------------------------<  266      [04-08-20 @ 07:13]  4.0   |  23  |  2.97        Ca     8.8     [04-08-20 @ 07:13]      Mg     2.2     [04-08-20 @ 07:13]      Phos  3.0     [04-08-20 @ 07:13]            Creatinine Trend:  SCr 2.97 [04-08 @ 07:13]  SCr 3.04 [04-07 @ 07:26]  SCr 2.93 [04-06 @ 07:25]  SCr 2.88 [04-05 @ 14:18]    Urinalysis - [04-07-20 @ 23:10]      Color Yellow / Appearance Clear / SG 1.019 / pH 6.0      Gluc 100 mg/dL / Ketone Negative  / Bili Negative / Urobili <2 mg/dL       Blood Small / Protein 30 mg/dL / Leuk Est Negative / Nitrite Negative      RBC 4 / WBC 5 / Hyaline 0 / Gran  / Sq Epi  / Non Sq Epi 1 / Bacteria Negative    Urine Creatinine 67      [04-07-20 @ 20:15]  Urine Protein 52      [04-07-20 @ 20:15]  Urine Sodium <35      [04-07-20 @ 20:15]    Iron 28, TIBC 129, %sat 22      [10-16-19 @ 09:48]  Ferritin 7776      [04-07-20 @ 08:49]  HbA1c 5.6      [04-06-20 @ 09:54]  TSH 2.08      [12-17-19 @ 10:20]

## 2020-04-08 NOTE — SWALLOW BEDSIDE ASSESSMENT ADULT - SWALLOW EVAL: DIAGNOSIS
Patient being followed by this service for potential swallow evaluation. Upon SLP arrival, pt with O2 sat 85-90 on NC. MD Madrid and RN notified. Given same, pt is not a candidate for swallow evaluation at this time as it appears pt's respiratory status does not support safe PO intake. Additionally, NGT placement is high risk for spreading the virus, thus pt likely not a candidate for temporary enteral feeding at this time. If family wishes to pursue comfort measures including PO feeding with the risk of aspiration, the most conservative PO diet would be dysphagia 1 with honey thickened liquids and if pt continues to present with poor respiratory status would suggest RN hold PO feeds. Team to re-consult this service if needed. Patient being followed by this service for potential swallow evaluation. Upon SLP arrival, pt with O2 sat 85-90 on NC. MD Madrid and RN notified. Given same, pt is not a candidate for swallow evaluation at this time as it appears pt's respiratory status does not support safe PO intake. Therefore, recommend NPO, with non oral means of nutrition/hydrationoi. Additionally, NGT placement is high risk for spreading the virus, thus pt likely not a candidate for temporary enteral feeding at this time. If family wishes to pursue comfort measures including PO feeding with the risk of aspiration, the most conservative PO diet would be dysphagia 1 with honey thickened liquids and if pt continues to present with poor respiratory status would suggest RN hold PO feeds. Team to re-consult this service if needed. Patient being followed by this service for potential swallow evaluation. Upon SLP arrival, pt with O2 sat 85-90 on NC. MD Madrid and RN notified. Given same, pt is not a candidate for swallow evaluation at this time as it appears pt's respiratory status does not support safe PO intake. Therefore, recommend NPO, with non oral means of nutrition/hydration. Additionally, NGT placement is high risk for spreading the virus, thus pt likely not a candidate for temporary enteral feeding at this time. If family wishes to pursue comfort measures including PO feeding with the risk of aspiration, the most conservative PO diet would be dysphagia 1 with honey thickened liquids and if pt continues to present with poor respiratory status would suggest RN hold PO feeds. Team to re-consult this service if needed.

## 2020-04-08 NOTE — PROGRESS NOTE ADULT - SUBJECTIVE AND OBJECTIVE BOX
Velma Madrid MD, S  Internal Medicine PGY1  [facility] Pager: [phone] | NS: [phone]  After 7pm, please call NF    Patient is a 87y old  Male who presents with a chief complaint of fever and hypoxia (2020 17:42)    Subjective: No acute events overnight. Patient seen and examined, c/o thirst and dry mouth, requesting water and mild throat pain. Communicates in full sentences today, but still unable to fully open mouth.    MEDICATIONS  (STANDING):  dextrose 5%. 1000 milliLiter(s) (50 mL/Hr) IV Continuous <Continuous>  dextrose 50% Injectable 12.5 Gram(s) IV Push once  dextrose 50% Injectable 25 Gram(s) IV Push once  dextrose 50% Injectable 25 Gram(s) IV Push once  heparin  Injectable 5000 Unit(s) SubCutaneous every 8 hours  insulin lispro (HumaLOG) corrective regimen sliding scale   SubCutaneous every 6 hours  lactated ringers. 1000 milliLiter(s) (75 mL/Hr) IV Continuous <Continuous>  methylPREDNISolone sodium succinate Injectable 40 milliGRAM(s) IV Push two times a day  pantoprazole  Injectable 40 milliGRAM(s) IV Push daily    MEDICATIONS  (PRN):  dextrose 40% Gel 15 Gram(s) Oral once PRN Blood Glucose LESS THAN 70 milliGRAM(s)/deciliter  glucagon  Injectable 1 milliGRAM(s) IntraMuscular once PRN Glucose LESS THAN 70 milligrams/deciliter  HYDROmorphone  Injectable 0.2 milliGRAM(s) IV Push every 3 hours PRN dyspnea    Allergies    penicillin (Rash)  quinidine (Unknown)    Intolerances        VITAL SIGNS:  ICU Vital Signs Last 24 Hrs  T(C): 36.4 (2020 05:06), Max: 36.9 (2020 21:42)  T(F): 97.6 (2020 05:06), Max: 98.4 (2020 21:42)  HR: 96 (2020 05:06) (88 - 109)  BP: 150/83 (2020 05:06) (116/74 - 150/83)  BP(mean): --  ABP: --  ABP(mean): --  RR: 20 (2020 05:06) (20 - 20)  SpO2: 96% (2020 05:06) (90% - 96%)      PHYSICAL EXAM:  GENERAL: NAD, Alert and awake, thin, cachectic elderly male  EYES: EOMI, conjunctiva and sclera clear  LUNGS: Clear to auscultation bilaterally; No wheezing  HEART: Regular rate and rhythm; Normal S1 and S2; No murmurs  ABDOMEN: Soft, Nontender, Nondistended; Bowel sounds present  EXTREMITIES: No LE swelling, 2+ peripheral pulses  NEURO: A&O and more communicative today    LABS:                        10.7   17.84 )-----------( 203      ( 2020 07:13 )             34.5                         11.7   15.86 )-----------( 200      ( 2020 07:26 )             38.7                         10.8   12.78 )-----------( 175      ( 2020 07:25 )             35.8     PT/INR - ( 2020 09:54 )   PT: 18.8 sec;   INR: 1.62 ratio         PTT - ( 2020 09:54 )  PTT:34.6 sec          147<H>  |  109<H>  |  86<H>  ----------------------------<  266<H>  4.0   |  23  |  2.97<H>      143  |  105  |  80<H>  ----------------------------<  264<H>  3.9   |  24  |  3.04<H>      142  |  105  |  72<H>  ----------------------------<  220<H>  4.1   |  21<L>  |  2.93<H>    Ca    8.8      2020 07:13  Ca    9.3      2020 07:26  Ca    9.0      2020 07:25  Phos  3.0     04-08  Mg     2.2     04-08    TPro  6.4  /  Alb  2.6<L>  /  TBili  0.5  /  DBili  x   /  AST  18  /  ALT  16  /  AlkPhos  63  04-06  TPro  8.0  /  Alb  3.4  /  TBili  1.0  /  DBili  x   /  AST  25  /  ALT  23  /  AlkPhos  75  04-05    Urinalysis Basic - ( 2020 23:10 )    Color: Yellow / Appearance: Clear / S.019 / pH: x  Gluc: x / Ketone: Negative  / Bili: Negative / Urobili: <2 mg/dL   Blood: x / Protein: 30 mg/dL / Nitrite: Negative   Leuk Esterase: Negative / RBC: 4 /HPF / WBC 5 /HPF   Sq Epi: x / Non Sq Epi: 1 /HPF / Bacteria: Negative        CAPILLARY BLOOD GLUCOSE      POCT Blood Glucose.: 246 mg/dL (2020 06:22)  POCT Blood Glucose.: 253 mg/dL (2020 23:43)  POCT Blood Glucose.: 231 mg/dL (2020 17:02)  POCT Blood Glucose.: 259 mg/dL (2020 12:35)            IMAGING: Radiology personally reviewed  Consultants involved in case:   Consultant(s) Notes Reviewed:  [ ] YES  [ ] NO:   Care Discussed with Consultants/Other Providers [ ] YES  [ ] NO

## 2020-04-08 NOTE — PROGRESS NOTE ADULT - ATTENDING COMMENTS
Chart reviewed. Vitals and Labs noted.   Pt seen and examined at bedside. Plan formulated with the resident. Detail H&P as above.     overall improving mental status.   breathing stable, remain on NRB. slow speech. comfortable.     COVID19 viral PNA    acute hypoxic respiratory PNA  Acute kidney failure on gentle IVF, likely pre-renal    Plan: c/w hydroxychloriquine, IV Solumedrol 3-5 days course  Blood cultures negative. ID eval appreciated. will monitor off abx.   wean O2 as tolerates, currently on NRB.   c/w gentle IVF, trend Cr. still elevated. monitor I/O, have brown.   DNR/DNI  DVT ppx    Rodriguez Sheets will be covering for me starting 4/8/20. He can be reached at  if needed.     - Dr. SID Steele ([facility])  - [phone]
Chart reviewed. Vitals and Labs noted.   Pt seen at bedside.    overall improving mental status.  Reports still feeling thirsty.  breathing stable, remain on NRB. No signs of air hunger this morning    COVID19 viral PNA    acute hypoxic respiratory failure 2' PNA  Acute kidney failure on gentle IVF, likely pre-renal    Plan: IV Solumedrol last day is 4/10/20  Blood cultures negative. ID eval appreciated. monitoring off abx. (he did get 2 days of aztreonam initially)  wean O2 as tolerates, currently on NRB.   Speech + swallow eval to recommence once he is off NRB  c/w gentle IVF, trend Cr. still elevated. monitor I/O, have brown.   DNR/DNI  DVT ppx  Palliative consult appreciated  Appreciate PGY-1!
Chart reviewed. Vitals and Labs noted.   Pt seen and examined at bedside. Plan formulated with the resident/PA/NP. Detail H&P as above.     remain on NRB, labile respiratory status. slow speech. knows that he is in the hospital  poor historian otherwise, comfortable.     COVID19 viral PNA +/- bacterial component  acute hypoxic respiratory PNA  Acute kidney failure on gentle IVF    Plan: c/w hydroxychloriquine, IV Solumedrol.  Blood cultures negative. ID eval appreciated. will monitor off abx.   wean O2 as tolerates  gentle IVF, trend Cr  DNR/DNI, MOLST signed   DVT ppx    - Dr. LAU Htet ([facility])  - [phone]

## 2020-04-08 NOTE — PROGRESS NOTE ADULT - PROBLEM SELECTOR PLAN 1
Likely in setting of COVID  - c/w oxygen supplementation via NC (5L on 4/8)  - c/w iv steroids: methylprednisolone 40mg BID given low weight 4/5-4/9  - c/w IVF (LR at 75cc/hr) given lethargy, awaiting bedside swallow eval before advancing diet today  - aspiration precautions  - s/swallow eval ordered  - pulse ox monitoring

## 2020-04-08 NOTE — PROGRESS NOTE ADULT - PROBLEM SELECTOR PLAN 4
likely related to acute infection and respiratory failure, now communicative  - avoid sedatives/hypnotics  - supportive care for COVID as above

## 2020-04-08 NOTE — SWALLOW BEDSIDE ASSESSMENT ADULT - DIET PRIOR TO ADMI
Per MD Madrid, Family reported Pt was upgraded to regular texture diet at [facility] on 10/19.

## 2020-04-08 NOTE — PROGRESS NOTE ADULT - PROBLEM SELECTOR PLAN 3
P/W leukocytosis (15), elevated ESR (120), CRP (29) and Ferritin (5271), with tropenemia (94) in setting of COVID+ sepsis, possible pna (or both)   - iv abx: on aztreonam 1g q8h given allergy to PCN  - Positive COVID (4/5)   - f/u blood cultures 4/5 ngtd   - c/w IVF  - monitor Is/Os

## 2020-04-08 NOTE — PROGRESS NOTE ADULT - PROBLEM SELECTOR PLAN 5
likely due to dehydration, Cr 2.88 (BL 1.27 from 12/2019). DIONTE vs ATN  - possible ATN  - c/w gentle IVF (75cc/hr), continue given persistent rising Cr, 4/8 mildly improved to 2.97 (from 3.04)  - avoid nephrotoxics. monitor intake and output  - nephrology following, appreciate recs

## 2020-04-08 NOTE — CHART NOTE - NSCHARTNOTEFT_GEN_A_CORE
Nutrition Initial Assessment    Nutrition Consult Received: Yes [   ]  No [x]    Reason for Initial Nutrition Assessment: LOS, BMI<18     Source of Information: Unable to conduct a fact to face interview due to limited contact restrictions related to pt's medical condition and isolation precautions. Unable to reach pt over phone at this time, multiple attempts made. Spoke c emergency contact, Kalpesh Hopkins at [phone], sister Irasema joined in on call as well.     Admitting Diagnosis: 87y Male admitted for Respiratory failure, pt COVID-19 positive, noted pt pending bedside swallow evaluation as able once pt is off NRB mask. Noted pt c DIONTE/dehydration/ATN per team.     PAST MEDICAL & SURGICAL HISTORY:  Squamous cell carcinoma  Basal cell carcinoma of skin  Pancreatitis: years ago  Diabetes mellitus: type 2  Hyperlipidemia  Anxiety  Depression  H/O Moh's micrographic surgery for skin cancer: 4/30/18 right cheek  Renal cyst surgery in 2008  Inguinal hernia s/p repair (L)      Subjective Information:   As per family, pt c good appetite and intake except for about 1 week PTA due to not feeling well. Family reports pt usually consumes 3 meals daily, more so at breakfast and was taking Ensure shakes, at times one to two daily. Family reports pt c NKFA. As per chart and family, pt was taking multivitamin and iron supplements PTA. Family reports no chewing/swallowing issues PTA. Noted A1C of 5.6%, indicating very good glycemic control at this time.     Report pt c wt loss last year due to having thrush and since then he was able to put some of the wt back on and was up to 130 pounds, report pt may have lost some wt during the last week he wasn't eating well but unsure of how much wt. Noted as per previous RD note in December 2019, pt was down to about 116 pounds and in October 2019 wt was 133 pounds. Noted dosing wt of 100 pounds, questionable accuracy of wt at this time. As per Wellness Department at [facility], pt weighed about 131 pounds in March 2020.     Noted pt was on dysphagia diet during admissions in late 2019 and declined need for those restrictions. At this time per family and [facility], pt was doing well on regular diet.     GI Issues: no BM in house at this time     Current Nutrition Order: Diet, NPO:   Except Medications (04-07-20 @ 14:42)    PO Intake:   pt NPO since admission, was on a PO diet of dysphagia 3 c honey consistency liquids, consistent CHO c snack diet for a few hours yesterday     Skin Integrity: stage I pressure injury on sacrum   Edema: none at this time     Labs:   04-08 Na147 mmol/L<H> Glu 266 mg/dL<H> K+ 4.0 mmol/L Cr  2.97 mg/dL<H> BUN 86 mg/dL<H> Phos 3.0 mg/dL Alb n/a   PAB n/a         Hemoglobin A1C, Whole Blood: 5.6 % (04-06-20 @ 09:54)    POCT Blood Glucose.: 246 mg/dL (04-08-20 @ 06:22)  POCT Blood Glucose.: 253 mg/dL (04-07-20 @ 23:43)  POCT Blood Glucose.: 231 mg/dL (04-07-20 @ 17:02)  POCT Blood Glucose.: 259 mg/dL (04-07-20 @ 12:35)    Medications:  MEDICATIONS  (STANDING):  dextrose 5%. 1000 milliLiter(s) (50 mL/Hr) IV Continuous <Continuous>  dextrose 50% Injectable 12.5 Gram(s) IV Push once  dextrose 50% Injectable 25 Gram(s) IV Push once  dextrose 50% Injectable 25 Gram(s) IV Push once  heparin  Injectable 5000 Unit(s) SubCutaneous every 8 hours  insulin lispro (HumaLOG) corrective regimen sliding scale   SubCutaneous every 6 hours  lactated ringers. 1000 milliLiter(s) (75 mL/Hr) IV Continuous <Continuous>  methylPREDNISolone sodium succinate Injectable 40 milliGRAM(s) IV Push two times a day  pantoprazole  Injectable 40 milliGRAM(s) IV Push daily    MEDICATIONS  (PRN):  dextrose 40% Gel 15 Gram(s) Oral once PRN Blood Glucose LESS THAN 70 milliGRAM(s)/deciliter  glucagon  Injectable 1 milliGRAM(s) IntraMuscular once PRN Glucose LESS THAN 70 milligrams/deciliter  HYDROmorphone  Injectable 0.2 milliGRAM(s) IV Push every 3 hours PRN dyspnea    Admitted Anthropometrics:    Height (cm): 172.72 (04-05-20 @ 13:00)  Weight (kg): 45.4 (04-05-20 @ 13:00)  BMI (kg/m2): 15.2 (04-05-20 @ 13:00); based on most recent wt of 131 pounds at [facility] of 131 pounds, pt c BMI of 19.9  IBW +/- 10%: 154 pounds       Nutrition Focused Physical Exam: Unable to complete due to limited isolation contact precautions at this time.     Estimated Energy Needs ( 20cal/kg- 25 long/kg): 1190-1488cal  Estimated Protein Needs ( 1.2Gm/kg-1.4  Gm/kg): 71-83 Gm   Based on weight of: 59.5 kg (131 pounds as per most recent wt at [facility])    [x] Nutrition Diagnosis: inadequate protein-energy intake related to current medical condition, decreased appetite as evidenced by reports of suboptimal intake, possible wt loss, skin starting to breakdown       Goal: Patient will be able to meet at least 75% of estimated nutrient needs     Nutrition Interventions: initiation of PO diet as medically feasible    Recommendations:  1. If/when PO diet initiated, would defer consistency to team and Speech Pathologist recommendations. Would not restrict diet therapeutically given pt c good glycemic control PTA.  2. If/when PO diet initiated, consider oral nutritional supplementation with Ensure Enlive x 2 daily (as per pt preference).  3. RD remains available for further nutritional interventions as needed.     RD to follow-up per protocol.  Michelle Darden MS RD CDN [facility] #[phone] Nutrition Initial Assessment    Nutrition Consult Received: Yes [   ]  No [x]    Reason for Initial Nutrition Assessment: LOS, BMI<18     Source of Information: Unable to conduct a fact to face interview due to limited contact restrictions related to pt's medical condition and isolation precautions. Unable to reach pt over phone at this time, multiple attempts made. Noted per team notes, pt minimally verbal, unable to fully open mouth at this time. Spoke c emergency contact, Kalpesh Hopkins at [phone], sister Irasema joined in on call as well.     Admitting Diagnosis: 87y Male admitted for Respiratory failure, pt COVID-19 positive, noted pt pending bedside swallow evaluation as able once pt is off NRB mask. Noted pt c DIONTE/dehydration/ATN per team.     PAST MEDICAL & SURGICAL HISTORY:  Squamous cell carcinoma  Basal cell carcinoma of skin  Pancreatitis: years ago  Diabetes mellitus: type 2  Hyperlipidemia  Anxiety  Depression  H/O Moh's micrographic surgery for skin cancer: 4/30/18 right cheek  Renal cyst surgery in 2008  Inguinal hernia s/p repair (L)      Subjective Information:   As per family, pt c good appetite and intake except for about 1 week PTA due to not feeling well. Family reports pt usually consumes 3 meals daily, more so at breakfast and was taking Ensure shakes, at times one to two daily. Family reports pt c NKFA. As per chart and family, pt was taking multivitamin and iron supplements PTA. Family reports no chewing/swallowing issues PTA. Noted A1C of 5.6%, indicating very good glycemic control at this time. Family reports pt was on Marinol PTA and it was working well.     Report pt c wt loss last year due to having thrush and since then he was able to put some of the wt back on and was up to 130 pounds, report pt may have lost some wt during the last week he wasn't eating well but unsure of how much wt. Noted as per previous RD note in December 2019, pt was down to about 116 pounds and in October 2019 wt was 133 pounds. Noted dosing wt of 100 pounds, questionable accuracy of wt at this time. As per Wellness Department at [facility], pt weighed about 131 pounds in March 2020.     Noted pt was on dysphagia diet during admissions in late 2019 and declined need for those restrictions. At this time per family and [facility], pt was doing well on regular diet.     GI Issues: no BM in house at this time     Current Nutrition Order: Diet, NPO:   Except Medications (04-07-20 @ 14:42)    PO Intake:   pt NPO since admission, was on a PO diet of dysphagia 3 c honey consistency liquids, consistent CHO c snack diet for a few hours yesterday     Skin Integrity: stage I pressure injury on sacrum   Edema: none at this time     Labs:   04-08 Na147 mmol/L<H> Glu 266 mg/dL<H> K+ 4.0 mmol/L Cr  2.97 mg/dL<H> BUN 86 mg/dL<H> Phos 3.0 mg/dL Alb n/a   PAB n/a         Hemoglobin A1C, Whole Blood: 5.6 % (04-06-20 @ 09:54)    POCT Blood Glucose.: 246 mg/dL (04-08-20 @ 06:22)  POCT Blood Glucose.: 253 mg/dL (04-07-20 @ 23:43)  POCT Blood Glucose.: 231 mg/dL (04-07-20 @ 17:02)  POCT Blood Glucose.: 259 mg/dL (04-07-20 @ 12:35)    Medications:  MEDICATIONS  (STANDING):  dextrose 5%. 1000 milliLiter(s) (50 mL/Hr) IV Continuous <Continuous>  dextrose 50% Injectable 12.5 Gram(s) IV Push once  dextrose 50% Injectable 25 Gram(s) IV Push once  dextrose 50% Injectable 25 Gram(s) IV Push once  heparin  Injectable 5000 Unit(s) SubCutaneous every 8 hours  insulin lispro (HumaLOG) corrective regimen sliding scale   SubCutaneous every 6 hours  lactated ringers. 1000 milliLiter(s) (75 mL/Hr) IV Continuous <Continuous>  methylPREDNISolone sodium succinate Injectable 40 milliGRAM(s) IV Push two times a day  pantoprazole  Injectable 40 milliGRAM(s) IV Push daily    MEDICATIONS  (PRN):  dextrose 40% Gel 15 Gram(s) Oral once PRN Blood Glucose LESS THAN 70 milliGRAM(s)/deciliter  glucagon  Injectable 1 milliGRAM(s) IntraMuscular once PRN Glucose LESS THAN 70 milligrams/deciliter  HYDROmorphone  Injectable 0.2 milliGRAM(s) IV Push every 3 hours PRN dyspnea    Admitted Anthropometrics:    Height (cm): 172.72 (04-05-20 @ 13:00)  Weight (kg): 45.4 (04-05-20 @ 13:00)  BMI (kg/m2): 15.2 (04-05-20 @ 13:00); based on most recent wt of 131 pounds at [facility] of 131 pounds, pt c BMI of 19.9  IBW +/- 10%: 154 pounds       Nutrition Focused Physical Exam: Unable to complete due to limited isolation contact precautions at this time.     Estimated Energy Needs ( 20cal/kg- 25 long/kg): 1190-1488cal  Estimated Protein Needs ( 1.2Gm/kg-1.4  Gm/kg): 71-83 Gm   Based on weight of: 59.5 kg (131 pounds as per most recent wt at [facility])    [x] Nutrition Diagnosis: inadequate protein-energy intake related to current medical condition, decreased appetite as evidenced by reports of suboptimal intake, possible wt loss, skin starting to breakdown       Goal: Patient will be able to meet at least 75% of estimated nutrient needs     Nutrition Interventions: initiation of PO diet as medically feasible    Recommendations:  1. If/when PO diet initiated, would defer consistency to team and Speech Pathologist recommendations. Would not restrict diet therapeutically given pt c good glycemic control PTA.  2. If/when PO diet initiated, consider oral nutritional supplementation with Ensure Enlive x 2 daily (as per pt preference).  3. RD remains available for further nutritional interventions as needed.   4. Consider resuming Marinol as medically feasible.     RD to follow-up per protocol.  Michelle Darden MS RD CDN [facility] #[phone] Nutrition Initial Assessment    Nutrition Consult Received: Yes [   ]  No [x]    Reason for Initial Nutrition Assessment: LOS, BMI<18     Source of Information: Unable to conduct a fact to face interview due to limited contact restrictions related to pt's medical condition and isolation precautions. Unable to reach pt over phone at this time, multiple attempts made. Noted per team notes, pt minimally verbal, unable to fully open mouth at this time. Spoke c emergency contact, Kalpesh Hopkins at [phone], sister Irasema joined in on call as well.     Admitting Diagnosis: 87y Male admitted for Respiratory failure, pt COVID-19 positive, noted pt pending bedside swallow evaluation as able once pt is off NRB mask. Noted pt c DIONTE/dehydration/ATN per team.     PAST MEDICAL & SURGICAL HISTORY:  Squamous cell carcinoma  Basal cell carcinoma of skin  Pancreatitis: years ago  Diabetes mellitus: type 2  Hyperlipidemia  Anxiety  Depression  H/O Moh's micrographic surgery for skin cancer: 4/30/18 right cheek  Renal cyst surgery in 2008  Inguinal hernia s/p repair (L)      Subjective Information:   As per family, pt c good appetite and intake except for about 1 week PTA due to not feeling well. Family reports pt usually consumes 3 meals daily, more so at breakfast and was taking Ensure shakes, at times one to two daily. Family reports pt c NKFA. As per chart and family, pt was taking multivitamin and iron supplements PTA. Family reports no chewing/swallowing issues PTA. Noted A1C of 5.6%, indicating very good glycemic control at this time. Family reports pt was on Marinol PTA and it was working well.     Report pt c wt loss last year due to having thrush and since then he was able to put some of the wt back on and was up to 130 pounds, report pt may have lost some wt during the last week he wasn't eating well but unsure of how much wt. Noted as per previous RD note in December 2019, pt was down to about 116 pounds and in October 2019 wt was 133 pounds. Noted dosing wt of 100 pounds, questionable accuracy of wt at this time. As per Wellness Department at [facility], pt weighed about 131 pounds in March 2020.     Noted pt was on dysphagia diet during admissions in late 2019 and declined need for those restrictions. At this time per family and [facility], pt was doing well on regular diet.     GI Issues: no BM in house at this time     Current Nutrition Order: Diet, NPO:   Except Medications (04-07-20 @ 14:42)    PO Intake:   pt NPO since admission, was on a PO diet of dysphagia 3 c honey consistency liquids, consistent CHO c snack diet for a few hours yesterday     Skin Integrity: stage I pressure injury on sacrum   Edema: none at this time     Labs:   04-08 Na147 mmol/L<H> Glu 266 mg/dL<H> K+ 4.0 mmol/L Cr  2.97 mg/dL<H> BUN 86 mg/dL<H> Phos 3.0 mg/dL Alb n/a   PAB n/a         Hemoglobin A1C, Whole Blood: 5.6 % (04-06-20 @ 09:54)    POCT Blood Glucose.: 246 mg/dL (04-08-20 @ 06:22)  POCT Blood Glucose.: 253 mg/dL (04-07-20 @ 23:43)  POCT Blood Glucose.: 231 mg/dL (04-07-20 @ 17:02)  POCT Blood Glucose.: 259 mg/dL (04-07-20 @ 12:35)    Medications:  MEDICATIONS  (STANDING):  dextrose 5%. 1000 milliLiter(s) (50 mL/Hr) IV Continuous <Continuous>  dextrose 50% Injectable 12.5 Gram(s) IV Push once  dextrose 50% Injectable 25 Gram(s) IV Push once  dextrose 50% Injectable 25 Gram(s) IV Push once  heparin  Injectable 5000 Unit(s) SubCutaneous every 8 hours  insulin lispro (HumaLOG) corrective regimen sliding scale   SubCutaneous every 6 hours  lactated ringers. 1000 milliLiter(s) (75 mL/Hr) IV Continuous <Continuous>  methylPREDNISolone sodium succinate Injectable 40 milliGRAM(s) IV Push two times a day  pantoprazole  Injectable 40 milliGRAM(s) IV Push daily    MEDICATIONS  (PRN):  dextrose 40% Gel 15 Gram(s) Oral once PRN Blood Glucose LESS THAN 70 milliGRAM(s)/deciliter  glucagon  Injectable 1 milliGRAM(s) IntraMuscular once PRN Glucose LESS THAN 70 milligrams/deciliter  HYDROmorphone  Injectable 0.2 milliGRAM(s) IV Push every 3 hours PRN dyspnea    Admitted Anthropometrics:    Height (cm): 172.72 (04-05-20 @ 13:00)  Weight (kg): 45.4 (04-05-20 @ 13:00)  BMI (kg/m2): 15.2 (04-05-20 @ 13:00); based on most recent wt of 131 pounds at [facility] of 131 pounds, pt c BMI of 19.9  IBW +/- 10%: 154 pounds       Nutrition Focused Physical Exam: Unable to complete due to limited isolation contact precautions at this time.     Estimated Energy Needs ( 20cal/kg- 25 long/kg): 1190-1488cal  Estimated Protein Needs ( 1.2Gm/kg-1.4  Gm/kg): 71-83 Gm   Based on weight of: 59.5 kg (131 pounds as [facility]    [x] Nutrition Diagnosis: inadequate protein-energy intake related to current medical condition, decreased appetite as evidenced by reports of suboptimal intake, possible wt loss, skin starting to breakdown       Goal: Patient will be able to meet at least 75% of estimated nutrient needs     Nutrition Interventions: initiation of PO diet as medically feasible    Recommendations:  1. If/when PO diet initiated, would defer consistency to team and Speech Pathologist recommendations. Would not restrict diet therapeutically given pt c good glycemic control PTA.  2. If/when PO diet initiated, consider oral nutritional supplementation with Ensure Enlive x 2 daily (as per pt preference).  3. RD remains available for further nutritional interventions as needed.   4. Consider resuming Marinol as medically feasible.   5. Obtained food preferences from family to help provide foods pt will like to promote PO intake once on PO diet.     RD to follow-up per protocol.  Michelle Darden MS RD CDN [facility] #[phone] Nutrition Initial Assessment    Nutrition Consult Received: Yes [   ]  No [x]    Reason for Initial Nutrition Assessment: LOS, BMI<18     Source of Information: Unable to conduct a fact to face interview due to limited contact restrictions related to pt's medical condition and isolation precautions. Unable to reach pt over phone at this time, multiple attempts made. Noted per team notes, pt minimally verbal, unable to fully open mouth at this time. Spoke c emergency contact, Kalpesh Hopkins at [phone], sister Irasema joined in on call as well.     Admitting Diagnosis: 87y Male admitted for Respiratory failure, pt COVID-19 positive, noted pt pending bedside swallow evaluation as able once pt is off NRB mask. Noted pt c DIONTE/dehydration/ATN per team.     PAST MEDICAL & SURGICAL HISTORY:  Squamous cell carcinoma  Basal cell carcinoma of skin  Pancreatitis: years ago  Diabetes mellitus: type 2  Hyperlipidemia  Anxiety  Depression  H/O Moh's micrographic surgery for skin cancer: 4/30/18 right cheek  Renal cyst surgery in 2008  Inguinal hernia s/p repair (L)      Subjective Information:   As per family, pt c good appetite and intake except for about 1 week PTA due to not feeling well. Family reports pt usually consumes 3 meals daily, more so at breakfast and was taking Ensure shakes, at times one to two daily. Family reports pt c NKFA. As per chart and family, pt was taking multivitamin and iron supplements PTA. Family reports no chewing/swallowing issues PTA. Noted A1C of 5.6%, indicating very good glycemic control at this time. Family reports pt was on Marinol PTA and it was working well.     Report pt c wt loss last year due to having thrush and since then he was able to put some of the wt back on and was up to 130 pounds, report pt may have lost some wt during the last week he wasn't eating well but unsure of how much wt. Noted as per previous RD note in December 2019, pt was down to about 116 pounds and in October 2019 wt was 133 pounds. Noted dosing wt of 100 pounds, questionable accuracy of wt at this time. As per Wellness Department at [facility], pt weighed about 131 pounds in March 2020. Noted pt c nutrition diagnosis of malnutrition during previous admissions, noted pt with wt gain PTA and good intake PTA, would continue to monitor nutritional status.     Noted pt was on dysphagia diet during admissions in late 2019 and declined need for those restrictions. At this time per family and [facility], pt was doing well on regular diet.     GI Issues: no BM in house at this time     Current Nutrition Order: Diet, NPO:   Except Medications (04-07-20 @ 14:42)    PO Intake:   pt NPO since admission, was on a PO diet of dysphagia 3 c honey consistency liquids, consistent CHO c snack diet for a few hours yesterday     Skin Integrity: stage I pressure injury on sacrum   Edema: none at this time     Labs:   04-08 Na147 mmol/L<H> Glu 266 mg/dL<H> K+ 4.0 mmol/L Cr  2.97 mg/dL<H> BUN 86 mg/dL<H> Phos 3.0 mg/dL Alb n/a   PAB n/a         Hemoglobin A1C, Whole Blood: 5.6 % (04-06-20 @ 09:54)    POCT Blood Glucose.: 246 mg/dL (04-08-20 @ 06:22)  POCT Blood Glucose.: 253 mg/dL (04-07-20 @ 23:43)  POCT Blood Glucose.: 231 mg/dL (04-07-20 @ 17:02)  POCT Blood Glucose.: 259 mg/dL (04-07-20 @ 12:35)    Medications:  MEDICATIONS  (STANDING):  dextrose 5%. 1000 milliLiter(s) (50 mL/Hr) IV Continuous <Continuous>  dextrose 50% Injectable 12.5 Gram(s) IV Push once  dextrose 50% Injectable 25 Gram(s) IV Push once  dextrose 50% Injectable 25 Gram(s) IV Push once  heparin  Injectable 5000 Unit(s) SubCutaneous every 8 hours  insulin lispro (HumaLOG) corrective regimen sliding scale   SubCutaneous every 6 hours  lactated ringers. 1000 milliLiter(s) (75 mL/Hr) IV Continuous <Continuous>  methylPREDNISolone sodium succinate Injectable 40 milliGRAM(s) IV Push two times a day  pantoprazole  Injectable 40 milliGRAM(s) IV Push daily    MEDICATIONS  (PRN):  dextrose 40% Gel 15 Gram(s) Oral once PRN Blood Glucose LESS THAN 70 milliGRAM(s)/deciliter  glucagon  Injectable 1 milliGRAM(s) IntraMuscular once PRN Glucose LESS THAN 70 milligrams/deciliter  HYDROmorphone  Injectable 0.2 milliGRAM(s) IV Push every 3 hours PRN dyspnea    Admitted Anthropometrics:    Height (cm): 172.72 (04-05-20 @ 13:00)  Weight (kg): 45.4 (04-05-20 @ 13:00)  BMI (kg/m2): 15.2 (04-05-20 @ 13:00); based on most recent wt of 131 pounds at [facility] of 131 pounds, pt c BMI of 19.9  IBW +/- 10%: 154 pounds       Nutrition Focused Physical Exam: Unable to complete due to limited isolation contact precautions at this time.     Estimated Energy Needs ( 20cal/kg- 25 long/kg): 1190-1488cal  Estimated Protein Needs ( 1.2Gm/kg-1.4  Gm/kg): 71-83 Gm   Based on weight of: 59.5 kg (131 pounds as per most recent wt at [facility])    [x] Nutrition Diagnosis: inadequate protein-energy intake related to current medical condition, decreased appetite as evidenced by reports of suboptimal intake, possible wt loss, skin starting to breakdown       Goal: Patient will be able to meet at least 75% of estimated nutrient needs     Nutrition Interventions: initiation of PO diet as medically feasible    Recommendations:  1. If/when PO diet initiated, would defer consistency to team and Speech Pathologist recommendations. Would not restrict diet therapeutically given pt c good glycemic control PTA.  2. If/when PO diet initiated, consider oral nutritional supplementation with Ensure Enlive x 2 daily (as per pt preference).  3. RD remains available for further nutritional interventions as needed.   4. Consider resuming Marinol as medically feasible.   5. Obtained food preferences from family to help provide foods pt will like to promote PO intake once on PO diet.     RD to follow-up per protocol.  Michelle Darden MS RD CDN [facility] #[phone]

## 2020-04-09 LAB
CULTURE RESULTS: SIGNIFICANT CHANGE UP
CULTURE RESULTS: SIGNIFICANT CHANGE UP
GLUCOSE BLDC GLUCOMTR-MCNC: 258 MG/DL — HIGH (ref 70–99)
GLUCOSE BLDC GLUCOMTR-MCNC: 262 MG/DL — HIGH (ref 70–99)
GLUCOSE BLDC GLUCOMTR-MCNC: 286 MG/DL — HIGH (ref 70–99)
SPECIMEN SOURCE: SIGNIFICANT CHANGE UP
SPECIMEN SOURCE: SIGNIFICANT CHANGE UP

## 2020-04-09 RX ADMIN — Medication 3: at 08:43

## 2020-04-09 RX ADMIN — Medication 1: at 14:36

## 2020-04-09 RX ADMIN — PANTOPRAZOLE SODIUM 40 MILLIGRAM(S): 20 TABLET, DELAYED RELEASE ORAL at 11:17

## 2020-04-09 RX ADMIN — Medication 3: at 17:37

## 2020-04-09 RX ADMIN — SODIUM CHLORIDE 75 MILLILITER(S): 9 INJECTION, SOLUTION INTRAVENOUS at 11:18

## 2020-04-09 RX ADMIN — Medication 40 MILLIGRAM(S): at 04:08

## 2020-04-09 RX ADMIN — HEPARIN SODIUM 5000 UNIT(S): 5000 INJECTION INTRAVENOUS; SUBCUTANEOUS at 14:09

## 2020-04-09 RX ADMIN — Medication 1: at 22:37

## 2020-04-09 RX ADMIN — HEPARIN SODIUM 5000 UNIT(S): 5000 INJECTION INTRAVENOUS; SUBCUTANEOUS at 04:08

## 2020-04-09 RX ADMIN — HEPARIN SODIUM 5000 UNIT(S): 5000 INJECTION INTRAVENOUS; SUBCUTANEOUS at 22:37

## 2020-04-09 RX ADMIN — Medication 40 MILLIGRAM(S): at 17:01

## 2020-04-09 NOTE — PROGRESS NOTE ADULT - PROBLEM SELECTOR PLAN 7
- s/p extensive d/w son Kalpesh and daughter Irasema about current critical medical condition and conservative plan of care.  - DNR DNI reaffirmed - witnessed by RN Carmelina.  - Should there be signs of distress, comfort measures may be considered.  - Family readily available via phone numbers in [place].  - Palliative consult placed, recs appreciated: ordered dilaudid prn for air hunger

## 2020-04-09 NOTE — PROGRESS NOTE ADULT - SUBJECTIVE AND OBJECTIVE BOX
[facility] [place] [facility]   MD CLIFTON MARTINEZ MD RUORU WONG, PA    TEL:  OFFICE: [phone]  DR VARGAS CELL: [phone]  GRAZYNA FAN CELL: [phone]  DR. MARTINO CELL: [phone]  DR. LUX CELL: [phone]    FROM 5 PM - 7 AM PLEASE CALL ANSWERING SERVICE: [phone]    RENAL FOLLOW UP NOTE  --------------------------------------------------------------------------------  HPI:      Pt COVID 19 + , on isolation    PAST HISTORY  --------------------------------------------------------------------------------  No significant changes to PMH, PSH, FHx, SHx, unless otherwise noted    ALLERGIES & MEDICATIONS  --------------------------------------------------------------------------------  Allergies    penicillin (Rash)  quinidine (Unknown)    Intolerances      Standing Inpatient Medications  dextrose 5%. 1000 milliLiter(s) IV Continuous <Continuous>  dextrose 50% Injectable 12.5 Gram(s) IV Push once  dextrose 50% Injectable 25 Gram(s) IV Push once  dextrose 50% Injectable 25 Gram(s) IV Push once  heparin  Injectable 5000 Unit(s) SubCutaneous every 8 hours  insulin lispro (HumaLOG) corrective regimen sliding scale   SubCutaneous three times a day before meals  insulin lispro (HumaLOG) corrective regimen sliding scale   SubCutaneous at bedtime  lactated ringers. 1000 milliLiter(s) IV Continuous <Continuous>  methylPREDNISolone sodium succinate Injectable 40 milliGRAM(s) IV Push two times a day  pantoprazole  Injectable 40 milliGRAM(s) IV Push daily    PRN Inpatient Medications  dextrose 40% Gel 15 Gram(s) Oral once PRN  glucagon  Injectable 1 milliGRAM(s) IntraMuscular once PRN  HYDROmorphone  Injectable 0.2 milliGRAM(s) IV Push every 3 hours PRN      REVIEW OF SYSTEMS  --------------------------------------------------------------------------------  General: no fever    MSK: no edema     VITALS/PHYSICAL EXAM  --------------------------------------------------------------------------------  T(C): 36.8 (04-09-20 @ 04:24), Max: 37 (04-08-20 @ 20:20)  HR: 94 (04-09-20 @ 09:16) (93 - 112)  BP: 141/84 (04-09-20 @ 04:24) (130/60 - 141/84)  RR: 18 (04-09-20 @ 04:24) (18 - 20)  SpO2: 96% (04-09-20 @ 04:24) (90% - 96%)  Wt(kg): --        04-08-20 @ 07:01  -  04-09-20 @ 07:00  --------------------------------------------------------  IN: 1350 mL / OUT: 1000 mL / NET: 350 mL        LABS/STUDIES  --------------------------------------------------------------------------------              10.7   17.84 >-----------<  203      [04-08-20 @ 07:13]              34.5     147  |  109  |  86  ----------------------------<  266      [04-08-20 @ 07:13]  4.0   |  23  |  2.97        Ca     8.8     [04-08-20 @ 07:13]      Mg     2.2     [04-08-20 @ 07:13]      Phos  3.0     [04-08-20 @ 07:13]            Creatinine Trend:  SCr 2.97 [04-08 @ 07:13]  SCr 3.04 [04-07 @ 07:26]  SCr 2.93 [04-06 @ 07:25]  SCr 2.88 [04-05 @ 14:18]    Urinalysis - [04-07-20 @ 23:10]      Color Yellow / Appearance Clear / SG 1.019 / pH 6.0      Gluc 100 mg/dL / Ketone Negative  / Bili Negative / Urobili <2 mg/dL       Blood Small / Protein 30 mg/dL / Leuk Est Negative / Nitrite Negative      RBC 4 / WBC 5 / Hyaline 0 / Gran  / Sq Epi  / Non Sq Epi 1 / Bacteria Negative    Urine Creatinine 67      [04-07-20 @ 20:15]  Urine Protein 52      [04-07-20 @ 20:15]  Urine Sodium <35      [04-07-20 @ 20:15]    Iron 28, TIBC 129, %sat 22      [10-16-19 @ 09:48]  Ferritin 7776      [04-07-20 @ 08:49]  HbA1c 5.6      [04-06-20 @ 09:54]  TSH 2.08      [12-17-19 @ 10:20]

## 2020-04-09 NOTE — PROGRESS NOTE ADULT - SUBJECTIVE AND OBJECTIVE BOX
Patient is a 87y old  Male who presents with a chief complaint of fever and hypoxia (2020 17:42)    Subjective: No acute events overnight. Patient seen and examined, c/o thirst and dry mouth, requesting water and mild throat pain. Dysphagia diet initiated.    MEDICATIONS  (STANDING):  dextrose 5%. 1000 milliLiter(s) (50 mL/Hr) IV Continuous <Continuous>  dextrose 50% Injectable 12.5 Gram(s) IV Push once  dextrose 50% Injectable 25 Gram(s) IV Push once  dextrose 50% Injectable 25 Gram(s) IV Push once  heparin  Injectable 5000 Unit(s) SubCutaneous every 8 hours  insulin lispro (HumaLOG) corrective regimen sliding scale   SubCutaneous every 6 hours  lactated ringers. 1000 milliLiter(s) (75 mL/Hr) IV Continuous <Continuous>  methylPREDNISolone sodium succinate Injectable 40 milliGRAM(s) IV Push two times a day  pantoprazole  Injectable 40 milliGRAM(s) IV Push daily    MEDICATIONS  (PRN):  dextrose 40% Gel 15 Gram(s) Oral once PRN Blood Glucose LESS THAN 70 milliGRAM(s)/deciliter  glucagon  Injectable 1 milliGRAM(s) IntraMuscular once PRN Glucose LESS THAN 70 milligrams/deciliter  HYDROmorphone  Injectable 0.2 milliGRAM(s) IV Push every 3 hours PRN dyspnea    Allergies    penicillin (Rash)  quinidine (Unknown)    Intolerances      Vital Signs Last 24 Hrs  T(C): 36.5 (2020 12:00), Max: 36.8 (2020 04:24)  T(F): 97.7 (2020 12:00), Max: 98.3 (2020 04:24)  HR: 100 (2020 12:00) (93 - 100)  BP: 133/78 (2020 12:00) (133/78 - 141/84)  BP(mean): --  RR: 18 (2020 12:00) (18 - 18)  SpO2: 90% (2020 12:00) (90% - 96%)      PHYSICAL EXAM:  GENERAL: NAD, Alert and awake, thin, cachectic elderly male  EYES: EOMI, conjunctiva and sclera clear  LUNGS: Clear to auscultation bilaterally; No wheezing  HEART: Regular rate and rhythm; Normal S1 and S2; No murmurs  ABDOMEN: Soft, Nontender, Nondistended; Bowel sounds present  EXTREMITIES: No LE swelling, 2+ peripheral pulses  NEURO: A&O and more communicative today      LABS:                        10.7   17.84 )-----------( 203      ( 2020 07:13 )             34.5     04-08    147<H>  |  109<H>  |  86<H>  ----------------------------<  266<H>  4.0   |  23  |  2.97<H>    Ca    8.8      2020 07:13  Phos  3.0     04-08  Mg     2.2     04-08        CAPILLARY BLOOD GLUCOSE      POCT Blood Glucose.: 258 mg/dL (2020 17:20)  POCT Blood Glucose.: 262 mg/dL (2020 08:12)  POCT Blood Glucose.: 235 mg/dL (2020 21:36)        Urinalysis Basic - ( 2020 23:10 )    Color: Yellow / Appearance: Clear / S.019 / pH: x  Gluc: x / Ketone: Negative  / Bili: Negative / Urobili: <2 mg/dL   Blood: x / Protein: 30 mg/dL / Nitrite: Negative   Leuk Esterase: Negative / RBC: 4 /HPF / WBC 5 /HPF   Sq Epi: x / Non Sq Epi: 1 /HPF / Bacteria: Negative              IMAGING: Radiology personally reviewed  Consultants involved in case:   Consultant(s) Notes Reviewed:  [ ] YES  [ ] NO:   Care Discussed with Consultants/Other Providers [ ] YES  [ ] NO

## 2020-04-09 NOTE — PROGRESS NOTE ADULT - PROBLEM SELECTOR PLAN 1
Likely in setting of COVID  - c/w oxygen supplementation via NC (5L on 4/8)  - c/w iv steroids: methylprednisolone 40mg BID given low weight 4/5-4/9  - c/w IVF (LR at 75cc/hr) given lethargy, awaiting bedside swallow eval before advancing diet today  - aspiration precautions  - s/swallow eval recommended dysphagia 1 diet  - pulse ox monitoring

## 2020-04-10 LAB
BASOPHILS # BLD AUTO: 0.03 K/UL — SIGNIFICANT CHANGE UP (ref 0–0.2)
BASOPHILS NFR BLD AUTO: 0.2 % — SIGNIFICANT CHANGE UP (ref 0–2)
EOSINOPHIL # BLD AUTO: 0 K/UL — SIGNIFICANT CHANGE UP (ref 0–0.5)
EOSINOPHIL NFR BLD AUTO: 0 % — SIGNIFICANT CHANGE UP (ref 0–6)
GLUCOSE BLDC GLUCOMTR-MCNC: 166 MG/DL — HIGH (ref 70–99)
GLUCOSE BLDC GLUCOMTR-MCNC: 214 MG/DL — HIGH (ref 70–99)
GLUCOSE BLDC GLUCOMTR-MCNC: 221 MG/DL — HIGH (ref 70–99)
GLUCOSE BLDC GLUCOMTR-MCNC: 244 MG/DL — HIGH (ref 70–99)
HCT VFR BLD CALC: 31.1 % — LOW (ref 39–50)
HGB BLD-MCNC: 9.4 G/DL — LOW (ref 13–17)
IMM GRANULOCYTES NFR BLD AUTO: 0.8 % — SIGNIFICANT CHANGE UP (ref 0–1.5)
LYMPHOCYTES # BLD AUTO: 0.58 K/UL — LOW (ref 1–3.3)
LYMPHOCYTES # BLD AUTO: 3 % — LOW (ref 13–44)
MAGNESIUM SERPL-MCNC: 2.3 MG/DL — SIGNIFICANT CHANGE UP (ref 1.6–2.6)
MCHC RBC-ENTMCNC: 30.2 GM/DL — LOW (ref 32–36)
MCHC RBC-ENTMCNC: 30.9 PG — SIGNIFICANT CHANGE UP (ref 27–34)
MCV RBC AUTO: 102.3 FL — HIGH (ref 80–100)
MONOCYTES # BLD AUTO: 0.7 K/UL — SIGNIFICANT CHANGE UP (ref 0–0.9)
MONOCYTES NFR BLD AUTO: 3.6 % — SIGNIFICANT CHANGE UP (ref 2–14)
NEUTROPHILS # BLD AUTO: 17.86 K/UL — HIGH (ref 1.8–7.4)
NEUTROPHILS NFR BLD AUTO: 92.4 % — HIGH (ref 43–77)
NRBC # BLD: 0 /100 WBCS — SIGNIFICANT CHANGE UP (ref 0–0)
PHOSPHATE SERPL-MCNC: 1.9 MG/DL — LOW (ref 2.5–4.5)
PLATELET # BLD AUTO: 193 K/UL — SIGNIFICANT CHANGE UP (ref 150–400)
RBC # BLD: 3.04 M/UL — LOW (ref 4.2–5.8)
RBC # FLD: 13.8 % — SIGNIFICANT CHANGE UP (ref 10.3–14.5)
WBC # BLD: 19.32 K/UL — HIGH (ref 3.8–10.5)
WBC # FLD AUTO: 19.32 K/UL — HIGH (ref 3.8–10.5)

## 2020-04-10 PROCEDURE — 99233 SBSQ HOSP IP/OBS HIGH 50: CPT

## 2020-04-10 RX ADMIN — PANTOPRAZOLE SODIUM 40 MILLIGRAM(S): 20 TABLET, DELAYED RELEASE ORAL at 12:42

## 2020-04-10 RX ADMIN — Medication 40 MILLIGRAM(S): at 05:14

## 2020-04-10 RX ADMIN — Medication 2: at 09:38

## 2020-04-10 RX ADMIN — HEPARIN SODIUM 5000 UNIT(S): 5000 INJECTION INTRAVENOUS; SUBCUTANEOUS at 22:51

## 2020-04-10 RX ADMIN — HEPARIN SODIUM 5000 UNIT(S): 5000 INJECTION INTRAVENOUS; SUBCUTANEOUS at 12:42

## 2020-04-10 RX ADMIN — Medication 2: at 12:41

## 2020-04-10 NOTE — PROGRESS NOTE ADULT - PROBLEM SELECTOR PLAN 7
- s/p extensive d/w son Kalpesh and daughter Irasema about current critical medical condition and conservative plan of care.  - DNR DNI reaffirmed - witnessed by RN Carmelina.  - Should there be signs of distress, comfort measures may be considered.  - Family readily available via phone numbers in [place].  - Palliative consult placed, recs appreciated: ordered dilaudid prn for air hunger

## 2020-04-10 NOTE — PROGRESS NOTE ADULT - PROBLEM SELECTOR PLAN 1
Likely in setting of COVID  - c/w oxygen supplementation via NC and weaning  - c/w iv steroids: methylprednisolone 40mg BID given low weight 4/5-4/10  - aspiration precautions  - s/swallow eval recommended dysphagia 1 diet  - pulse ox monitoring

## 2020-04-10 NOTE — PROGRESS NOTE ADULT - SUBJECTIVE AND OBJECTIVE BOX
Patient is a 87y old  Male who presents with a chief complaint of fever and hypoxia (05 Apr 2020 17:42)    Subjective: So far tolerating diet.  92% on 6L NCO2    MEDICATIONS  (STANDING):  dextrose 5%. 1000 milliLiter(s) (50 mL/Hr) IV Continuous <Continuous>  dextrose 50% Injectable 12.5 Gram(s) IV Push once  dextrose 50% Injectable 25 Gram(s) IV Push once  dextrose 50% Injectable 25 Gram(s) IV Push once  heparin  Injectable 5000 Unit(s) SubCutaneous every 8 hours  insulin lispro (HumaLOG) corrective regimen sliding scale   SubCutaneous every 6 hours  lactated ringers. 1000 milliLiter(s) (75 mL/Hr) IV Continuous <Continuous>  methylPREDNISolone sodium succinate Injectable 40 milliGRAM(s) IV Push two times a day  pantoprazole  Injectable 40 milliGRAM(s) IV Push daily    MEDICATIONS  (PRN):  dextrose 40% Gel 15 Gram(s) Oral once PRN Blood Glucose LESS THAN 70 milliGRAM(s)/deciliter  glucagon  Injectable 1 milliGRAM(s) IntraMuscular once PRN Glucose LESS THAN 70 milligrams/deciliter  HYDROmorphone  Injectable 0.2 milliGRAM(s) IV Push every 3 hours PRN dyspnea    Allergies    penicillin (Rash)  quinidine (Unknown)    Intolerances    Vital Signs Last 24 Hrs  T(C): 36.2 (10 Apr 2020 04:33), Max: 37 (10 Apr 2020 00:54)  T(F): 97.2 (10 Apr 2020 04:33), Max: 98.6 (10 Apr 2020 00:54)  HR: 115 (10 Apr 2020 04:33) (108 - 115)  BP: 120/78 (10 Apr 2020 04:33) (118/87 - 150/94)  BP(mean): --  RR: 20 (10 Apr 2020 04:33) (20 - 20)  SpO2: 92% (10 Apr 2020 04:33) (90% - 97%)    PHYSICAL EXAM:  GENERAL: NAD, Alert and awake, thin, cachectic elderly male  EYES: EOMI, conjunctiva and sclera clear  LUNGS: Clear to auscultation bilaterally; No wheezing  HEART: Regular rate and rhythm; Normal S1 and S2; No murmurs  ABDOMEN: Soft, Nontender, Nondistended; Bowel sounds present  EXTREMITIES: No LE swelling, 2+ peripheral pulses  NEURO: A&Ox1 and more communicative today      LABS:            CAPILLARY BLOOD GLUCOSE      POCT Blood Glucose.: 221 mg/dL (10 Apr 2020 12:31)  POCT Blood Glucose.: 244 mg/dL (10 Apr 2020 09:17)  POCT Blood Glucose.: 286 mg/dL (09 Apr 2020 22:07)  POCT Blood Glucose.: 258 mg/dL (09 Apr 2020 17:20)                    IMAGING: Radiology personally reviewed  Consultants involved in case:   Consultant(s) Notes Reviewed:  [ ] YES  [ ] NO:   Care Discussed with Consultants/Other Providers [ ] YES  [ ] NO

## 2020-04-10 NOTE — PROGRESS NOTE ADULT - SUBJECTIVE AND OBJECTIVE BOX
[facility] [place] [facility]   MD CLIFTON MARTINEZ MD RUORU WONG, PA    TEL:  OFFICE: [phone]  DR VARGAS CELL: [phone]  GRAZYNA FAN CELL: [phone]  DR. MARTINO CELL: [phone]  DR. LUX CELL: [phone]    FROM 5 PM - 7 AM PLEASE CALL ANSWERING SERVICE: [phone]    RENAL FOLLOW UP NOTE  --------------------------------------------------------------------------------  HPI:      Pt COVID 19 + on isolation    PAST HISTORY  --------------------------------------------------------------------------------  No significant changes to PMH, PSH, FHx, SHx, unless otherwise noted    ALLERGIES & MEDICATIONS  --------------------------------------------------------------------------------  Allergies    penicillin (Rash)  quinidine (Unknown)    Intolerances      Standing Inpatient Medications  dextrose 5%. 1000 milliLiter(s) IV Continuous <Continuous>  dextrose 50% Injectable 12.5 Gram(s) IV Push once  dextrose 50% Injectable 25 Gram(s) IV Push once  dextrose 50% Injectable 25 Gram(s) IV Push once  heparin  Injectable 5000 Unit(s) SubCutaneous every 8 hours  insulin lispro (HumaLOG) corrective regimen sliding scale   SubCutaneous three times a day before meals  insulin lispro (HumaLOG) corrective regimen sliding scale   SubCutaneous at bedtime  lactated ringers. 1000 milliLiter(s) IV Continuous <Continuous>  pantoprazole  Injectable 40 milliGRAM(s) IV Push daily    PRN Inpatient Medications  dextrose 40% Gel 15 Gram(s) Oral once PRN  glucagon  Injectable 1 milliGRAM(s) IntraMuscular once PRN  HYDROmorphone  Injectable 0.2 milliGRAM(s) IV Push every 3 hours PRN      REVIEW OF SYSTEMS  --------------------------------------------------------------------------------  General: no fever    MSK: no edema     VITALS/PHYSICAL EXAM  --------------------------------------------------------------------------------  T(C): 36.2 (04-10-20 @ 04:33), Max: 37 (04-10-20 @ 00:54)  HR: 115 (04-10-20 @ 04:33) (100 - 115)  BP: 120/78 (04-10-20 @ 04:33) (118/87 - 150/94)  RR: 20 (04-10-20 @ 04:33) (18 - 20)  SpO2: 92% (04-10-20 @ 04:33) (90% - 97%)  Wt(kg): --        04-09-20 @ 07:01  -  04-10-20 @ 07:00  --------------------------------------------------------  IN: 0 mL / OUT: 700 mL / NET: -700 mL            LABS/STUDIES  --------------------------------------------------------------------------------                Creatinine Trend:  SCr 2.97 [04-08 @ 07:13]  SCr 3.04 [04-07 @ 07:26]  SCr 2.93 [04-06 @ 07:25]  SCr 2.88 [04-05 @ 14:18]    Urinalysis - [04-07-20 @ 23:10]      Color Yellow / Appearance Clear / SG 1.019 / pH 6.0      Gluc 100 mg/dL / Ketone Negative  / Bili Negative / Urobili <2 mg/dL       Blood Small / Protein 30 mg/dL / Leuk Est Negative / Nitrite Negative      RBC 4 / WBC 5 / Hyaline 0 / Gran  / Sq Epi  / Non Sq Epi 1 / Bacteria Negative    Urine Creatinine 67      [04-07-20 @ 20:15]  Urine Protein 52      [04-07-20 @ 20:15]  Urine Sodium <35      [04-07-20 @ 20:15]    Iron 28, TIBC 129, %sat 22      [10-16-19 @ 09:48]  Ferritin 7776      [04-07-20 @ 08:49]  HbA1c 5.6      [04-06-20 @ 09:54]  TSH 2.08      [12-17-19 @ 10:20]

## 2020-04-10 NOTE — PROGRESS NOTE ADULT - PROBLEM SELECTOR PLAN 3
P/W leukocytosis (15), elevated ESR (120), CRP (29) and Ferritin (5271), with tropenemia (94) in setting of COVID+ sepsis, possible pna (or both)   - iv abx: on aztreonam 1g q8h given allergy to PCN  - Positive COVID (4/5)   - f/u blood cultures 4/5 ngtd   - gentle IVF prn  - monitor Is/Os

## 2020-04-10 NOTE — PROGRESS NOTE ADULT - SUBJECTIVE AND OBJECTIVE BOX
CC: Patient is a 87y old  Male who presents with a chief complaint of fever and hypoxia (10 Apr 2020 12:40)    ID following for COVID 19 infection    Interval History/ROS: Patient now at [facility]. On RA. No fevers. Completed course of steroids.    Rest of ROS negative.    Allergies  penicillin (Rash)  quinidine (Unknown)    ANTIMICROBIALS:      OTHER MEDS:  dextrose 40% Gel 15 Gram(s) Oral once PRN  dextrose 5%. 1000 milliLiter(s) IV Continuous <Continuous>  dextrose 50% Injectable 12.5 Gram(s) IV Push once  dextrose 50% Injectable 25 Gram(s) IV Push once  dextrose 50% Injectable 25 Gram(s) IV Push once  glucagon  Injectable 1 milliGRAM(s) IntraMuscular once PRN  heparin  Injectable 5000 Unit(s) SubCutaneous every 8 hours  HYDROmorphone  Injectable 0.2 milliGRAM(s) IV Push every 3 hours PRN  insulin lispro (HumaLOG) corrective regimen sliding scale   SubCutaneous three times a day before meals  insulin lispro (HumaLOG) corrective regimen sliding scale   SubCutaneous at bedtime  lactated ringers. 1000 milliLiter(s) IV Continuous <Continuous>  pantoprazole  Injectable 40 milliGRAM(s) IV Push daily      PE:    Vital Signs Last 24 Hrs  T(C): 36.7 (10 Apr 2020 15:36), Max: 37 (10 Apr 2020 00:54)  T(F): 98 (10 Apr 2020 15:36), Max: 98.6 (10 Apr 2020 00:54)  HR: 98 (10 Apr 2020 15:36) (98 - 115)  BP: 109/77 (10 Apr 2020 15:36) (109/77 - 150/94)  BP(mean): --  RR: 24 (10 Apr 2020 15:36) (20 - 24)  SpO2: 97% (10 Apr 2020 15:36) (90% - 97%)    Gen: Awake, NAD  CV: S1+S2 normal, no murmurs  Resp: Clear bilat, no resp distress  Abd: Soft, nontender, +BS  Ext: No LE edema, no wounds  : +Hernández  IV/Skin: No thrombophlebitis    LABS:                          9.4    19.32 )-----------( 193      ( 10 Apr 2020 14:14 )             31.1         Phos  1.9     04-10  Mg     2.3     04-10    MICROBIOLOGY:  v  .Urine Clean Catch (Midstream)  04-05-20   No growth  --  --  < from:  Kidney and Bladder (04.07.20 @ 16:49) >  IMPRESSION:    Chronic bilateral medical renal disease without evidence for postrenal obstruction.       < end of copied text >      .Blood Blood-Peripheral  04-05-20   No Growth Final  --  --    RADIOLOGY:

## 2020-04-10 NOTE — PROGRESS NOTE ADULT - PROBLEM SELECTOR PLAN 5
likely due to dehydration, Cr 2.88 (BL 1.27 from 12/2019). DIONTE vs ATN  - possible ATN  - c/w gentle IVF (75cc/hr), continue given persistent rising Cr, 4/8 mildly improved to 2.97 (from 3.04)  - avoid nephrotoxics. monitor intake and output  - vasculitis w/u as outpt  - nephrology following, appreciate recs

## 2020-04-11 DIAGNOSIS — E87.0 HYPEROSMOLALITY AND HYPERNATREMIA: ICD-10-CM

## 2020-04-11 LAB
ANION GAP SERPL CALC-SCNC: 13 MMOL/L — SIGNIFICANT CHANGE UP (ref 5–17)
ANION GAP SERPL CALC-SCNC: 20 MMOL/L — HIGH (ref 5–17)
BUN SERPL-MCNC: 100 MG/DL — HIGH (ref 7–23)
BUN SERPL-MCNC: 99 MG/DL — HIGH (ref 7–23)
CALCIUM SERPL-MCNC: 10.1 MG/DL — SIGNIFICANT CHANGE UP (ref 8.4–10.5)
CALCIUM SERPL-MCNC: 10.9 MG/DL — HIGH (ref 8.4–10.5)
CHLORIDE SERPL-SCNC: 124 MMOL/L — HIGH (ref 96–108)
CHLORIDE SERPL-SCNC: 132 MMOL/L — HIGH (ref 96–108)
CO2 SERPL-SCNC: 21 MMOL/L — LOW (ref 22–31)
CO2 SERPL-SCNC: 25 MMOL/L — SIGNIFICANT CHANGE UP (ref 22–31)
CREAT SERPL-MCNC: 2.52 MG/DL — HIGH (ref 0.5–1.3)
CREAT SERPL-MCNC: 2.79 MG/DL — HIGH (ref 0.5–1.3)
GLUCOSE BLDC GLUCOMTR-MCNC: 212 MG/DL — HIGH (ref 70–99)
GLUCOSE BLDC GLUCOMTR-MCNC: 215 MG/DL — HIGH (ref 70–99)
GLUCOSE BLDC GLUCOMTR-MCNC: 249 MG/DL — HIGH (ref 70–99)
GLUCOSE BLDC GLUCOMTR-MCNC: 271 MG/DL — HIGH (ref 70–99)
GLUCOSE SERPL-MCNC: 257 MG/DL — HIGH (ref 70–99)
GLUCOSE SERPL-MCNC: 301 MG/DL — HIGH (ref 70–99)
HCT VFR BLD CALC: 38.7 % — LOW (ref 39–50)
HCT VFR BLD CALC: 43.7 % — SIGNIFICANT CHANGE UP (ref 39–50)
HGB BLD-MCNC: 11.5 G/DL — LOW (ref 13–17)
HGB BLD-MCNC: 13.2 G/DL — SIGNIFICANT CHANGE UP (ref 13–17)
MCHC RBC-ENTMCNC: 29.7 GM/DL — LOW (ref 32–36)
MCHC RBC-ENTMCNC: 30.2 GM/DL — LOW (ref 32–36)
MCHC RBC-ENTMCNC: 30.9 PG — SIGNIFICANT CHANGE UP (ref 27–34)
MCHC RBC-ENTMCNC: 31.5 PG — SIGNIFICANT CHANGE UP (ref 27–34)
MCV RBC AUTO: 104 FL — HIGH (ref 80–100)
MCV RBC AUTO: 104.3 FL — HIGH (ref 80–100)
NRBC # BLD: 0 /100 WBCS — SIGNIFICANT CHANGE UP (ref 0–0)
NRBC # BLD: 0 /100 WBCS — SIGNIFICANT CHANGE UP (ref 0–0)
PLATELET # BLD AUTO: 224 K/UL — SIGNIFICANT CHANGE UP (ref 150–400)
PLATELET # BLD AUTO: 234 K/UL — SIGNIFICANT CHANGE UP (ref 150–400)
POTASSIUM SERPL-MCNC: 3.6 MMOL/L — SIGNIFICANT CHANGE UP (ref 3.5–5.3)
POTASSIUM SERPL-MCNC: 4.2 MMOL/L — SIGNIFICANT CHANGE UP (ref 3.5–5.3)
POTASSIUM SERPL-SCNC: 3.6 MMOL/L — SIGNIFICANT CHANGE UP (ref 3.5–5.3)
POTASSIUM SERPL-SCNC: 4.2 MMOL/L — SIGNIFICANT CHANGE UP (ref 3.5–5.3)
RBC # BLD: 3.72 M/UL — LOW (ref 4.2–5.8)
RBC # BLD: 4.19 M/UL — LOW (ref 4.2–5.8)
RBC # FLD: 14.4 % — SIGNIFICANT CHANGE UP (ref 10.3–14.5)
RBC # FLD: 14.4 % — SIGNIFICANT CHANGE UP (ref 10.3–14.5)
SODIUM SERPL-SCNC: 165 MMOL/L — CRITICAL HIGH (ref 135–145)
SODIUM SERPL-SCNC: 170 MMOL/L — CRITICAL HIGH (ref 135–145)
WBC # BLD: 23.87 K/UL — HIGH (ref 3.8–10.5)
WBC # BLD: 26.85 K/UL — HIGH (ref 3.8–10.5)
WBC # FLD AUTO: 23.87 K/UL — HIGH (ref 3.8–10.5)
WBC # FLD AUTO: 26.85 K/UL — HIGH (ref 3.8–10.5)

## 2020-04-11 RX ORDER — AZTREONAM 2 G
500 VIAL (EA) INJECTION EVERY 8 HOURS
Refills: 0 | Status: DISCONTINUED | OUTPATIENT
Start: 2020-04-12 | End: 2020-04-13

## 2020-04-11 RX ORDER — SODIUM CHLORIDE 9 MG/ML
1000 INJECTION, SOLUTION INTRAVENOUS
Refills: 0 | Status: DISCONTINUED | OUTPATIENT
Start: 2020-04-11 | End: 2020-04-11

## 2020-04-11 RX ORDER — SODIUM,POTASSIUM PHOSPHATES 278-250MG
2 POWDER IN PACKET (EA) ORAL ONCE
Refills: 0 | Status: COMPLETED | OUTPATIENT
Start: 2020-04-11 | End: 2020-04-11

## 2020-04-11 RX ORDER — AZTREONAM 2 G
500 VIAL (EA) INJECTION ONCE
Refills: 0 | Status: COMPLETED | OUTPATIENT
Start: 2020-04-11 | End: 2020-04-12

## 2020-04-11 RX ORDER — SODIUM CHLORIDE 9 MG/ML
1000 INJECTION, SOLUTION INTRAVENOUS
Refills: 0 | Status: DISCONTINUED | OUTPATIENT
Start: 2020-04-11 | End: 2020-04-12

## 2020-04-11 RX ORDER — AZTREONAM 2 G
VIAL (EA) INJECTION
Refills: 0 | Status: DISCONTINUED | OUTPATIENT
Start: 2020-04-12 | End: 2020-04-13

## 2020-04-11 RX ADMIN — HEPARIN SODIUM 5000 UNIT(S): 5000 INJECTION INTRAVENOUS; SUBCUTANEOUS at 12:24

## 2020-04-11 RX ADMIN — HEPARIN SODIUM 5000 UNIT(S): 5000 INJECTION INTRAVENOUS; SUBCUTANEOUS at 06:36

## 2020-04-11 RX ADMIN — SODIUM CHLORIDE 65 MILLILITER(S): 9 INJECTION, SOLUTION INTRAVENOUS at 23:26

## 2020-04-11 RX ADMIN — SODIUM CHLORIDE 60 MILLILITER(S): 9 INJECTION, SOLUTION INTRAVENOUS at 12:24

## 2020-04-11 RX ADMIN — HEPARIN SODIUM 5000 UNIT(S): 5000 INJECTION INTRAVENOUS; SUBCUTANEOUS at 21:18

## 2020-04-11 RX ADMIN — Medication 3: at 16:54

## 2020-04-11 RX ADMIN — Medication 2: at 08:36

## 2020-04-11 RX ADMIN — Medication 2: at 13:06

## 2020-04-11 RX ADMIN — PANTOPRAZOLE SODIUM 40 MILLIGRAM(S): 20 TABLET, DELAYED RELEASE ORAL at 08:46

## 2020-04-11 NOTE — PROGRESS NOTE ADULT - PROBLEM SELECTOR PLAN 8
- s/p extensive d/w son Kalpesh and daughter Irasema about current critical medical condition and conservative plan of care.  - DNR DNI reaffirmed - witnessed by RN Carmelina.  - Should there be signs of distress, comfort measures may be considered.  - Family readily available via phone numbers in [place].  - Palliative consult placed, recs appreciated: ordered dilaudid prn for air hunger

## 2020-04-11 NOTE — PROGRESS NOTE ADULT - SUBJECTIVE AND OBJECTIVE BOX
[facility]   MD CLIFTON MARTINEZ MD RUORU WONG, PA    TEL:  OFFICE: [phone]  DR VARGAS CELL: [phone]  GRAZYNA FAN CELL: [phone]  DR. MARTINO CELL: [phone]  DR. LUX CELL: [phone]    FROM 5 PM - 7 AM PLEASE CALL ANSWERING SERVICE: [phone]    RENAL FOLLOW UP NOTE  --------------------------------------------------------------------------------  HPI:      Pt COVID 19 + on isolation    PAST HISTORY  --------------------------------------------------------------------------------  No significant changes to PMH, PSH, FHx, SHx, unless otherwise noted    ALLERGIES & MEDICATIONS  --------------------------------------------------------------------------------  Allergies    penicillin (Rash)  quinidine (Unknown)    Intolerances      Standing Inpatient Medications  dextrose 5%. 1000 milliLiter(s) IV Continuous <Continuous>  dextrose 50% Injectable 12.5 Gram(s) IV Push once  dextrose 50% Injectable 25 Gram(s) IV Push once  dextrose 50% Injectable 25 Gram(s) IV Push once  heparin  Injectable 5000 Unit(s) SubCutaneous every 8 hours  insulin lispro (HumaLOG) corrective regimen sliding scale   SubCutaneous three times a day before meals  insulin lispro (HumaLOG) corrective regimen sliding scale   SubCutaneous at bedtime  lactated ringers. 1000 milliLiter(s) IV Continuous <Continuous>  pantoprazole  Injectable 40 milliGRAM(s) IV Push daily  potassium phosphate / sodium phosphate powder 2 Packet(s) Oral once    PRN Inpatient Medications  dextrose 40% Gel 15 Gram(s) Oral once PRN  glucagon  Injectable 1 milliGRAM(s) IntraMuscular once PRN  HYDROmorphone  Injectable 0.2 milliGRAM(s) IV Push every 3 hours PRN      REVIEW OF SYSTEMS  --------------------------------------------------------------------------------  General: no fever,  MSK: no edema     VITALS/PHYSICAL EXAM  --------------------------------------------------------------------------------  T(C): 36.4 (04-11-20 @ 05:09), Max: 36.7 (04-10-20 @ 15:36)  HR: 100 (04-11-20 @ 05:09) (98 - 118)  BP: 112/73 (04-11-20 @ 05:09) (109/77 - 112/73)  RR: 22 (04-11-20 @ 09:08) (22 - 24)  SpO2: 87% (04-11-20 @ 09:08) (87% - 97%)  Wt(kg): --        04-10-20 @ 07:01  -  04-11-20 @ 07:00  --------------------------------------------------------  IN: 0 mL / OUT: 200 mL / NET: -200 mL        	    LABS/STUDIES  --------------------------------------------------------------------------------              9.4    19.32 >-----------<  193      [04-10-20 @ 14:14]              31.1           Mg     2.3     [04-10-20 @ 14:14]      Phos  1.9     [04-10-20 @ 14:14]            Creatinine Trend:  SCr 2.97 [04-08 @ 07:13]  SCr 3.04 [04-07 @ 07:26]  SCr 2.93 [04-06 @ 07:25]  SCr 2.88 [04-05 @ 14:18]    Urinalysis - [04-07-20 @ 23:10]      Color Yellow / Appearance Clear / SG 1.019 / pH 6.0      Gluc 100 mg/dL / Ketone Negative  / Bili Negative / Urobili <2 mg/dL       Blood Small / Protein 30 mg/dL / Leuk Est Negative / Nitrite Negative      RBC 4 / WBC 5 / Hyaline 0 / Gran  / Sq Epi  / Non Sq Epi 1 / Bacteria Negative    Urine Creatinine 67      [04-07-20 @ 20:15]  Urine Protein 52      [04-07-20 @ 20:15]  Urine Sodium <35      [04-07-20 @ 20:15]    Iron 28, TIBC 129, %sat 22      [10-16-19 @ 09:48]  Ferritin 7776      [04-07-20 @ 08:49]  HbA1c 5.6      [04-06-20 @ 09:54]  TSH 2.08      [12-17-19 @ 10:20]

## 2020-04-11 NOTE — PROGRESS NOTE ADULT - SUBJECTIVE AND OBJECTIVE BOX
Patient is a 87y old  Male who presents with a chief complaint of fever and hypoxia (05 Apr 2020 17:42)    Subjective: No fevers or chills; pt became tachypneic last night, desaturated to mid 80s, placed on 15L NRB    Vital Signs Last 24 Hrs  T(C): 36.4 (11 Apr 2020 05:09), Max: 36.7 (10 Apr 2020 15:36)  T(F): 97.5 (11 Apr 2020 05:09), Max: 98 (10 Apr 2020 15:36)  HR: 100 (11 Apr 2020 05:09) (98 - 118)  BP: 112/73 (11 Apr 2020 05:09) (109/77 - 112/73)  BP(mean): --  RR: 22 (11 Apr 2020 05:09) (22 - 24)  SpO2: 89% (11 Apr 2020 05:09) (87% - 97%)    PHYSICAL EXAM:  GENERAL: NAD, Alert and awake, thin, cachectic elderly male, on NRB  EYES: EOMI, conjunctiva and sclera clear  LUNGS: Clear to auscultation bilaterally; No wheezing  HEART: Regular rate and rhythm; Normal S1 and S2; No murmurs  ABDOMEN: Soft, Nontender, Nondistended; Bowel sounds present  EXTREMITIES: No LE swelling, 2+ peripheral pulses  NEURO: A&Ox1, could not perform neuro exam 2' inability to follow commands effectively      LABS:                        9.4    19.32 )-----------( 193      ( 10 Apr 2020 14:14 )             31.1       Phos  1.9     04-10  Mg     2.3     04-10        CAPILLARY BLOOD GLUCOSE      POCT Blood Glucose.: 249 mg/dL (11 Apr 2020 08:25)  POCT Blood Glucose.: 166 mg/dL (10 Apr 2020 21:48)  POCT Blood Glucose.: 214 mg/dL (10 Apr 2020 18:03)  POCT Blood Glucose.: 221 mg/dL (10 Apr 2020 12:31)  POCT Blood Glucose.: 244 mg/dL (10 Apr 2020 09:17)

## 2020-04-11 NOTE — PROGRESS NOTE ADULT - PROBLEM SELECTOR PLAN 3
P/W leukocytosis (15), elevated ESR (120), CRP (29) and Ferritin (5271), with tropenemia (94) in setting of COVID+ sepsis, possible pna (or both)   - iv abx: s/p aztreonam 1g q8h x2 days  - Positive COVID (4/5)   - f/u blood cultures 4/5 ngtd   - gentle IVF prn  - monitor Is/Os 2/2 COVID  - diet as above  - IV abx renally dosed. s/p Aztreonam (4/5- 4/6) x 2d  - ID following, appreciate recs  - supportive care for covid, steroids 5d course (4/5-4/9)

## 2020-04-11 NOTE — PROGRESS NOTE ADULT - PROBLEM SELECTOR PLAN 4
likely related to acute infection and respiratory failure, now communicative  - avoid sedatives/hypnotics  - supportive care for COVID as above P/W leukocytosis (15), elevated ESR (120), CRP (29) and Ferritin (5271), with tropenemia (94) in setting of COVID+ sepsis, possible pna (or both)   - iv abx: s/p aztreonam 1g q8h x2 days  - Positive COVID (4/5)   - f/u blood cultures 4/5 ngtd   - gentle IVF prn  - monitor Is/Os

## 2020-04-11 NOTE — PROGRESS NOTE ADULT - PROBLEM SELECTOR PLAN 6
DVT Ppx: heparin SQ q8hr  Diet: dysphagia diet stage 1 likely due to dehydration, Cr 2.88 (BL 1.27 from 12/2019). DIONTE vs ATN  - possible ATN  - s/p LR gentle IVF (75cc/hr)  - avoid nephrotoxics. monitor intake and output  - vasculitis w/u as outpt  - nephrology following, appreciate recs

## 2020-04-11 NOTE — PROVIDER CONTACT NOTE (CRITICAL VALUE NOTIFICATION) - ACTION/TREATMENT ORDERED:
Monitor labs, intervention to be determined Monitor labs, D5 with Normal saline @60ml/hr. Redraw labs 6pm

## 2020-04-11 NOTE — PROGRESS NOTE ADULT - PROBLEM SELECTOR PLAN 1
Likely in setting of COVID  - c/w oxygen supplementation via NRB and attempt to wean as tolerated  - s/p methylprednisolone 40mg BID 4/5-4/10  - aspiration precautions  - s/swallow eval recommended dysphagia 1 diet  - pulse ox monitoring

## 2020-04-11 NOTE — PROVIDER CONTACT NOTE (CRITICAL VALUE NOTIFICATION) - ASSESSMENT
AM Labs showing sodium level 165, Pt nonverbal, uninterested in hydration/eating at this time. No s/s of discomfort.

## 2020-04-11 NOTE — PROGRESS NOTE ADULT - PROBLEM SELECTOR PLAN 5
likely due to dehydration, Cr 2.88 (BL 1.27 from 12/2019). DIONTE vs ATN  - possible ATN  - s/p LR gentle IVF (75cc/hr)  - avoid nephrotoxics. monitor intake and output  - vasculitis w/u as outpt  - nephrology following, appreciate recs likely related to acute infection and respiratory failure, now communicative  - avoid sedatives/hypnotics  - supportive care for COVID as above

## 2020-04-11 NOTE — PROGRESS NOTE ADULT - PROBLEM SELECTOR PLAN 2
2/2 COVID  - diet as above  - IV abx renally dosed. s/p Aztreonam (4/5- 4/6) x 2d  - ID following, appreciate recs  - supportive care for covid, steroids 5d course (4/5-4/9) Start D5W @ 65 cc/hr  Daily BMP

## 2020-04-11 NOTE — CHART NOTE - NSCHARTNOTEFT_GEN_A_CORE
Patient's Na (170) - started on IVF: D5W @65cc/hr x 12 hrs and repeat BMP in am. WBC (26.85) BCx x 2 ordered, started on Aztreonam 500mg Q8 (renal dose per pharmacy) & Doxycycline 100mg Q12, repeat CBC in am. Discussed above with Dr. Rice.

## 2020-04-11 NOTE — PROGRESS NOTE ADULT - PROBLEM SELECTOR PLAN 7
- s/p extensive d/w son Kalpesh and daughter Irasema about current critical medical condition and conservative plan of care.  - DNR DNI reaffirmed - witnessed by RN Carmelina.  - Should there be signs of distress, comfort measures may be considered.  - Family readily available via phone numbers in [place].  - Palliative consult placed, recs appreciated: ordered dilaudid prn for air hunger DVT Ppx: heparin SQ q8hr  Diet: dysphagia diet stage 1

## 2020-04-12 VITALS
SYSTOLIC BLOOD PRESSURE: 151 MMHG | TEMPERATURE: 99 F | RESPIRATION RATE: 22 BRPM | OXYGEN SATURATION: 96 % | DIASTOLIC BLOOD PRESSURE: 81 MMHG | HEART RATE: 71 BPM

## 2020-04-12 DIAGNOSIS — R73.9 HYPERGLYCEMIA, UNSPECIFIED: ICD-10-CM

## 2020-04-12 LAB
ALBUMIN SERPL ELPH-MCNC: 1.9 G/DL — LOW (ref 3.3–5)
ALP SERPL-CCNC: 82 U/L — SIGNIFICANT CHANGE UP (ref 40–120)
ALT FLD-CCNC: 8 U/L — LOW (ref 10–45)
ANION GAP SERPL CALC-SCNC: 13 MMOL/L — SIGNIFICANT CHANGE UP (ref 5–17)
ANION GAP SERPL CALC-SCNC: 14 MMOL/L — SIGNIFICANT CHANGE UP (ref 5–17)
AST SERPL-CCNC: 8 U/L — LOW (ref 10–40)
BILIRUB SERPL-MCNC: 0.4 MG/DL — SIGNIFICANT CHANGE UP (ref 0.2–1.2)
BUN SERPL-MCNC: 103 MG/DL — HIGH (ref 7–23)
BUN SERPL-MCNC: 96 MG/DL — HIGH (ref 7–23)
CALCIUM SERPL-MCNC: 7.9 MG/DL — LOW (ref 8.4–10.5)
CALCIUM SERPL-MCNC: 9.6 MG/DL — SIGNIFICANT CHANGE UP (ref 8.4–10.5)
CHLORIDE SERPL-SCNC: 107 MMOL/L — SIGNIFICANT CHANGE UP (ref 96–108)
CHLORIDE SERPL-SCNC: 130 MMOL/L — HIGH (ref 96–108)
CO2 SERPL-SCNC: 21 MMOL/L — LOW (ref 22–31)
CO2 SERPL-SCNC: 24 MMOL/L — SIGNIFICANT CHANGE UP (ref 22–31)
CREAT SERPL-MCNC: 2.81 MG/DL — HIGH (ref 0.5–1.3)
CREAT SERPL-MCNC: 2.83 MG/DL — HIGH (ref 0.5–1.3)
CRP SERPL-MCNC: 8.76 MG/DL — HIGH (ref 0–0.4)
FERRITIN SERPL-MCNC: 1939 NG/ML — HIGH (ref 30–400)
GLUCOSE BLDC GLUCOMTR-MCNC: 191 MG/DL — HIGH (ref 70–99)
GLUCOSE BLDC GLUCOMTR-MCNC: 205 MG/DL — HIGH (ref 70–99)
GLUCOSE BLDC GLUCOMTR-MCNC: 243 MG/DL — HIGH (ref 70–99)
GLUCOSE BLDC GLUCOMTR-MCNC: 358 MG/DL — HIGH (ref 70–99)
GLUCOSE SERPL-MCNC: 346 MG/DL — HIGH (ref 70–99)
GLUCOSE SERPL-MCNC: 716 MG/DL — CRITICAL HIGH (ref 70–99)
HCT VFR BLD CALC: 38.6 % — LOW (ref 39–50)
HGB BLD-MCNC: 11.2 G/DL — LOW (ref 13–17)
MCHC RBC-ENTMCNC: 29 GM/DL — LOW (ref 32–36)
MCHC RBC-ENTMCNC: 30.7 PG — SIGNIFICANT CHANGE UP (ref 27–34)
MCV RBC AUTO: 105.8 FL — HIGH (ref 80–100)
NRBC # BLD: 0 /100 WBCS — SIGNIFICANT CHANGE UP (ref 0–0)
OSMOLALITY UR: 532 MOSM/KG — SIGNIFICANT CHANGE UP (ref 50–1200)
PHOSPHATE SERPL-MCNC: 3.6 MG/DL — SIGNIFICANT CHANGE UP (ref 2.5–4.5)
PLATELET # BLD AUTO: 197 K/UL — SIGNIFICANT CHANGE UP (ref 150–400)
POTASSIUM SERPL-MCNC: 3 MMOL/L — LOW (ref 3.5–5.3)
POTASSIUM SERPL-MCNC: 3.9 MMOL/L — SIGNIFICANT CHANGE UP (ref 3.5–5.3)
POTASSIUM SERPL-SCNC: 3 MMOL/L — LOW (ref 3.5–5.3)
POTASSIUM SERPL-SCNC: 3.9 MMOL/L — SIGNIFICANT CHANGE UP (ref 3.5–5.3)
PROT SERPL-MCNC: 5.3 G/DL — LOW (ref 6–8.3)
RBC # BLD: 3.65 M/UL — LOW (ref 4.2–5.8)
RBC # FLD: 14.5 % — SIGNIFICANT CHANGE UP (ref 10.3–14.5)
SODIUM SERPL-SCNC: 141 MMOL/L — SIGNIFICANT CHANGE UP (ref 135–145)
SODIUM SERPL-SCNC: 169 MMOL/L — CRITICAL HIGH (ref 135–145)
SODIUM UR-SCNC: <35 MMOL/L — SIGNIFICANT CHANGE UP
WBC # BLD: 22.81 K/UL — HIGH (ref 3.8–10.5)
WBC # FLD AUTO: 22.81 K/UL — HIGH (ref 3.8–10.5)

## 2020-04-12 PROCEDURE — 71045 X-RAY EXAM CHEST 1 VIEW: CPT | Mod: 26

## 2020-04-12 RX ORDER — SODIUM CHLORIDE 9 MG/ML
1000 INJECTION, SOLUTION INTRAVENOUS
Refills: 0 | Status: DISCONTINUED | OUTPATIENT
Start: 2020-04-12 | End: 2020-04-13

## 2020-04-12 RX ORDER — INSULIN GLARGINE 100 [IU]/ML
5 INJECTION, SOLUTION SUBCUTANEOUS AT BEDTIME
Refills: 0 | Status: DISCONTINUED | OUTPATIENT
Start: 2020-04-12 | End: 2020-04-13

## 2020-04-12 RX ADMIN — Medication 50 MILLIGRAM(S): at 14:40

## 2020-04-12 RX ADMIN — INSULIN GLARGINE 5 UNIT(S): 100 INJECTION, SOLUTION SUBCUTANEOUS at 20:40

## 2020-04-12 RX ADMIN — Medication 2: at 18:29

## 2020-04-12 RX ADMIN — Medication 50 MILLIGRAM(S): at 03:10

## 2020-04-12 RX ADMIN — HEPARIN SODIUM 5000 UNIT(S): 5000 INJECTION INTRAVENOUS; SUBCUTANEOUS at 05:20

## 2020-04-12 RX ADMIN — Medication 5: at 09:03

## 2020-04-12 RX ADMIN — SODIUM CHLORIDE 75 MILLILITER(S): 9 INJECTION, SOLUTION INTRAVENOUS at 11:01

## 2020-04-12 RX ADMIN — Medication 50 MILLIGRAM(S): at 20:47

## 2020-04-12 RX ADMIN — HEPARIN SODIUM 5000 UNIT(S): 5000 INJECTION INTRAVENOUS; SUBCUTANEOUS at 20:47

## 2020-04-12 RX ADMIN — Medication 2: at 14:25

## 2020-04-12 RX ADMIN — Medication 110 MILLIGRAM(S): at 01:54

## 2020-04-12 RX ADMIN — HEPARIN SODIUM 5000 UNIT(S): 5000 INJECTION INTRAVENOUS; SUBCUTANEOUS at 14:27

## 2020-04-12 RX ADMIN — Medication 110 MILLIGRAM(S): at 16:43

## 2020-04-12 RX ADMIN — PANTOPRAZOLE SODIUM 40 MILLIGRAM(S): 20 TABLET, DELAYED RELEASE ORAL at 14:28

## 2020-04-12 NOTE — PROGRESS NOTE ADULT - PROBLEM SELECTOR PLAN 6
likely due to dehydration, Cr 2.88 (BL 1.27 from 12/2019). DIONTE vs ATN  - possible ATN  - s/p LR gentle IVF (75cc/hr)  - avoid nephrotoxics. monitor intake and output  - vasculitis w/u as outpt  - nephrology following, appreciate recs

## 2020-04-12 NOTE — PROGRESS NOTE ADULT - SUBJECTIVE AND OBJECTIVE BOX
Patient is a 87y old  Male who presents with a chief complaint of fever and hypoxia (05 Apr 2020 17:42)    Subjective: Saturating 92% 15L NRB; started on D5W last evening after getting D5NS x 11 hrs during daytime  NS is 169 this morning, increased D5W to 75 cc/hr  Started on aztroonam/doxycycline for increasing WBC and O2 requiremtns yesterday    Vital Signs Last 24 Hrs  T(C): 36.1 (12 Apr 2020 04:12), Max: 36.5 (11 Apr 2020 17:16)  T(F): 97 (12 Apr 2020 04:12), Max: 97.7 (11 Apr 2020 17:16)  HR: 75 (12 Apr 2020 04:12) (75 - 111)  BP: 102/57 (12 Apr 2020 04:12) (102/57 - 102/59)  BP(mean): --  RR: 19 (12 Apr 2020 04:12) (19 - 24)  SpO2: 92% (12 Apr 2020 04:12) (91% - 92%)    PHYSICAL EXAM:  GENERAL: NAD, Alert and awake, thin, cachectic elderly male, on NRB  EYES: EOMI, conjunctiva and sclera clear  LUNGS: Clear to auscultation bilaterally; No wheezing  HEART: Regular rate and rhythm; Normal S1 and S2; No murmurs  ABDOMEN: Soft, Nontender, Nondistended; Bowel sounds present  EXTREMITIES: No LE swelling, 2+ peripheral pulses  NEURO: A&Ox1, could not perform neuro exam 2' inability to follow commands effectively        LABS:                        11.2   22.81 )-----------( 197      ( 12 Apr 2020 08:31 )             38.6     04-12    169<HH>  |  130<H>  |  103<H>  ----------------------------<  346<H>  3.9   |  24  |  2.83<H>    Ca    9.6      12 Apr 2020 08:31  Phos  3.6     04-12  Mg     2.3     04-10        CAPILLARY BLOOD GLUCOSE      POCT Blood Glucose.: 358 mg/dL (12 Apr 2020 08:13)  POCT Blood Glucose.: 212 mg/dL (11 Apr 2020 21:15)  POCT Blood Glucose.: 271 mg/dL (11 Apr 2020 16:49)  POCT Blood Glucose.: 215 mg/dL (11 Apr 2020 12:42)

## 2020-04-12 NOTE — PROGRESS NOTE ADULT - PROBLEM SELECTOR PLAN 7
DVT Ppx: heparin SQ q8hr  Diet: dysphagia diet stage 1 Hgb1ac 5.6  2' recently completed steroids  Now on D5W for hypernatremia  Initiated lantus 5 units qhs

## 2020-04-12 NOTE — PROGRESS NOTE ADULT - REASON FOR ADMISSION
fever and hypoxia

## 2020-04-12 NOTE — PROGRESS NOTE ADULT - PROBLEM SELECTOR PLAN 4
P/W leukocytosis (15), elevated ESR (120), CRP (29) and Ferritin (5271), with tropenemia (94) in setting of COVID+ sepsis, possible pna (or both)   - iv abx: s/p aztreonam 1g q8h x2 days  - Positive COVID (4/5)   - f/u blood cultures 4/5 ngtd   - gentle IVF prn  - monitor Is/Os

## 2020-04-12 NOTE — PROGRESS NOTE ADULT - ASSESSMENT
86 yo M with history of DM type 2, hyperlipidemia, frailty, esophagitis, bifascicular block BIBEMS from [facility] for hypoxia and history of pneumonia for one week., found to be COVID 19 +.     DIONTE  Baseline Scr appears to be 1.1-1.3  Scr elevate to 3 today  DIONTE etiology?, possibly ATN from infection/ COVID   Urine studies suggestive of ATN  renal sonogram with no osbtruction  s/p Vanco on 4/5-- monitor trough. last one not elevated  On protonix - -monitor cbc with diff for AIN  Monitor BMP and Strict I/O  Avoid further nephrotoxics, NSAIDS RCA    Hematuria  renal sonogram with no mass/ lesion  in view of covid, will hold up on vasculitis work up at present  MOnitor at present     Proteinuria  0.8  in view of covid , will hold up on vasculitis work up at present  Monitor at present
86 yo M with history of DM type 2, hyperlipidemia, frailty, esophagitis, bifascicular block BIBEMS from [facility] for hypoxia and history of pneumonia for one week., found to be COVID 19 +.     DIONTE  Baseline Scr appears to be 1.1-1.3  Scr elevate to 3 today  DIONTE etiology?, possibly ATN from infection/ COVID   pending BMP today  Urine studies suggestive of ATN  renal sonogram with no osbtruction  s/p Vanco on 4/5-- monitor trough. last one not elevated  On protonix - -monitor cbc with diff for AIN  Monitor BMP and Strict I/O  Avoid further nephrotoxics, NSAIDS RCA    Hematuria  renal sonogram with no mass/ lesion  in view of covid, will hold up on vasculitis work up at present  MOnitor at present     Proteinuria  0.8  in view of covid , will hold up on vasculitis work up at present  Monitor at present
86 yo M with history of DM type 2, hyperlipidemia, frailty, esophagitis, bifascicular block BIBEMS from [facility] for hypoxia and history of pneumonia for one week., found to be COVID 19 +.     DIONTE  Baseline Scr appears to be 1.1-1.3  Scr peaked to  3   DIONTE etiology?, possibly ATN from infection/ COVID   Urine studies suggestive of ATN  renal sonogram with no osbtruction  On protonix - -monitor cbc with diff for AIN  Monitor BMP and Strict I/O  Avoid further nephrotoxics, NSAIDS RCA    Hypernatremia  Started on D5 at 75cc/hr per primary team  Continue D5 at 75cc/hr -- please check repeat BMP at 4 pm and call nephrology wit results at [phone]  Please OPTIMIZE glucose control  Check Urine na, Urine OS   MOnitor closely     Hematuria  renal sonogram with no mass/ lesion  in view of covid, will hold up on vasculitis work up at present  MOnitor at present     Proteinuria  0.8  in view of covid , will hold up on vasculitis work up at present  Monitor at present    Hypophosphatemia:  Replted  improved  MOnitor serum PO4
87 male with sepsis from pneumonia, r/o COVID along with DIONTE/dehydration/ATN, acute metabolic encephalopathy in setting of acute hypoxic respiratory failure.
87 year old male with DM2, HLD, bifascicular block from [facility] for hypoxia and concern for COVID 19.    Recommend:  #COVID 19 infection with viral PNA  -Continue supportive care  -Currently on RA comfortable  -Continue to monitor O2 saturation  -Discontinue abx  -Completed course of solumedrol    #Hypoxic resp failure  -Now on RA  -Continue to monitor respiratory status    #Fever  -Defervesced  -From COVID infection  -Continue to monitor fever curve    #Leukocytosis  -Completed course of steroids  -Continue to trend    #DIONTE  -Continue to monitor Cr      Bruce Spicer MD  Pager [phone]  After 5pm/weekends call [phone] .
88 yo M with history of DM type 2, hyperlipidemia, frailty, esophagitis, bifascicular block BIBEMS from [facility] for hypoxia and history of pneumonia for one week., found to be COVID 19 +.     DIONTE  Baseline Scr appears to be 1.1-1.3  Scr elevate to 3 today  DIONTE etiology?, possibly ATN from infection/ COVID   Urine studies suggestive of ATN  renal sonogram with no osbtruction  s/p Vanco on 4/5-- monitor trough. last one not elevated  On protonix - -monitor cbc with diff for AIN  Monitor BMP and Strict I/O  Avoid further nephrotoxics, NSAIDS RCA    Hematuria  renal sonogram with no mass/ lesion  in view of covid, will hold up on vasculitis work up at present  MOnitor at present     Proteinuria  0.8  in view of covid , will hold up on vasculitis work up at present  Monitor at present    Hypophosphatemia:  Replted today  MOnitor serum PO4
88 yo M with history of DM type 2, hyperlipidemia, frailty, esophagitis, bifascicular block BIBEMS from [facility] for hypoxia and history of pneumonia for one week., found to be COVID 19 +.     DIONTE  Baseline Scr appears to be 1.1-1.3  Scr elevate to 3 today  DIONTE etiology?, possibly ATN from infection/ COVID   pending BMP today  Urine studies suggestive of ATN  renal sonogram with no osbtruction  s/p Vanco on 4/5-- monitor trough. last one not elevated  On protonix - -monitor cbc with diff for AIN  Monitor BMP and Strict I/O  Avoid further nephrotoxics, NSAIDS RCA    Hematuria  renal sonogram with no mass/ lesion  in view of covid, will hold up on vasculitis work up at present  MOnitor at present     Proteinuria  0.8  in view of covid , will hold up on vasculitis work up at present  Monitor at present
87 year old male with DM2, HLD, bifascicular block from [facility] for hypoxia and concern for COVID 19.    Recommend:  #Severe sepsis (fever, tachycardia, leukocytosis, hypotension) secondary to COVID 19 infection with viral PNA  -Continue supportive care  -Wean off oxygen as tolerated - currently on NRB  -Discontinue abx  -Blood cultures sent - so far no growth  -If able to tolerate po, can start plaquenil   -EKG if being started on Plaquenil  -Appreciate palliative input    #Hypoxic resp failure  -Continue NRB  -Wean off NRB as tolerated    #Fever  -From COVID infection  -Continue to monitor fever curve    #Leukocytosis/ bandemia  -From COVID  -Does not appear to be aspirating  -Continue to trend    #DIONTE  -Continue to monitor Cr    Will sign off. ID will remains available for any questions.    Bruce Spicer MD  Pager [phone]  After 5pm/weekends call [phone] .

## 2020-04-12 NOTE — PROGRESS NOTE ADULT - PROBLEM SELECTOR PLAN 3
2/2 COVID  - diet as above  - IV abx renally dosed. s/p Aztreonam (4/5- 4/6) x 2d  - ID following, appreciate recs  - supportive care for covid, steroids 5d course (4/5-4/9)  - increased WBC and O2 requirements on 4/11/20, blood cxs drawn and started on aztreonam/doxycycline  - repeat CRP /ferritin ordered for 4/12/20

## 2020-04-12 NOTE — PROGRESS NOTE ADULT - PROBLEM SELECTOR PLAN 9
- s/p extensive d/w son Kalpesh and daughter Irasema about current critical medical condition and conservative plan of care.  - DNR DNI reaffirmed - witnessed by RN Carmelina.  - Should there be signs of distress, comfort measures may be considered.  - Family readily available via phone numbers in [place].  - Palliative consult placed, recs appreciated: ordered dilaudid prn for air hunger

## 2020-04-12 NOTE — PROGRESS NOTE ADULT - PROBLEM SELECTOR PLAN 5
likely related to acute infection and respiratory failure, hypernatremia  - avoid sedatives/hypnotics  - supportive care for COVID as above

## 2020-04-12 NOTE — PROGRESS NOTE ADULT - PROBLEM SELECTOR PLAN 8
- s/p extensive d/w son Kalpesh and daughter Irasema about current critical medical condition and conservative plan of care.  - DNR DNI reaffirmed - witnessed by RN Carmelina.  - Should there be signs of distress, comfort measures may be considered.  - Family readily available via phone numbers in [place].  - Palliative consult placed, recs appreciated: ordered dilaudid prn for air hunger DVT Ppx: heparin SQ q8hr  Diet: dysphagia diet stage 1

## 2020-04-12 NOTE — PROGRESS NOTE ADULT - PROBLEM SELECTOR PLAN 2
started on D5W 65 cc/hr evening of 4/11/20 after getting D5NS x 11 hrs during daytime  NS is 169 this morning, increased D5W to 75 cc/hr  repeat BMP ordered for 8 PM tonight

## 2020-04-12 NOTE — PROGRESS NOTE ADULT - SUBJECTIVE AND OBJECTIVE BOX
Results faxed to Enedelia Cardenas at [phone].  Pc with Mirian's mother Jerry, informed of normal results and that they were faxed.    She was thankful for the call.  No further needs at this time.    [facility]   MD CLIFTON MARTINEZ MD RUORU WONG, PA    TEL:  OFFICE: [phone]  DR VARGAS CELL: [phone]  GRAZYNA FAN CELL: [phone]  DR. MARTINO CELL: [phone]  DR. LUX CELL: [phone]    FROM 5 PM - 7 AM PLEASE CALL ANSWERING SERVICE: [phone]    RENAL FOLLOW UP NOTE  --------------------------------------------------------------------------------  HPI:      Pt COVID 19 + on isolation    PAST HISTORY  --------------------------------------------------------------------------------  No significant changes to PMH, PSH, FHx, SHx, unless otherwise noted    ALLERGIES & MEDICATIONS  --------------------------------------------------------------------------------  Allergies    penicillin (Rash)  quinidine (Unknown)    Intolerances      Standing Inpatient Medications  aztreonam  IVPB      aztreonam  IVPB 500 milliGRAM(s) IV Intermittent every 8 hours  dextrose 5%. 1000 milliLiter(s) IV Continuous <Continuous>  dextrose 5%. 1000 milliLiter(s) IV Continuous <Continuous>  dextrose 50% Injectable 12.5 Gram(s) IV Push once  dextrose 50% Injectable 25 Gram(s) IV Push once  dextrose 50% Injectable 25 Gram(s) IV Push once  doxycycline IVPB 100 milliGRAM(s) IV Intermittent every 12 hours  doxycycline IVPB      heparin  Injectable 5000 Unit(s) SubCutaneous every 8 hours  insulin lispro (HumaLOG) corrective regimen sliding scale   SubCutaneous three times a day before meals  insulin lispro (HumaLOG) corrective regimen sliding scale   SubCutaneous at bedtime  pantoprazole  Injectable 40 milliGRAM(s) IV Push daily    PRN Inpatient Medications  dextrose 40% Gel 15 Gram(s) Oral once PRN  glucagon  Injectable 1 milliGRAM(s) IntraMuscular once PRN  HYDROmorphone  Injectable 0.2 milliGRAM(s) IV Push every 3 hours PRN      REVIEW OF SYSTEMS  --------------------------------------------------------------------------------  General: no fever  MSK: no edema     VITALS/PHYSICAL EXAM  --------------------------------------------------------------------------------  T(C): 36.1 (04-12-20 @ 04:12), Max: 36.5 (04-11-20 @ 17:16)  HR: 75 (04-12-20 @ 04:12) (75 - 111)  BP: 102/57 (04-12-20 @ 04:12) (102/57 - 102/59)  RR: 19 (04-12-20 @ 04:12) (19 - 24)  SpO2: 92% (04-12-20 @ 04:12) (91% - 92%)  Wt(kg): --        04-11-20 @ 07:01  -  04-12-20 @ 07:00  --------------------------------------------------------  IN: 0 mL / OUT: 600 mL / NET: -600 mL    	    LABS/STUDIES  --------------------------------------------------------------------------------              11.2   22.81 >-----------<  197      [04-12-20 @ 08:31]              38.6     169  |  130  |  103  ----------------------------<  346      [04-12-20 @ 08:31]  3.9   |  24  |  2.83        Ca     9.6     [04-12-20 @ 08:31]      Mg     2.3     [04-10-20 @ 14:14]      Phos  3.6     [04-12-20 @ 08:31]            Creatinine Trend:  SCr 2.83 [04-12 @ 08:31]  SCr 2.79 [04-11 @ 19:12]  SCr 2.52 [04-11 @ 09:50]  SCr 2.97 [04-08 @ 07:13]  SCr 3.04 [04-07 @ 07:26]    Urinalysis - [04-07-20 @ 23:10]      Color Yellow / Appearance Clear / SG 1.019 / pH 6.0      Gluc 100 mg/dL / Ketone Negative  / Bili Negative / Urobili <2 mg/dL       Blood Small / Protein 30 mg/dL / Leuk Est Negative / Nitrite Negative      RBC 4 / WBC 5 / Hyaline 0 / Gran  / Sq Epi  / Non Sq Epi 1 / Bacteria Negative    Urine Creatinine 67      [04-07-20 @ 20:15]  Urine Protein 52      [04-07-20 @ 20:15]  Urine Sodium <35      [04-07-20 @ 20:15]    Iron 28, TIBC 129, %sat 22      [10-16-19 @ 09:48]  Ferritin 7776      [04-07-20 @ 08:49]  HbA1c 5.6      [04-06-20 @ 09:54]  TSH 2.08      [12-17-19 @ 10:20]

## 2020-04-13 LAB
ALBUMIN SERPL ELPH-MCNC: 2.1 G/DL — LOW (ref 3.3–5)
ALP SERPL-CCNC: 182 U/L — HIGH (ref 40–120)
ALT FLD-CCNC: 12 U/L — SIGNIFICANT CHANGE UP (ref 10–45)
ANION GAP SERPL CALC-SCNC: 13 MMOL/L — SIGNIFICANT CHANGE UP (ref 5–17)
AST SERPL-CCNC: 20 U/L — SIGNIFICANT CHANGE UP (ref 10–40)
BILIRUB SERPL-MCNC: 0.5 MG/DL — SIGNIFICANT CHANGE UP (ref 0.2–1.2)
BUN SERPL-MCNC: 104 MG/DL — HIGH (ref 7–23)
CALCIUM SERPL-MCNC: 8.9 MG/DL — SIGNIFICANT CHANGE UP (ref 8.4–10.5)
CHLORIDE SERPL-SCNC: 125 MMOL/L — HIGH (ref 96–108)
CO2 SERPL-SCNC: 23 MMOL/L — SIGNIFICANT CHANGE UP (ref 22–31)
CREAT SERPL-MCNC: 3.46 MG/DL — HIGH (ref 0.5–1.3)
CRP SERPL-MCNC: 11.06 MG/DL — HIGH (ref 0–0.4)
FERRITIN SERPL-MCNC: 2235 NG/ML — HIGH (ref 30–400)
GLUCOSE BLDC GLUCOMTR-MCNC: 201 MG/DL — HIGH (ref 70–99)
GLUCOSE SERPL-MCNC: 228 MG/DL — HIGH (ref 70–99)
POTASSIUM SERPL-MCNC: 5.5 MMOL/L — HIGH (ref 3.5–5.3)
POTASSIUM SERPL-SCNC: 5.5 MMOL/L — HIGH (ref 3.5–5.3)
PROT SERPL-MCNC: 6 G/DL — SIGNIFICANT CHANGE UP (ref 6–8.3)
SODIUM SERPL-SCNC: 161 MMOL/L — CRITICAL HIGH (ref 135–145)

## 2020-04-13 PROCEDURE — 84300 ASSAY OF URINE SODIUM: CPT

## 2020-04-13 PROCEDURE — 82435 ASSAY OF BLOOD CHLORIDE: CPT

## 2020-04-13 PROCEDURE — 86140 C-REACTIVE PROTEIN: CPT

## 2020-04-13 PROCEDURE — 84145 PROCALCITONIN (PCT): CPT

## 2020-04-13 PROCEDURE — 84156 ASSAY OF PROTEIN URINE: CPT

## 2020-04-13 PROCEDURE — 82330 ASSAY OF CALCIUM: CPT

## 2020-04-13 PROCEDURE — 82570 ASSAY OF URINE CREATININE: CPT

## 2020-04-13 PROCEDURE — 85027 COMPLETE CBC AUTOMATED: CPT

## 2020-04-13 PROCEDURE — 85652 RBC SED RATE AUTOMATED: CPT

## 2020-04-13 PROCEDURE — 82803 BLOOD GASES ANY COMBINATION: CPT

## 2020-04-13 PROCEDURE — 83036 HEMOGLOBIN GLYCOSYLATED A1C: CPT

## 2020-04-13 PROCEDURE — 80053 COMPREHEN METABOLIC PANEL: CPT

## 2020-04-13 PROCEDURE — 84295 ASSAY OF SERUM SODIUM: CPT

## 2020-04-13 PROCEDURE — 87040 BLOOD CULTURE FOR BACTERIA: CPT

## 2020-04-13 PROCEDURE — 83605 ASSAY OF LACTIC ACID: CPT

## 2020-04-13 PROCEDURE — 85610 PROTHROMBIN TIME: CPT

## 2020-04-13 PROCEDURE — 82728 ASSAY OF FERRITIN: CPT

## 2020-04-13 PROCEDURE — 71045 X-RAY EXAM CHEST 1 VIEW: CPT

## 2020-04-13 PROCEDURE — 82947 ASSAY GLUCOSE BLOOD QUANT: CPT

## 2020-04-13 PROCEDURE — 83935 ASSAY OF URINE OSMOLALITY: CPT

## 2020-04-13 PROCEDURE — 81001 URINALYSIS AUTO W/SCOPE: CPT

## 2020-04-13 PROCEDURE — 85730 THROMBOPLASTIN TIME PARTIAL: CPT

## 2020-04-13 PROCEDURE — 76770 US EXAM ABDO BACK WALL COMP: CPT

## 2020-04-13 PROCEDURE — 83735 ASSAY OF MAGNESIUM: CPT

## 2020-04-13 PROCEDURE — 80048 BASIC METABOLIC PNL TOTAL CA: CPT

## 2020-04-13 PROCEDURE — 84132 ASSAY OF SERUM POTASSIUM: CPT

## 2020-04-13 PROCEDURE — 87635 SARS-COV-2 COVID-19 AMP PRB: CPT

## 2020-04-13 PROCEDURE — 85385 FIBRINOGEN ANTIGEN: CPT

## 2020-04-13 PROCEDURE — 82962 GLUCOSE BLOOD TEST: CPT

## 2020-04-13 PROCEDURE — 93005 ELECTROCARDIOGRAM TRACING: CPT

## 2020-04-13 PROCEDURE — 84100 ASSAY OF PHOSPHORUS: CPT

## 2020-04-13 PROCEDURE — 84484 ASSAY OF TROPONIN QUANT: CPT

## 2020-04-13 PROCEDURE — 87086 URINE CULTURE/COLONY COUNT: CPT

## 2020-04-13 PROCEDURE — 82565 ASSAY OF CREATININE: CPT

## 2020-04-13 PROCEDURE — 85379 FIBRIN DEGRADATION QUANT: CPT

## 2020-04-13 PROCEDURE — 82550 ASSAY OF CK (CPK): CPT

## 2020-04-13 PROCEDURE — 85014 HEMATOCRIT: CPT

## 2020-04-13 PROCEDURE — 99285 EMERGENCY DEPT VISIT HI MDM: CPT | Mod: 25

## 2020-04-13 RX ADMIN — Medication 110 MILLIGRAM(S): at 00:00

## 2020-04-13 NOTE — CHART NOTE - NSCHARTNOTEFT_GEN_A_CORE
4/12 PM labs resulted with glucose 716, sodium 141.   Potentially drawn too close to PIV.   Repeat FS was 201 and repeat labs drawn, pending results.

## 2020-04-13 NOTE — DISCHARGE NOTE FOR THE EXPIRED PATIENT - HOSPITAL COURSE
87 male with sepsis from pneumonia, positive COVID along with DIONTE/dehydration/ATN, acute metabolic encephalopathy in setting of acute hypoxic respiratory failure. Pt with progressive respiratory failure,  20 at 6:20AM.

## 2020-04-13 NOTE — CHART NOTE - NSCHARTNOTEFT_GEN_A_CORE
At 5:30, pt was noted to have shallow respirations, no BP or O2 sat obtainable.   Spoke with pt's son Kalpesh and notified of impending passing.  to speak with family as requested. Visitation/Facetime offered.

## 2020-04-17 LAB
CULTURE RESULTS: SIGNIFICANT CHANGE UP
CULTURE RESULTS: SIGNIFICANT CHANGE UP
SPECIMEN SOURCE: SIGNIFICANT CHANGE UP
SPECIMEN SOURCE: SIGNIFICANT CHANGE UP

## 2020-08-12 NOTE — DIETITIAN INITIAL EVALUATION ADULT. - NS FNS REASON FOR WEIGHT CHANG
decreased po intake/son reports 40pounds weight loss since 9/19 due to H-Pylori and thrush PTA/other (specify)
electronic

## 2020-09-24 NOTE — PROGRESS NOTE ADULT - PROBLEM SELECTOR PLAN 5
monitor, likely pre-renal, but will monitor creatinine.  -avoid nephrotoxic agents. no chest pain, no cough, and no shortness of breath.

## 2021-08-09 NOTE — PROGRESS NOTE ADULT - PROBLEM SELECTOR PLAN 2
Poor Poor sp MBS, aspiration with thin liquids  dysphagia 3 diet with thicken liquds Poor Poor Poor Fair Poor Fair Poor Poor Poor Poor Poor Poor Poor Poor Poor

## 2022-02-21 NOTE — PATIENT PROFILE ADULT - LEGAL HELP
Problem: Fluid Volume Excess, Risk for  Goal: # Absence of Rapid Weight Gain (no more than 2kg in 24 hours)  Description: FVE Risk Patients may gain weight (but not more than 2 kg) but may not require aggressive treatment if in the absence of dyspnea; FVE (actual) patients should be monitored to achieve no weight gain.   Outcome: Outcome Not Met, Continue to Monitor  Goal: # Absence of New Onset Dyspnea  Description: Dyspnea greater than SOB with Activity may be indicator of fluid volume excess  Outcome: Outcome Not Met, Continue to Monitor  Goal: # Verbalizes understanding of FVE prevention plan  Description: Document on Patient Education Activity  Outcome: Outcome Not Met, Continue to Monitor     Problem: Fluid Volume Excess  Goal: # Fluid Volume Excess Symptoms Resolved  Description: Treatment often consists of oxygen and respiratory support with diuretic therapy at doses that exceed usual dose (typically doubled).  Monitor patient response to treatment.    Acute dyspnea should resolve quickly if dose is adequate and kidney function is adequate. Dyspnea/SOB should only be observed with Activity after effective treatment. Patient should be able to lie down comfortably, without SOB.  Outcome: Outcome Not Met, Continue to Monitor  Goal: # Oxygenation is maintained (SpO2 greater than or equal to 90% or as ordered)  Outcome: Outcome Not Met, Continue to Monitor  Goal: # Verbalizes understanding of FVE management plan  Description: Document on Patient Education Activity  Outcome: Outcome Not Met, Continue to Monitor     Problem: At Risk for Falls  Goal: # Patient does not fall  Outcome: Outcome Not Met, Continue to Monitor  Goal: # Takes action to control fall-related risks  Outcome: Outcome Not Met, Continue to Monitor  Goal: # Verbalizes understanding of fall risk/precautions  Description: Document education using the patient education activity  Outcome: Outcome Not Met, Continue to Monitor     Problem: At Risk for  Injury Due to Fall  Goal: # Patient does not fall  Outcome: Outcome Not Met, Continue to Monitor  Goal: # Takes action to control condition specific risks  Outcome: Outcome Not Met, Continue to Monitor  Goal: # Verbalizes understanding of fall-related injury personal risks  Description: Document education using the patient education activity  Outcome: Outcome Not Met, Continue to Monitor      no

## 2022-06-09 NOTE — PROGRESS NOTE ADULT - PROBLEM/PLAN-4
DISPLAY PLAN FREE TEXT
- - -

## 2022-07-19 PROBLEM — H40.003 GLAUCOMA SUSPECT OF BOTH EYES: Status: ACTIVE | Noted: 2018-12-05

## 2023-06-15 NOTE — PATIENT PROFILE ADULT - DISASTER - NSPRESCRALCSIXMORE_GEN_A_NUR
pt non verbal, unable to obtain information Duration Of Freeze Thaw-Cycle (Seconds): 5 Post-Care Instructions: I reviewed with the patient in detail post-care instructions. Patient is to wear sunprotection, and avoid picking at any of the treated lesions. Pt may apply Vaseline to crusted or scabbing areas. Render Note In Bullet Format When Appropriate: No Show Aperture Variable?: Yes Consent: The patient's consent was obtained including but not limited to risks of crusting, scabbing, blistering, scarring, darker or lighter pigmentary change, recurrence, incomplete removal and infection. Detail Level: Detailed

## 2023-08-03 NOTE — PHYSICAL THERAPY INITIAL EVALUATION ADULT - STANDING BALANCE: STATIC
From: Dorinda Burnett  To: Emi Santos  Sent: 8/3/2023 11:04 AM CDT  Subject: Refill     I am needing a refill on my Eliquis. We have to use this new mail order pharmacy or insurance will not cover it. They said that they need a fax from your office stating I need a refill bc there are none left. The fax # is [phone] . The company name is [facility].   Please let me know if I need to do anything more. ThanksRitu   fair balance

## 2025-01-17 NOTE — PHYSICAL THERAPY INITIAL EVALUATION ADULT - THERAPY FREQUENCY, PT EVAL
Continued Stay SW/CM Assessment/Plan of Care Note       Progress note:    Pt now wants to discharge to subacute rehab ([facility]). Pt agreeable with referrals being made near [facility]. [facility] referrals have been sent out. Awaiting outcome.     See SW/CM flowsheets for other objective data.    Disposition Recommendations:  Preliminary discharge destination: Planned Discharge Destination: Home with services/support  SW/CM recommendation for discharge: Sub-acute nursing home    Destination Pharmacy:        Discharge Plan/Needs:     Continued Care and Services - Admitted Since 1/4/2025       Destination        Service Provider Request Status Selected Services Address Phone Fax    [facility] Pending - Request Sent -- [address] [facility] [id number] [phone]    [facility] Pending - Request Sent -- [address] Pending sale to [facility] [id number] [phone]    [facility] Pending - Request Sent -- [address] [facility] [address] [phone]    [facility] Pending - Request Sent -- [address] [facility] [address] [phone]    [facility]ending - Request Sent [facility] [address] [facility] [phone]    [facility] Pending - Request Sent [facility] [address] [facility] [id number] [phone]    [facility] [facility] Pending - Request Sent -- [address] [facility] [address] [phone]    [facility] Declined -- [address] [address] [phone]                  Continued Care and Services - Linked Episodes Includes continued care and service providers from the active episodes linked to this encounter, which are listed below      Care Transitions  Episode start date: 1/14/2025   There are no active outsourced providers for this episode.                   Prior To Hospitalization:    Living Situation: Family members and residing at House    .  Support Systems: None   Home Devices/Equipment: None, Blood glucose monitor            Mobility Assist Devices: None   Type of Service Prior to Hospitalization: None               Patient/Family discharge goal (s):  Home, Home Care     Resources provided:           Prior Function  Bed Mobility: Independent (01/05/25 1300 : Chris Jefferson, OT)  Transfers: Independent (01/06/25 0808 : Oxana Mcgill, PT)  Ambulation in the Home: Independent (01/06/25 0808 : Oxana Mcgill, PT)  Ambulation in the Community: Modified Independent (01/06/25 0808 : Oxana Mcgill, PT)    Current Function  Last Filed Values         Value Time User    Current Function  slightly below baseline level of function 1/16/2025 11:48 AM Oxana Mcgill, PT    Therapy Impairments  activity tolerance; pain 1/16/2025 11:48 AM Oxana Mcgill, PT    ADLs Requiring Support  ambulation; stairs 1/16/2025 11:48 AM Oxana Mcgill, PT            Therapy Recommendations for Discharge:   PT:      Last Filed Values         Value Time User    PT Discharge Needs  therapy 1-3 times per week 1/16/2025 11:48 AM Oxana Mcgill, PT          OT:       Last Filed Values         Value Time User    OT Discharge Needs  therapy 1-3 times per week  with assist from family 1/15/2025  4:49 PM Mikaela Membreno, OT          SLP:    Last Filed Values       None            Mobility Equipment Recommended for Discharge: none, owns ww      Barriers to Discharge  Identified Barriers to Discharge/Transition Planning: Medical necessity for acute care                   1-2x/week

## 2025-04-22 NOTE — ED PROVIDER NOTE - ATTENDING CONTRIBUTION TO CARE
alert and awake/follows commands Kendell:  I have independently evaluated the patient and have documented in the appropriate sections above.  I agree with the exam and plan as noted above.